# Patient Record
Sex: FEMALE | Race: WHITE | NOT HISPANIC OR LATINO | Employment: OTHER | URBAN - METROPOLITAN AREA
[De-identification: names, ages, dates, MRNs, and addresses within clinical notes are randomized per-mention and may not be internally consistent; named-entity substitution may affect disease eponyms.]

---

## 2017-03-06 ENCOUNTER — ALLSCRIPTS OFFICE VISIT (OUTPATIENT)
Dept: OTHER | Facility: OTHER | Age: 67
End: 2017-03-06

## 2017-03-31 DIAGNOSIS — M67.432 GANGLION OF LEFT WRIST: ICD-10-CM

## 2017-05-18 ENCOUNTER — TRANSCRIBE ORDERS (OUTPATIENT)
Dept: ADMINISTRATIVE | Facility: HOSPITAL | Age: 67
End: 2017-05-18

## 2017-05-18 DIAGNOSIS — Z12.31 VISIT FOR SCREENING MAMMOGRAM: Primary | ICD-10-CM

## 2017-06-01 ENCOUNTER — HOSPITAL ENCOUNTER (OUTPATIENT)
Dept: RADIOLOGY | Facility: HOSPITAL | Age: 67
Discharge: HOME | End: 2017-06-01
Payer: MEDICARE

## 2017-06-01 DIAGNOSIS — Z12.31 VISIT FOR SCREENING MAMMOGRAM: ICD-10-CM

## 2017-06-01 PROCEDURE — G0202 SCR MAMMO BI INCL CAD: HCPCS

## 2018-01-13 VITALS
HEIGHT: 65 IN | OXYGEN SATURATION: 99 % | WEIGHT: 227 LBS | DIASTOLIC BLOOD PRESSURE: 76 MMHG | TEMPERATURE: 97.9 F | BODY MASS INDEX: 37.82 KG/M2 | SYSTOLIC BLOOD PRESSURE: 118 MMHG | HEART RATE: 77 BPM

## 2018-07-30 ENCOUNTER — TRANSCRIBE ORDERS (OUTPATIENT)
Dept: ADMINISTRATIVE | Facility: HOSPITAL | Age: 68
End: 2018-07-30

## 2018-07-30 DIAGNOSIS — M89.9 BONE DISEASE: ICD-10-CM

## 2018-07-30 DIAGNOSIS — Z12.39 SCREENING BREAST EXAMINATION: Primary | ICD-10-CM

## 2018-08-14 ENCOUNTER — HOSPITAL ENCOUNTER (OUTPATIENT)
Dept: RADIOLOGY | Facility: HOSPITAL | Age: 68
Discharge: HOME | End: 2018-08-14
Payer: MEDICARE

## 2018-08-14 DIAGNOSIS — M89.9 BONE DISEASE: ICD-10-CM

## 2018-08-14 DIAGNOSIS — Z12.39 SCREENING BREAST EXAMINATION: ICD-10-CM

## 2018-08-14 PROCEDURE — 77067 SCR MAMMO BI INCL CAD: CPT

## 2018-08-14 PROCEDURE — 77080 DXA BONE DENSITY AXIAL: CPT

## 2018-08-14 PROCEDURE — 77063 BREAST TOMOSYNTHESIS BI: CPT

## 2018-08-20 ENCOUNTER — TRANSCRIBE ORDERS (OUTPATIENT)
Dept: ADMINISTRATIVE | Facility: HOSPITAL | Age: 68
End: 2018-08-20

## 2018-08-20 DIAGNOSIS — M71.21 SYNOVIAL CYST OF RIGHT POPLITEAL SPACE: Primary | ICD-10-CM

## 2018-08-22 ENCOUNTER — HOSPITAL ENCOUNTER (OUTPATIENT)
Dept: RADIOLOGY | Facility: HOSPITAL | Age: 68
Discharge: HOME | End: 2018-08-22
Payer: MEDICARE

## 2018-08-22 DIAGNOSIS — R92.8 ABNORMAL MAMMOGRAM: ICD-10-CM

## 2018-08-22 PROCEDURE — 77065 DX MAMMO INCL CAD UNI: CPT

## 2018-08-22 PROCEDURE — 76642 ULTRASOUND BREAST LIMITED: CPT

## 2018-08-23 ENCOUNTER — HOSPITAL ENCOUNTER (OUTPATIENT)
Dept: RADIOLOGY | Facility: HOSPITAL | Age: 68
Discharge: HOME | End: 2018-08-23
Payer: MEDICARE

## 2018-08-23 DIAGNOSIS — M71.21 SYNOVIAL CYST OF RIGHT POPLITEAL SPACE: ICD-10-CM

## 2018-08-23 PROCEDURE — 76882 US LMTD JT/FCL EVL NVASC XTR: CPT

## 2018-08-30 ENCOUNTER — OFFICE VISIT (OUTPATIENT)
Dept: SURGERY | Facility: CLINIC | Age: 68
End: 2018-08-30
Payer: MEDICARE

## 2018-08-30 VITALS
WEIGHT: 228 LBS | DIASTOLIC BLOOD PRESSURE: 80 MMHG | TEMPERATURE: 99.5 F | HEART RATE: 93 BPM | HEIGHT: 63 IN | SYSTOLIC BLOOD PRESSURE: 132 MMHG | BODY MASS INDEX: 40.4 KG/M2

## 2018-08-30 DIAGNOSIS — M71.21 BAKER'S CYST, RIGHT: Primary | ICD-10-CM

## 2018-08-30 DIAGNOSIS — M71.22 BAKER'S CYST, LEFT: ICD-10-CM

## 2018-08-30 PROCEDURE — 99215 OFFICE O/P EST HI 40 MIN: CPT | Performed by: SPECIALIST

## 2018-08-30 RX ORDER — MULTIVIT-MIN/IRON FUM/FOLIC AC 7.5 MG-4
1 TABLET ORAL DAILY
COMMUNITY

## 2018-08-30 RX ORDER — PERPHENAZINE/AMITRIPTYLINE HCL 4 MG-25 MG
TABLET ORAL
COMMUNITY

## 2018-08-30 NOTE — PROGRESS NOTES
History and Physical    Linda Faust 77 y o  female MRN: [id number]  Unit/Bed#:  Encounter: [id number]    History of Present Illness     HPI:  Linda Faust is a 77 y o  female who presents with a Baker cyst of her right knee and a popliteal cyst on her left  She thought she pulled a muscle  And then it got worse  An ultrasound was performed which revealed a right Baker's cyst   With an inferior component 1 cm in diameter extending superior from this is a component which measures 0 4 x 4 cm  She has a left popliteal cyst which is 5 5 x 0 5 cm with another component which is 1 x 1 7 cm  She has seen Dr Reji Kraus in the past for any problems  At which time she was told she might need a knee replacement  Review of Systems   Cardiovascular:        Possible high cholesterol  Awaiting blood test   All other systems reviewed and are negative  Historical Information   No past medical history on file  Past Surgical History:   Procedure Laterality Date    BREAST LUMPECTOMY  1970   [facility]    COLONOSCOPY  2016     Social History   History   Alcohol Use No     History   Drug Use No     History   Smoking Status    Never Smoker   Smokeless Tobacco    Never Used     Family History:   Family History   Problem Relation Age of Onset    Heart disease Father        Meds/Allergies   all current active meds have been reviewed, current meds: No current facility-administered medications for this visit       and PTA meds:    (Not in a hospital admission)  No Known Allergies    Objective       Current Vitals:   Blood Pressure: 132/80 (08/30/18 1500)  Pulse: 93 (08/30/18 1500)  Temperature: 99 5 °F (37 5 °C) (08/30/18 1500)  Temp Source: Oral (08/30/18 1500)  Height: 5' 3" (160 cm) (08/30/18 1500)  Weight - Scale: 103 kg (228 lb) (08/30/18 1500)    Invasive Devices          No matching active lines, drains, or airways          Physical Exam   Constitutional: She is oriented to person, place, and time  She appears well-developed and well-nourished  No distress  HENT:   Head: Normocephalic and atraumatic  Right Ear: External ear normal    Left Ear: External ear normal    Nose: Nose normal    Mouth/Throat: Oropharynx is clear and moist  No oropharyngeal exudate  Eyes: Conjunctivae and EOM are normal  Pupils are equal, round, and reactive to light  Right eye exhibits no discharge  Left eye exhibits no discharge  No scleral icterus  Neck: Normal range of motion  Neck supple  No JVD present  No tracheal deviation present  No thyromegaly present  Cardiovascular: Normal rate, regular rhythm and intact distal pulses  Exam reveals no gallop and no friction rub  No murmur heard  Pulmonary/Chest: Effort normal and breath sounds normal  No stridor  No respiratory distress  She has no wheezes  She has no rales  She exhibits no tenderness  Abdominal: Soft  Bowel sounds are normal  She exhibits no distension and no mass  There is no tenderness  There is no rebound and no guarding  Musculoskeletal: Normal range of motion  She exhibits no edema, tenderness or deformity  4 cm cyst  posterior right knee    4 cm cyst below left knee   Lymphadenopathy:     She has no cervical adenopathy  Neurological: She is alert and oriented to person, place, and time  She has normal reflexes  No cranial nerve deficit  She exhibits normal muscle tone  Coordination normal    Skin: Skin is warm  No rash noted  She is not diaphoretic  No erythema  No pallor  Psychiatric: She has a normal mood and affect  Her behavior is normal  Judgment and thought content normal        Lab Results: I have personally reviewed pertinent lab results    , CBC: No results found for: WBC, HGB, HCT, MCV, PLT, ADJUSTEDWBC, MCH, MCHC, RDW, MPV, NRBC, CMP: Lab Results   Component Value Date    BUN 21 07/13/2016    CREATININE 0 96 07/13/2016    EGFR 57 8 07/13/2016   , Coagulation: No results found for: PT, INR, APTT, Urinalysis: No results found for: [place], SPECGRAV, PHUR, LEUKOCYTESUR, NITRITE, PROTEINUA, GLUCOSEU, KETONESU, BILIRUBINUR, BLOODU, Amylase: No results found for: AMYLASE, Lipase: No results found for: LIPASE  Imaging: Dxa Bone Density Spine Hip And Pelvis    Result Date: 8/14/2018  Narrative: CENTRAL  DXA SCAN CLINICAL HISTORY:   77year old post-menopausal  female without central risk factors  Osteoporosis screening TECHNIQUE: Bone densitometry was performed using a authorGEN bone densitometer  Regions of interest appear properly placed  L3 and L4 were excluded due to sclerosis  There are no obvious fractures or other confounding variables which could limit the study  COMPARISON:  None  RESULTS: LUMBAR SPINE:  L1-L2: BMD 0 914 gm/cm2 T-score -2 1 Z-score -0 5 LEFT TOTAL HIP: BMD 0 808 gm/cm2 T-score -1 6 Z-score -0 1 LEFT FEMORAL NECK: BMD 0 688 gm/cm2 T-score -2 5 Z-score -0 8 RIGHT TOTAL HIP: BMD 0 849 gm/cm2 T-score -1 3 Z-score 0 2 RIGHT FEMORAL NECK: BMD 0 703 gm/cm2 T-score -2 4 Z-score -0 7     Impression: 1  Based on the [facility] classification, the T-score of -2 5 in the left femoral neck is consistent with osteoporosis  2   According to the [address] [place] [address], prescription therapy is recommended with a T-score of -2 5 or less in the spine or hip after appropriate evaluation to exclude secondary causes  3   A daily intake of at least 1200 mg Calcium and 800 to 1000 IU of Vitamin D, as well as weight bearing and muscle strengthening exercise, fall prevention and avoidance of tobacco and excessive alcohol intake as  basic preventive measures are suggested  4   Repeat DXA  in 18 - 24 months, on the same machine, as clinically indicated   WHO CLASSIFICATION: Normal (a T-score of -1 0 or higher) Low bone mineral density (a T-score of less than -1 0 but higher than -2 5) Osteoporosis (a T-score of -2 5 or less) Severe osteoporosis (a T-score of -2 5 or less with a fragility fracture)   Workstation performed: [id number]     Mammo Diagnostic Right W Cad    Result Date: 8/22/2018  Narrative: Patient History: Patient is postmenopausal  Family history of breast cancer at age 48 or over in maternal aunt  Benign excisional biopsy of the right breast, 1971  Patient's BMI is 34 9  Reason for exam: addl evaluation requested from abnormal screening  Follow-up nodule seen in the right breast on a screening study Mammo Diagnostic Right W CAD: August 22, 2018 - Check In #: [de-identified] Spot compression CC, spot compression MLO, and ML view(s) were taken of the right breast  Technologist: Maxine Rubinstein, R T RM Prior study comparison: August 14, 2018, mammo screening bilateral W DBT and CAD performed at [place] [address]  June 1, 2017, mammo screening bilateral W CAD performed at [place] [address]  December 11, 2015, [facility] screening bilateral digital matilda performed at [place] [address]  February 29, 2012, bilateral screening mammogram performed at [place] [address]  August 30, 2010, bilateral screening mammogram performed at [place] [address]  August 28, 2009, bilateral screening mammogram performed at [place] [address]  There are scattered fibroglandular densities  A 5 mm well-circumscribed nodule persists in the upper outer right breast   There is an ultrasound correlate for this  US Breast Right Limited: August 22, 2018 - Check In #: [de-identified] Standard views  Technologist: Cindy Durant RDMS A uniformly echogenic layer of fibroglandular tissue  Real-time images are obtained in the right breast   At the 11 o'clock position, 10 cm from the nipple a well-circumscribed oval-shaped hypoechoic nodule measures 0 9 x 0 3 x 1 0 cm  This could represent a small lymph node or perhaps a complicated cyst   This does not have a mammographic correlate   At the 11:30 position, 10 cm from the nipple is a simple cyst measuring 0 6 x 0 3 x 0 5 cm  This correlates with the mammographic finding  IMPRESSION Simple cyst at the 11:30 position which correlates with the mammographic finding  Complex cyst or lymph node at the 11 o'clock position  ACR BI-RADS® Assessments: BiRad:3 - Probably Benign (Overall) Right breast Right Diag Mamm: BiRad:2 - benign finding in the right breast  Right breast Right Brst US: BiRad:3 - probably benign finding in the right breast  Recommendation: Ultrasound of the right breast in 6 months  Analyzed by CAD The patient is scheduled in a reminder system for screening mammography  Transcription Location: [address] [place] [address] [place]: [id number] Risk Value(s): Tyrer-Cuzick 10 Year: 5 800%, Tyrer-Cuzick Lifetime: 9 200%, Myriad Table: 1 5%, GHAZALA 5 Year: 2 4%, NCI Lifetime: 7 1%    Us Breast Right Limited (diagnostic)    Result Date: 8/22/2018  Narrative: Patient History: Patient is postmenopausal  Family history of breast cancer at age 48 or over in maternal aunt  Benign excisional biopsy of the right breast, 1971  Patient's BMI is 34 9  Reason for exam: addl evaluation requested from abnormal screening  Follow-up nodule seen in the right breast on a screening study Mammo Diagnostic Right W CAD: August 22, 2018 - Check In #: [de-identified] Spot compression CC, spot compression MLO, and ML view(s) were taken of the right breast  Technologist: ANALY Soni Prior study comparison: August 14, 2018, mammo screening bilateral W DBT and CAD performed at [place] [address]  June 1, 2017, mammo screening bilateral W CAD performed at [place] [address]  December 11, 2015, [facility] screening bilateral digital matilda performed at [place] [address]  February 29, 2012, bilateral screening mammogram performed at [place] [address]  August 30, 2010, bilateral screening mammogram performed at [place] [address]    August 28, 2009, bilateral screening mammogram performed at [place] [address]  There are scattered fibroglandular densities  A 5 mm well-circumscribed nodule persists in the upper outer right breast   There is an ultrasound correlate for this  US Breast Right Limited: August 22, 2018 - Check In #: [de-identified] Standard views  Technologist: Kaity Hutchison RDMS A uniformly echogenic layer of fibroglandular tissue  Real-time images are obtained in the right breast   At the 11 o'clock position, 10 cm from the nipple a well-circumscribed oval-shaped hypoechoic nodule measures 0 9 x 0 3 x 1 0 cm  This could represent a small lymph node or perhaps a complicated cyst   This does not have a mammographic correlate  At the 11:30 position, 10 cm from the nipple is a simple cyst measuring 0 6 x 0 3 x 0 5 cm  This correlates with the mammographic finding  IMPRESSION Simple cyst at the 11:30 position which correlates with the mammographic finding  Complex cyst or lymph node at the 11 o'clock position  ACR BI-RADS® Assessments: BiRad:3 - Probably Benign (Overall) Right breast Right Diag Mamm: BiRad:2 - benign finding in the right breast  Right breast Right Brst US: BiRad:3 - probably benign finding in the right breast  Recommendation: Ultrasound of the right breast in 6 months  Analyzed by CAD The patient is scheduled in a reminder system for screening mammography  Transcription Location: [address] [place] [address]: [id number] Risk Value(s): Edwiner-Cuzick 10 Year: 5 800%, Tyrer-Cuzick Lifetime: 9 200%, Myriad Table: 1 5%, GHAZALA 5 Year: 2 4%, NCI Lifetime: 7 1%    Us Extremity Soft Tissue    Result Date: 8/23/2018  Narrative: LOWER EXTREMITY ULTRASOUND INDICATION:  Leg swelling  COMPARISON:  None  FINDINGS: Ultrasound evaluation of the right popliteal fossa was performed to evaluate for possible Baker's cysts  There is evidence of a right Baker's cyst   An inferior component measures approximately 1 cm in diameter    Extending superior from this is a component which measures approximately 0 4 x 4 0 cm  A left popliteal cyst is present  There is a superior component that measures approximately 1 0 x 1 7 cm  Extending inferior from this is a component that measures approximately 5 5 x 0 5 cm  Impression: Bilateral Baker's cysts  Workstation performed: [id number]     Mammo Screening Bilateral W 3d & Cad    Result Date: 8/15/2018  Narrative: Patient History: Patient is postmenopausal  Family history of breast cancer at age 48 or over in maternal aunt  Benign excisional biopsy of the right breast, 1971  Patient's BMI is 34 9  Reason for exam: screening, asymptomatic  Screening Mammo Screening Bilateral W DBT and CAD: August 14, 2018 - Check In #: [de-identified] 2D/3D Procedure 3D Bilateral CC and MLO view(s) were taken  2D Bilateral CC and MLO view(s) were taken  Technologist: PIYUSH Quach Prior study comparison: June 1, 2017, mammo screening bilateral W CAD performed at [place] [address]  December 11, 2015, [facility] screening bilateral digital matilda performed at [place] [address]  February 29, 2012, bilateral screening mammogram performed at [place] [address]  August 30, 2010, bilateral screening mammogram performed at [place] [address]  August 28, 2009, bilateral screening mammogram performed at [place] [address]  There are scattered fibroglandular densities  Right breast upper-outer quadrant nodule should have further evaluation diagnostic mammography and ultrasound  No dominant soft tissue mass, architectural distortion or suspicious calcifications are noted in either breast   The skin and nipple contours are within normal limits   IMPRESSION ACR BI-RADS® Assessments: BiRad:0 - Incomplete: needs additional imaging evaluation Recommendation: Further imaging of the right breast  A breast health care nurse from our facility will be contacting the patient regarding the need for additional imaging  The patient is scheduled in a reminder system for screening mammography  8-10% of cancers will be missed on mammography  Management of a palpable abnormality must be based on clinical grounds  Patients will be notified of their results via letter from our facility  Accredited by [address] [place] [address] of Radiology and FDA  Transcription Location: [address] [place] [address] [place]: [id number] Risk Value(s): Tyrer-Cuzick 10 Year: 5 800%, Tyrer-Cuzick Lifetime: 9 200%, Myriad Table: 1 5%, GHAZALA 5 Year: 2 4%, NCI Lifetime: 7 1%    EKG, Pathology, and Other Studies: I have personally reviewed pertinent reports  Assessment/Plan     Assessment:  Héctor Armenta comes today for Baker cysts of her knee  She has been seen by Dr schilling and a past for possible knee replacement  In view of this I recommend her going back to her orthopedist to evaluate the cyst behind her right and left knee  Plan:  Referral DR Leandro Goyal who she has seen in the past        Counseling / Coordination of Care  Total face to face office time spent today 40minutes  Greater than 50% of total time was spent with the patient and / or family counseling and / or coordination of care  A description of the counseling / coordination of care- see assessement

## 2019-04-01 ENCOUNTER — TRANSCRIBE ORDERS (OUTPATIENT)
Dept: ADMINISTRATIVE | Facility: HOSPITAL | Age: 69
End: 2019-04-01

## 2019-04-01 DIAGNOSIS — N60.01 SOLITARY CYST OF RIGHT BREAST: Primary | ICD-10-CM

## 2019-04-10 ENCOUNTER — HOSPITAL ENCOUNTER (OUTPATIENT)
Dept: RADIOLOGY | Facility: HOSPITAL | Age: 69
Discharge: HOME | End: 2019-04-10
Payer: MEDICARE

## 2019-04-10 VITALS — HEIGHT: 64 IN | BODY MASS INDEX: 37.05 KG/M2 | WEIGHT: 217 LBS

## 2019-04-10 DIAGNOSIS — N60.09 SOLITARY CYST OF BREAST: ICD-10-CM

## 2019-04-10 PROCEDURE — 76642 ULTRASOUND BREAST LIMITED: CPT

## 2019-11-06 ENCOUNTER — TRANSCRIBE ORDERS (OUTPATIENT)
Dept: ADMINISTRATIVE | Facility: HOSPITAL | Age: 69
End: 2019-11-06

## 2019-11-06 DIAGNOSIS — R92.8 ABNORMAL MAMMOGRAM: Primary | ICD-10-CM

## 2019-12-04 ENCOUNTER — HOSPITAL ENCOUNTER (OUTPATIENT)
Dept: RADIOLOGY | Facility: HOSPITAL | Age: 69
Discharge: HOME | End: 2019-12-04
Payer: MEDICARE

## 2019-12-04 VITALS — WEIGHT: 215 LBS | HEIGHT: 65 IN | BODY MASS INDEX: 35.82 KG/M2

## 2019-12-04 DIAGNOSIS — R92.8 ABNORMAL MAMMOGRAM: ICD-10-CM

## 2019-12-04 PROCEDURE — 76642 ULTRASOUND BREAST LIMITED: CPT

## 2019-12-04 PROCEDURE — G0279 TOMOSYNTHESIS, MAMMO: HCPCS

## 2019-12-04 PROCEDURE — 77066 DX MAMMO INCL CAD BI: CPT

## 2020-06-24 ENCOUNTER — TRANSCRIBE ORDERS (OUTPATIENT)
Dept: ADMINISTRATIVE | Facility: HOSPITAL | Age: 70
End: 2020-06-24

## 2020-06-24 DIAGNOSIS — R92.8 ABNORMAL MAMMOGRAM: Primary | ICD-10-CM

## 2020-07-22 ENCOUNTER — HOSPITAL ENCOUNTER (OUTPATIENT)
Dept: RADIOLOGY | Facility: HOSPITAL | Age: 70
Discharge: HOME | End: 2020-07-22
Payer: MEDICARE

## 2020-07-22 DIAGNOSIS — R92.8 ABNORMAL MAMMOGRAM: ICD-10-CM

## 2020-07-22 PROCEDURE — 76642 ULTRASOUND BREAST LIMITED: CPT

## 2021-08-31 ENCOUNTER — HOSPITAL ENCOUNTER (OUTPATIENT)
Dept: RADIOLOGY | Facility: HOSPITAL | Age: 71
Discharge: HOME | End: 2021-08-31
Payer: MEDICARE

## 2021-08-31 VITALS — BODY MASS INDEX: 36.37 KG/M2 | HEIGHT: 64 IN | WEIGHT: 213 LBS

## 2021-08-31 DIAGNOSIS — Z12.31 ENCOUNTER FOR SCREENING MAMMOGRAM FOR MALIGNANT NEOPLASM OF BREAST: ICD-10-CM

## 2021-08-31 PROCEDURE — 77067 SCR MAMMO BI INCL CAD: CPT

## 2021-08-31 PROCEDURE — 77063 BREAST TOMOSYNTHESIS BI: CPT

## 2021-09-14 ENCOUNTER — HOSPITAL ENCOUNTER (OUTPATIENT)
Dept: RADIOLOGY | Facility: HOSPITAL | Age: 71
Discharge: HOME | End: 2021-09-14
Payer: MEDICARE

## 2021-09-14 DIAGNOSIS — M89.9 DISORDER OF BONE, UNSPECIFIED: ICD-10-CM

## 2021-09-14 PROCEDURE — 77080 DXA BONE DENSITY AXIAL: CPT

## 2022-01-04 ENCOUNTER — HOSPITAL ENCOUNTER (EMERGENCY)
Facility: HOSPITAL | Age: 72
Discharge: HOME | End: 2022-01-04
Attending: EMERGENCY MEDICINE | Admitting: EMERGENCY MEDICINE
Payer: MEDICARE

## 2022-01-04 VITALS
RESPIRATION RATE: 20 BRPM | WEIGHT: 213.6 LBS | TEMPERATURE: 98 F | BODY MASS INDEX: 37.24 KG/M2 | DIASTOLIC BLOOD PRESSURE: 79 MMHG | SYSTOLIC BLOOD PRESSURE: 151 MMHG | HEART RATE: 92 BPM | OXYGEN SATURATION: 97 %

## 2022-01-04 DIAGNOSIS — R55 SYNCOPE: Primary | ICD-10-CM

## 2022-01-04 DIAGNOSIS — U07.1 COVID: ICD-10-CM

## 2022-01-04 PROCEDURE — 99284 EMERGENCY DEPT VISIT MOD MDM: CPT | Performed by: EMERGENCY MEDICINE

## 2022-01-04 PROCEDURE — 87636 SARSCOV2 & INF A&B AMP PRB: CPT | Performed by: EMERGENCY MEDICINE

## 2022-01-04 PROCEDURE — 99284 EMERGENCY DEPT VISIT MOD MDM: CPT

## 2022-01-04 NOTE — DISCHARGE INSTRUCTIONS
Your vital signs are normal, you have no serious or concerning symptoms  You should rest at home and orally hydrate  Return to the ER if any head pain, chest pain, breathing distress, dizziness or other concerning symptoms  Otherwise isolate for at least 7 days and use tylenol for fever or aches  You need to be improved and no fever or chills for at least 24 hours before you come out of isolation  We will confirm the covid with a PCR test and if positive you will be referred for possible monoclonal antibody treatment

## 2022-01-09 LAB
FLUAV RNA RESP QL NAA+PROBE: NEGATIVE
FLUBV RNA RESP QL NAA+PROBE: NEGATIVE
SARS-COV-2 RNA RESP QL NAA+PROBE: POSITIVE

## 2023-10-09 ENCOUNTER — OFFICE VISIT (OUTPATIENT)
Dept: OBGYN CLINIC | Facility: CLINIC | Age: 73
End: 2023-10-09
Payer: MEDICARE

## 2023-10-09 ENCOUNTER — APPOINTMENT (OUTPATIENT)
Dept: RADIOLOGY | Facility: CLINIC | Age: 73
End: 2023-10-09
Payer: MEDICARE

## 2023-10-09 VITALS
SYSTOLIC BLOOD PRESSURE: 138 MMHG | HEART RATE: 73 BPM | BODY MASS INDEX: 38.8 KG/M2 | WEIGHT: 219 LBS | DIASTOLIC BLOOD PRESSURE: 85 MMHG | HEIGHT: 63 IN

## 2023-10-09 DIAGNOSIS — M67.813 BICEPS TENDINOSIS OF RIGHT SHOULDER: Primary | ICD-10-CM

## 2023-10-09 DIAGNOSIS — M75.81 ROTATOR CUFF TENDINITIS, RIGHT: ICD-10-CM

## 2023-10-09 DIAGNOSIS — M25.511 RIGHT SHOULDER PAIN, UNSPECIFIED CHRONICITY: ICD-10-CM

## 2023-10-09 PROCEDURE — 73030 X-RAY EXAM OF SHOULDER: CPT

## 2023-10-09 PROCEDURE — 99203 OFFICE O/P NEW LOW 30 MIN: CPT | Performed by: ORTHOPAEDIC SURGERY

## 2023-10-09 RX ORDER — PREDNISOLONE ACETATE 10 MG/ML
SUSPENSION/ DROPS OPHTHALMIC
COMMUNITY
Start: 2023-09-15

## 2023-10-10 NOTE — PROGRESS NOTES
Orthopedic Sports Medicine New Patient Visit     Assesment:   74 y.o. female with right rotator cuff tendinitis and biceps tendinitis    Plan:    The patient has atraumatic shoulder pain as well as a painless popping sensation localized the bicipital groove. Her strength rotator cuff testing is excellent. I recommended a course of conservative treatment options prior to considering any advanced imaging. I placed a referral for physical therapy to focus on rotator cuff and bicep strengthening and stretching as well as shoulder stabilization and other interventions as indicated. I will see her back in 6 to 8 weeks for repeat evaluation after physical therapy. She can do NSAIDs Tylenol ice or heat as needed for pain. May consider injections in the future as well if she would like. Follow up:    Return in about 8 weeks (around 2023). Chief Complaint   Patient presents with   • Right Shoulder - Pain       History of Present Illness: The patient is a 74 y.o. female presenting with atraumatic pain in the right shoulder. This been present for the last several months. She localizes the majority of her pain to the bicipital groove. She also gets a painless popping sensation there. This is disrupting her sleep from time to time. She does not have any significant neck pain or radiation down to the hand. Pain is worse with overhead and reaching activities. It is better with rest.  She denies any numbness or tingling.     Shoulder Surgical History:  None    Past Medical, Social and Family History:  Past Medical History:   Diagnosis Date   • Breast cyst      Past Surgical History:   Procedure Laterality Date   • BREAST CYST EXCISION Right 1971    2 rt breast cysts removed   •  SECTION      and    • CHOLECYSTECTOMY     • COLONOSCOPY       No Known Allergies  Current Outpatient Medications on File Prior to Visit   Medication Sig Dispense Refill   • Lutein 40 MG CAPS Take by mouth     • Multiple Vitamins-Minerals (MULTIVITAMIN WITH MINERALS) tablet Take 1 tablet by mouth daily     • prednisoLONE acetate (PRED FORTE) 1 % ophthalmic suspension INSTILL 1 DROP INTO LEFT EYES 4 TIMES A DAY       No current facility-administered medications on file prior to visit. Social History     Socioeconomic History   • Marital status:      Spouse name: Not on file   • Number of children: Not on file   • Years of education: Not on file   • Highest education level: Not on file   Occupational History   • Not on file   Tobacco Use   • Smoking status: Never   • Smokeless tobacco: Never   Vaping Use   • Vaping Use: Never used   Substance and Sexual Activity   • Alcohol use: No   • Drug use: No   • Sexual activity: Not on file   Other Topics Concern   • Not on file   Social History Narrative   • Not on file     Social Determinants of Health     Financial Resource Strain: Not on file   Food Insecurity: Not on file   Transportation Needs: Not on file   Physical Activity: Not on file   Stress: Not on file   Social Connections: Not on file   Intimate Partner Violence: Not on file   Housing Stability: Not on file         I have reviewed the past medical, surgical, social and family history, medications and allergies as documented in the EMR. Review of systems: ROS is negative other than that noted in the HPI. Constitutional: Negative for fatigue and fever. HENT: Negative for sore throat. Respiratory: Negative for shortness of breath. Cardiovascular: Negative for chest pain. Gastrointestinal: Negative for abdominal pain. Endocrine: Negative for cold intolerance and heat intolerance. Genitourinary: Negative for flank pain. Musculoskeletal: Negative for back pain. Skin: Negative for rash. Allergic/Immunologic: Negative for immunocompromised state. Neurological: Negative for dizziness. Psychiatric/Behavioral: Negative for agitation.       Physical Exam:    Blood pressure 138/85, pulse 73, height 5' 3" (1.6 m), weight 99.3 kg (219 lb), not currently breastfeeding. General/Constitutional: NAD, well developed, well nourished  HENT: Normocephalic, atraumatic  CV: Intact distal pulses, regular rate  Resp: No respiratory distress or labored breathing  Abdomen: soft, nondistended, non tender   Lymphatic: No lymphadenopathy palpated  Neuro: Alert and Oriented x 3, no focal deficits  Psych: Normal mood, normal affect  Skin: Warm, dry, no rashes, no erythema      Shoulder Exam:      Inspection: No ecchymosis, edema, or deformity. No visualized wounds or skin lesions   Palpation: Tender to palpation bicipital groove. No AC joint tenderness  Active Motion:       FF: 160        ER: 70        IR: L1  Strength: 5/5 empty can, 5/5 ER,  5/5 IR   Sensory - SILT in the Radial / Ulnar / Median / Axillary nerve distributions  Motor - AIN / PIN / Radial / Ulnar / Median / Axillary motor nerves in tact  Palpable Radial pulse  Cap refill <2secs in all digits    + Márquez  + Autumn's  - Belly Press    + Holguin  Imaging    I reviewed and interpreted X-rays of the shoulder which show no acute fractures. Minimal degenerative changes of the before meals and glenohumeral joint. The humeral head is well centered on the glenoid.

## 2023-10-16 ENCOUNTER — EVALUATION (OUTPATIENT)
Dept: PHYSICAL THERAPY | Facility: CLINIC | Age: 73
End: 2023-10-16
Payer: MEDICARE

## 2023-10-16 DIAGNOSIS — M75.81 ROTATOR CUFF TENDINITIS, RIGHT: ICD-10-CM

## 2023-10-16 DIAGNOSIS — M67.813 BICEPS TENDINOSIS OF RIGHT SHOULDER: ICD-10-CM

## 2023-10-16 PROCEDURE — 97110 THERAPEUTIC EXERCISES: CPT

## 2023-10-16 PROCEDURE — 97161 PT EVAL LOW COMPLEX 20 MIN: CPT

## 2023-10-16 NOTE — PROGRESS NOTES
PT Evaluation     Today's date: 10/16/2023  Patient name: Steph Fleming  : 1950  MRN: [id number]  Referring provider: Marly Haile  Dx:   Encounter Diagnosis     ICD-10-CM    1. Biceps tendinosis of right shoulder  M67.813 Ambulatory Referral to Physical Therapy      2. Rotator cuff tendinitis, right  M75.81 Ambulatory Referral to Physical Therapy                     Assessment  Assessment details: Steph Fleming is a 74 y.o. female who presents with subacute right shoulder pain consistent with the referring diagnoses of right biceps tendinitis and right rotator cuff tendinitis. Patient presents with the following impairments: right shoulder pain, limited right shoulder ER ROM, limited right shoulder. Due to these impairments, patient has difficulty performing the following: reaching behind the back, pushing, pulling, lifting, lifting overhead, and walking the dog. Patient has been educated in home exercise program and plan of care. Patient would benefit from skilled physical therapy services to address the above functional limitations and progress towards prior level of function and independence with home exercise program.  Impairments: abnormal muscle firing, abnormal or restricted ROM, activity intolerance, impaired physical strength, lacks appropriate home exercise program, pain with function, scapular dyskinesis, poor posture  and poor body mechanics  Understanding of Dx/Px/POC: good   Prognosis: good    Goals  Short Term Goals [3 weeks; target date: 23]  1. Patient will be independent with initial HEP. 2. Patient will demonstrate an increase in right shoulder ER PROM to 85°. 3. Patient will demonstrate an increase in right shoulder strength of 1/2 grade on MMT. 4. Patient will be able to complete all below shoulder-height ADLs without increase in symptoms. Long Term Goals [6 weeks; target date: 23]  1. Patient will be independent with comprehensive HEP.     2. Patient will demonstrate an increase in right shoulder AROM to WNL in order to promote bathing/dressing without pain. 3. Patient will demonstrate an increase in right shoulder strength to at least 4/5 on MMT in order to promote pain-free carrying/lifting. 4. Patient will be able to place a 5 lb. weight on a shelf above shoulder height with proper scapulohumeral mechanics. 5. Patient will be able to push a 50 lb. object across the floor with R shoulder pain of 2-3/10 at worst.     Plan  Plan details: Patient has verbalized understanding and agreement with insurance verification form. Patient would benefit from: skilled physical therapy  Planned modality interventions: cryotherapy, electrical stimulation/Russian stimulation, TENS, ultrasound, high voltage pulsed current: pain management, thermotherapy: hydrocollator packs, unattended electrical stimulation and high voltage pulsed current: spasm management  Planned therapy interventions: flexibility, functional ROM exercises, graded exercise, home exercise program, joint mobilization, manual therapy, neuromuscular re-education, patient education, strengthening, stretching, therapeutic exercise, therapeutic activities, activity modification, postural training, body mechanics training, graded activity, self care, muscle pump exercises, abdominal trunk stabilization, ADL retraining, ADL training, IADL retraining, breathing training, behavior modification, therapeutic training, transfer training and Hernandez taping  Frequency: 2x week  Duration in weeks: 6  Plan of Care beginning date: 10/16/2023  Plan of Care expiration date: 12/31/2023  Treatment plan discussed with: patient    Subjective Evaluation    History of Present Illness  Mechanism of injury: Pt reports R shoulder pain over the past 3 weeks, which she attributes to moving and pushing boxes around her home as she is trying to get rid of things around her home.  Pt states that while pushing with her arm to get up from her sectional couch, she has pain in the shoulder. Pt states that reaching back will sometimes cause popping and mild pain. Pt denies difficulty with getting dressed, bathing, or getting ready. Pt states she had an x-ray taken when she saw orthopedics, which was normal. Pt was referred to PT. Patient Goals  Patient goal: Complete healing of right shoulder  Pain  Current pain ratin  At best pain ratin  At worst pain ratin  Location: Right shoulder  Quality: sharp  Relieving factors: heat  Exacerbated by: Reaching back, pushing, pulling, lifting. Progression: no change    Social Support  Lives with: adult children    Employment status: not working  Hand dominance: left  Exercise history: Walking 1 mile a day, sometimes 2x/day      Diagnostic Tests  X-ray: normal  Treatments  No previous or current treatments      Objective     Active Range of Motion   Left Shoulder   Flexion: 160 degrees   Abduction: 170 degrees   External rotation 0°: 85 degrees   Internal rotation 0°: WFL  Internal rotation BTB: T8     Right Shoulder   Flexion: 160 degrees   Abduction: 170 degrees   External rotation 0°: 70 degrees with pain  Internal rotation 0°: WFL  Internal rotation BTB: T8     Strength/Myotome Testing     Left Shoulder     Planes of Motion   Flexion: 5   Abduction: 5   External rotation at 0°: 4+   Internal rotation at 0°: 5     Right Shoulder     Planes of Motion   Flexion: 4+   Abduction: 4   External rotation at 0°: 3+   Internal rotation at 0°: 4     Additional Strength Details  Reports slight inc. R shoulder pain with abduction and IR MMT.            Insurance:  AMA/CMS Eval/ Re-eval POC expires FOTO Auth Status Co-Insurance   CMS - [facility] 10/16/23 12/31/23  None req No                                              1.  10/16 2.  3.  4.  5.  6.    7.  8.  9.  10.  11.  12.    13.  14.  15.  16.  17.  18.    19.  20. 21. 22. 23. 24.    25.  26. 27. 28. 29. 30.   31. 32.  33. 34. 35. 36.        Precautions: Standard       EVAL 2 3 4 5 6   Manuals 10/16/23        STM/MFR          Joint mobs                  Neuro Re-Ed         Rows         Shoulder extensions         B/L ER                                    Ther Ex         Sidelying ER 5x         Cane extension 5x standing        Theraband IR/ER                                                               Ther Activity         Pt education POC, HEP                                                              Modalities                              Access Code: [email or web address]  URL: [email or web address]  Date: 10/16/2023  Prepared by: Arroyo Cage    Exercises  - Sidelying Shoulder External Rotation  - 1 x daily - 7 x weekly - 2 sets - 10 reps - 3 hold  - Standing Shoulder Extension with Dowel  - 1 x daily - 7 x weekly - 2 sets - 10 reps - 5 hold

## 2023-10-20 ENCOUNTER — OFFICE VISIT (OUTPATIENT)
Dept: PHYSICAL THERAPY | Facility: CLINIC | Age: 73
End: 2023-10-20
Payer: MEDICARE

## 2023-10-20 DIAGNOSIS — M67.813 BICEPS TENDINOSIS OF RIGHT SHOULDER: Primary | ICD-10-CM

## 2023-10-20 DIAGNOSIS — M75.81 ROTATOR CUFF TENDINITIS, RIGHT: ICD-10-CM

## 2023-10-20 PROCEDURE — 97112 NEUROMUSCULAR REEDUCATION: CPT

## 2023-10-20 PROCEDURE — 97110 THERAPEUTIC EXERCISES: CPT

## 2023-10-20 NOTE — PROGRESS NOTES
Daily Note     Today's date: 10/20/2023  Patient name: Laurence Bronson  : 1950  MRN: [id number]  Referring provider: Andrey Gottron*  Dx:   Encounter Diagnosis     ICD-10-CM    1. Biceps tendinosis of right shoulder  M67.813       2. Rotator cuff tendinitis, right  M75.81                      Subjective: Pt reports no new complaints. Still getting clicking in shoulder, can be painful and frustrating. Objective: MWM for R shoulder scaption nearly eliminate sx - well maintained post. Requires significant cueing for posterior chain activation throughout exercise program.       Assessment: Tolerated treatment well. Patient demonstrated fatigue post treatment and would benefit from continued PT. Does well w/ exercise progressions. Fatigue but no increase in pain by end of session. Good tolerance to initial exercise intensity increases, will require significant posterior chain strength/stability challenges as we progress. Pt given green aureliaand and updated HEP below. Plan: Continue per plan of care.  Loaded posterior chain progressions as able     Insurance:  AMA/CMS Eval/ Re-eval POC expires FOTO Auth Status Co-Insurance   CMS - [facility] 10/16/23   12/31/23  None req No   MCR                                           1.  10/16 2.   10/20 3.  4.  5.  6.    7.  8.  9.  10.  11.  12.    13.  14.  15.  16.  17.  18.    19.  20. 21. 22. 23. 24.    25.  26. 27. 28. 29. 30.   31. 32.  33. 34. 35. 36.        Precautions: Standard       EVAL 2 3 4 5 6   Manuals 10/16/23 10/20/23       STM/MFR          Joint mobs                  Neuro Re-Ed         Rows  Green x30       Shoulder extensions  Red x 30       B/L ER  No resistance x20       Prone supermans  X10 w/ 5 sec hold       Prone T's   X10 w/ 5 sec holds       B ER w/ scap sq, horiz abd w/ scap sq  2x10 green each       Ther Ex         Sidelying ER 5x         Cane extension 5x standing rev       Theraband IR/ER         L shoulder PROM  All directions x 8 mins        MWM for L shoulder flexion 2x8         Wall slides  2x10       Dowel flexion  2x10                         Ther Activity         Pt education POC, HEP                                                              Modalities                              Access Code: [email or web address]  URL: [email or web address]  Date: 10/16/2023  Prepared by: Aurelia Becerril    Exercises  - Sidelying Shoulder External Rotation  - 1 x daily - 7 x weekly - 2 sets - 10 reps - 3 hold  - Standing Shoulder Extension with Dowel  - 1 x daily - 7 x weekly - 2 sets - 10 reps - 5 hold    From 10/20:  Access Code: [email or web address]  URL: [email or web address]  Date: 10/20/2023  Prepared by: Lety [address] with Anchored Resistance  - 1 x daily - 7 x weekly - 3 sets - 10 reps  - Shoulder extension with resistance - Neutral  - 1 x daily - 7 x weekly - 3 sets - 10 reps  - Standing Shoulder External Rotation with Resistance  - 1 x daily - 7 x weekly - 3 sets - 10 reps  - Standing Shoulder Horizontal Abduction with Resistance  - 1 x daily - 7 x weekly - 3 sets - 10 reps  - Shoulder Flexion Wall Slide with Towel  - 1 x daily - 7 x weekly - 3 sets - 10 reps

## 2023-10-23 ENCOUNTER — OFFICE VISIT (OUTPATIENT)
Dept: PHYSICAL THERAPY | Facility: CLINIC | Age: 73
End: 2023-10-23
Payer: MEDICARE

## 2023-10-23 DIAGNOSIS — M75.81 ROTATOR CUFF TENDINITIS, RIGHT: ICD-10-CM

## 2023-10-23 DIAGNOSIS — M67.813 BICEPS TENDINOSIS OF RIGHT SHOULDER: Primary | ICD-10-CM

## 2023-10-23 PROCEDURE — 97112 NEUROMUSCULAR REEDUCATION: CPT

## 2023-10-23 PROCEDURE — 97110 THERAPEUTIC EXERCISES: CPT

## 2023-10-23 NOTE — PROGRESS NOTES
Daily Note      Today's date: 10/23/2023  Patient name: Holly Meyer  : 1950  MRN: [id number]  Referring provider: Dav Wagner*  Dx:   Encounter Diagnosis     ICD-10-CM    1. Biceps tendinosis of right shoulder  M67.813       2. Rotator cuff tendinitis, right  M75.81           Subjective: Pt reports that her shoulder is clicking more often compared to last week. Pt states her shoulder hurts when it clicks. Objective: See treatment diary below    Assessment: Pt tolerated treatment well. Pt presents with minimal symptoms with PT stabilization of GHJ into inferior and posterior glide. Pt fatigues quickly with periscapular strengthening progressions. Plan: Continue per plan of care. Progress treatment as tolerated.           Insurance:  A/CMS Eval/ Re-eval POC expires FOTO Auth Status Co-Insurance   CMS - [facility] 10/16/23   12/31/23  None req No   MCR                                           1.  10/16 2.   10/20 3.   10/23 4.  5.  6.    7.  8.  9.  10.  11.  12.    13.  14.  15.  16.  17.  18.    19.  20. 21. 22. 23. 24.    25.  26. 27. 28. 29. 30.   31. 32.  33. 34. 35. 36.        Precautions: Standard       EVAL 2 3 4 5 6   Manuals 10/16/23 10/20/23 10/23/23      STM/MFR          Joint mobs                  Neuro Re-Ed         Rows  Green x30 30x GTB      Shoulder extensions  Red x 30 30x RTB      B/L ER  No resistance x20 No resistance 20x      Prone supermans  X10 w/ 5 sec hold 15x5s       Prone T's   X10 w/ 5 sec holds 15x5s       B ER w/ scap sq, horiz abd w/ scap sq  2x10 green each 20x RTB      Ther Ex         Sidelying ER 5x         Cane extension 5x standing rev       Theraband IR/ER         L shoulder PROM  All directions x 8 mins  All directions x 8 min      MWM for L shoulder flexion 2x8   2x10      Wall slides  2x10 2x10      Dowel flexion  2x10 2x10                        Ther Activity         Pt education POC, HEP  Reviewed POC and HEP Modalities                              Access Code: [email or web address]  URL: [email or web address]  Date: 10/16/2023  Prepared by: Kendy Mckinney    Exercises  - Sidelying Shoulder External Rotation  - 1 x daily - 7 x weekly - 2 sets - 10 reps - 3 hold  - Standing Shoulder Extension with Dowel  - 1 x daily - 7 x weekly - 2 sets - 10 reps - 5 hold    From 10/20:  Access Code: [email or web address]  URL: [email or web address]  Date: 10/20/2023  Prepared by: Lety [facility] [address] with Anchored Resistance  - 1 x daily - 7 x weekly - 3 sets - 10 reps  - Shoulder extension with resistance - Neutral  - 1 x daily - 7 x weekly - 3 sets - 10 reps  - Standing Shoulder External Rotation with Resistance  - 1 x daily - 7 x weekly - 3 sets - 10 reps  - Standing Shoulder Horizontal Abduction with Resistance  - 1 x daily - 7 x weekly - 3 sets - 10 reps  - Shoulder Flexion Wall Slide with Towel  - 1 x daily - 7 x weekly - 3 sets - 10 reps

## 2023-10-24 ENCOUNTER — OFFICE VISIT (OUTPATIENT)
Dept: PHYSICAL THERAPY | Facility: CLINIC | Age: 73
End: 2023-10-24
Payer: MEDICARE

## 2023-10-24 DIAGNOSIS — M67.813 BICEPS TENDINOSIS OF RIGHT SHOULDER: Primary | ICD-10-CM

## 2023-10-24 DIAGNOSIS — M75.81 ROTATOR CUFF TENDINITIS, RIGHT: ICD-10-CM

## 2023-10-24 PROCEDURE — 97110 THERAPEUTIC EXERCISES: CPT | Performed by: PHYSICAL THERAPIST

## 2023-10-24 PROCEDURE — 97112 NEUROMUSCULAR REEDUCATION: CPT | Performed by: PHYSICAL THERAPIST

## 2023-10-24 NOTE — PROGRESS NOTES
Daily Note      Today's date: 10/24/2023  Patient name: Noah Billy  : 1950  MRN: [id number]  Referring provider: Nidia Ahumada*  Dx:   Encounter Diagnosis     ICD-10-CM    1. Biceps tendinosis of right shoulder  M67.813       2. Rotator cuff tendinitis, right  M75.81           Subjective: Pt reports clicking in shoulder that is painful in nature. No sleep disturbances reported due to shoulder. No pain with reaching overhead but will have discomfort when moving into shoulder extension. Objective: See treatment diary below    Assessment: Pt tolerated treatment well. Cueing needed for scapular positioning with most exercises today. Patient reported feeling okay with most exercises without reporting of shoulder irritation. Plan: Continue posterior cuff strengthening. Insurance:  A/CMS Eval/ Re-eval POC expires FOTO Auth Status Co-Insurance   CMS - [facility] 10/16/23   12/31/23  None req No   MCR                                    1.  10/16 2.   10/20 3.   10/23 4.   10/24 5.  6.    7.  8.  9.  10.  11.  12.    13.  14.  15.  16.  17.  18.    19.  20. 21. 22. 23. 24.    25.  26. 27. 28. 29. 30.   31. 32.  33. 34. 35. 36.      Precautions: Standard     EVAL 2 3 4 5 6   Manuals 10/16/23 10/20/23 10/23/23 10/24     STM/MFR          Joint mobs                  Neuro Re-Ed         Rows  Green x30 30x GTB 3*10 GTB     Shoulder extensions  Red x 30 30x RTB 3*10 GTB     B/L ER  No resistance x20 No resistance 20x Pulleys with scap retract 20x     Prone supermans  X10 w/ 5 sec hold 15x5s  15x5s      Prone T's   X10 w/ 5 sec holds 15x5s  15x5s      B ER w/ scap sq, horiz abd w/ scap sq  2x10 green each 20x RTB 2*10 RTB ea.      Ther Ex         Sidelying ER 5x         Cane extension 5x standing rev       Theraband IR/ER         L shoulder PROM  All directions x 8 mins  All directions x 8 min All directions x 8 min     MWM for L shoulder flexion 2x8   2x10 2*10     Wall slides  2x10 2x10 2*10 Dowel flexion  2x10 2x10      pulleys    2*10 flexion              Ther Activity         Pt education POC, HEP  Reviewed POC and HEP                                                            Modalities                              Access Code: [email or web address]  URL: [email or web address]  Date: 10/16/2023  Prepared by: Aurelia Becerril    Exercises  - Sidelying Shoulder External Rotation  - 1 x daily - 7 x weekly - 2 sets - 10 reps - 3 hold  - Standing Shoulder Extension with Dowel  - 1 x daily - 7 x weekly - 2 sets - 10 reps - 5 hold    From 10/20:  Access Code: [email or web address]  URL: [email or web address]  Date: 10/20/2023  Prepared by: Lety [address] with Anchored Resistance  - 1 x daily - 7 x weekly - 3 sets - 10 reps  - Shoulder extension with resistance - Neutral  - 1 x daily - 7 x weekly - 3 sets - 10 reps  - Standing Shoulder External Rotation with Resistance  - 1 x daily - 7 x weekly - 3 sets - 10 reps  - Standing Shoulder Horizontal Abduction with Resistance  - 1 x daily - 7 x weekly - 3 sets - 10 reps  - Shoulder Flexion Wall Slide with Towel  - 1 x daily - 7 x weekly - 3 sets - 10 reps

## 2023-10-26 ENCOUNTER — HOSPITAL ENCOUNTER (OUTPATIENT)
Dept: RADIOLOGY | Facility: HOSPITAL | Age: 73
Discharge: HOME | End: 2023-10-26
Payer: MEDICARE

## 2023-10-26 DIAGNOSIS — M85.89 OTHER SPECIFIED DISORDERS OF BONE DENSITY AND STRUCTURE, MULTIPLE SITES: ICD-10-CM

## 2023-10-26 PROCEDURE — 77080 DXA BONE DENSITY AXIAL: CPT

## 2023-10-27 ENCOUNTER — APPOINTMENT (OUTPATIENT)
Dept: PHYSICAL THERAPY | Facility: CLINIC | Age: 73
End: 2023-10-27
Payer: MEDICARE

## 2023-10-31 ENCOUNTER — TELEPHONE (OUTPATIENT)
Age: 73
End: 2023-10-31

## 2023-10-31 ENCOUNTER — OFFICE VISIT (OUTPATIENT)
Dept: PHYSICAL THERAPY | Facility: CLINIC | Age: 73
End: 2023-10-31
Payer: MEDICARE

## 2023-10-31 DIAGNOSIS — M75.81 ROTATOR CUFF TENDINITIS, RIGHT: ICD-10-CM

## 2023-10-31 DIAGNOSIS — M67.813 BICEPS TENDINOSIS OF RIGHT SHOULDER: Primary | ICD-10-CM

## 2023-10-31 PROCEDURE — 97140 MANUAL THERAPY 1/> REGIONS: CPT

## 2023-10-31 PROCEDURE — 97112 NEUROMUSCULAR REEDUCATION: CPT

## 2023-10-31 PROCEDURE — 97110 THERAPEUTIC EXERCISES: CPT

## 2023-10-31 NOTE — TELEPHONE ENCOUNTER
Caller: Patient    Doctor: Anamaria alba    Reason for call: Patient has been going to physical therapy for her rt shoulder and she feels worse. She is having pain 6/10 and feeling a popping. She mentioned this to her therapist but also wanted to make you aware.     Call back#: [phone]

## 2023-10-31 NOTE — TELEPHONE ENCOUNTER
Spoke w pt and relayed Dr. Matt New message. Pt stated she has PT appt this morning and will follow Dr. Matt New recommendations. Also requested to move appt up with Dr. Joan Barbosa. Appt scheduled for 11/20, pt verbalized understanding.

## 2023-10-31 NOTE — PROGRESS NOTES
Daily Note      Today's date: 10/31/2023  Patient name: Yahaira Godoy  : 1950  MRN: [id number]  Referring provider: Dominguez Casas*  Dx:   Encounter Diagnosis     ICD-10-CM    1. Biceps tendinosis of right shoulder  M67.813       2. Rotator cuff tendinitis, right  M75.81           Subjective: Pt reports that she called the orthopedic office because she has been having more pain and clicking and states that her appointment was moved up from Dec 4 to . Pt states that at rest, her shoulder feels well, but has increased clicking, especially when moving arm to the side. Objective: See treatment diary below    Assessment: Pt tolerated treatment well. Pt noted mildly painful clicking with motions requiring scapular retraction far beyond neutral. Pt presented without clicking or pain while in supine with PROM, likely due to gravity assisting posterior glide of R shoulder. Educated pt that in order to see observable change in R shoulder clicking, it will require consistent strengthening for 4-6 weeks. Pt verbalized understanding. Plan: Continue per plan of care. Progress treatment as tolerated.           Insurance:  AMA/CMS Eval/ Re-eval POC expires FOTO Auth Status Co-Insurance   CMS - [facility] 10/16/23   12/31/23  None req No   MCR                                    1.  10/16 2.   10/20 3.   10/23 4.   10/24 5.   10/31 6.    7.  8.  9.  10.  11.  12.    13.  14.  15.  16.  17.  18.    19.  20. 21. 22. 23. 24.    25.  26. 27. 28. 29. 30.   31. 32.  33. 34. 35. 36.      Precautions: Standard     EVAL 2 3 4 5 6   Manuals 10/16/23 10/20/23 10/23/23 10/24 10/31/23    STM/MFR          Joint mobs     Posterior GH glide gr II-III             Neuro Re-Ed         Rows  Green x30 30x GTB 3*10 GTB 3x10 blue TB    Shoulder extensions  Red x 30 30x RTB 3*10 GTB 3x10 GTB    B/L ER  No resistance x20 No resistance 20x Pulleys with scap retract 20x Pulleys with scap retract 20x    Prone supermans  X10 w/ 5 sec hold 15x5s  15x5s  15x5s    Prone T's   X10 w/ 5 sec holds 15x5s  15x5s  15x3s    B ER w/ scap sq, horiz abd w/ scap sq  2x10 green each 20x RTB 2*10 RTB ea. 2x10 ea. RTB     Ther Ex         Sidelying ER 5x         Cane extension 5x standing rev       Theraband IR/ER         L shoulder PROM  All directions x 8 mins  All directions x 8 min All directions x 8 min All directions x 8 min    MWM for L shoulder flexion 2x8   2x10 2*10 2x10     Wall slides  2x10 2x10 2*10 2x10     Dowel flexion  2x10 2x10      pulleys    2*10 flexion 2x10 flexion             Ther Activity         Pt education POC, HEP  Reviewed POC and HEP                                                            Modalities                              Access Code: [email or web address]  URL: [email or web address]  Date: 10/16/2023  Prepared by: Alex Pulido    Exercises  - Sidelying Shoulder External Rotation  - 1 x daily - 7 x weekly - 2 sets - 10 reps - 3 hold  - Standing Shoulder Extension with Dowel  - 1 x daily - 7 x weekly - 2 sets - 10 reps - 5 hold    From 10/20:  Access Code: [email or web address]  URL: [email or web address]  Date: 10/20/2023  Prepared by: Lety [address] with Anchored Resistance  - 1 x daily - 7 x weekly - 3 sets - 10 reps  - Shoulder extension with resistance - Neutral  - 1 x daily - 7 x weekly - 3 sets - 10 reps  - Standing Shoulder External Rotation with Resistance  - 1 x daily - 7 x weekly - 3 sets - 10 reps  - Standing Shoulder Horizontal Abduction with Resistance  - 1 x daily - 7 x weekly - 3 sets - 10 reps  - Shoulder Flexion Wall Slide with Towel  - 1 x daily - 7 x weekly - 3 sets - 10 reps

## 2023-11-06 ENCOUNTER — OFFICE VISIT (OUTPATIENT)
Dept: PHYSICAL THERAPY | Facility: CLINIC | Age: 73
End: 2023-11-06
Payer: MEDICARE

## 2023-11-06 DIAGNOSIS — M67.813 BICEPS TENDINOSIS OF RIGHT SHOULDER: Primary | ICD-10-CM

## 2023-11-06 DIAGNOSIS — M75.81 ROTATOR CUFF TENDINITIS, RIGHT: ICD-10-CM

## 2023-11-06 PROCEDURE — 97112 NEUROMUSCULAR REEDUCATION: CPT

## 2023-11-06 PROCEDURE — 97110 THERAPEUTIC EXERCISES: CPT

## 2023-11-06 NOTE — PROGRESS NOTES
Daily Note      Today's date: 2023  Patient name: Bin Estrada  : 1950  MRN: [id number]  Referring provider: Doc Lezama*  Dx:   Encounter Diagnosis     ICD-10-CM    1. Biceps tendinosis of right shoulder  M67.813       2. Rotator cuff tendinitis, right  M75.81           Subjective: Pt reports "my arm is killing me today." Pt denies any activity change that may have contributed to this. Pt states that while sitting on the couch and watching TV, she had difficulty reaching for the phone without help from the other arm. Pt states reaching behind the back is also painful and difficult. Pt states she heard back from the nurse at orthopedics and states she was advised to rest, if needed, and to stick with PT. Pt requests to hold off from lying down because she feels she is "on the verge of vertigo."        Objective: See treatment diary below    Assessment: Pt tolerated treatment well. Pt was educated on the nature of overuse/tendinitis conditions and was educated that increased pain with exercise is possible. Pt was educated when exercise/activity modification is needed. Pt was advised that re-assessment will take place next week and will follow up with orthopedics the week following. Pt noted clicking with shoulder flexion MWM in standing with return to starting position during 30-0°, which was also palpable by PT. Pt noted less discomfort with this vs. clicking with retraction/ER movements. Plan: Continue per plan of care. Progress treatment as tolerated.           Insurance:  AMA/CMS Eval/ Re-eval POC expires FOTO Auth Status Co-Insurance   CMS - [facility] 10/16/23   12/31/23  None req No   MCR                                    1.  10/16 2.   10/20 3.   10/23 4.   10/24 5.   10/31 6.   11   7.  8.  9.  10.  11.  12.    13.  14.  15.  16.  17.  18.    19.  20. 21. 22. 23. 24.    25.  26. 27. 28. 29. 30.   31. 32.  33. 34. 35. 36.      Precautions: Standard     EVAL 2 3 4 5 6   Manuals 10/16/23 10/20/23 10/23/23 10/24 10/31/23 11/6/23   STM/MFR          Joint mobs     Posterior GH glide gr II-III             Neuro Re-Ed         Rows  Green x30 30x GTB 3*10 GTB 3x10 blue TB 3x10 blue TB   Shoulder extensions  Red x 30 30x RTB 3*10 GTB 3x10 GTB 3x10 GTB   B/L ER  No resistance x20 No resistance 20x Pulleys with scap retract 20x Pulleys with scap retract 20x Pulleys w/ scap retract 20x    Prone supermans  X10 w/ 5 sec hold 15x5s  15x5s  15x5s Hold    Prone T's   X10 w/ 5 sec holds 15x5s  15x5s  15x3s Standing horizontal abd 20x3s YTB   B ER w/ scap sq, horiz abd w/ scap sq  2x10 green each 20x RTB 2*10 RTB ea. 2x10 ea. RTB  2x10 ea. RTB   Ther Ex         Sidelying ER 5x         Cane extension 5x standing rev       Theraband IR/ER      2x10 ea. IR GTB  ER RTB   L shoulder PROM  All directions x 8 mins  All directions x 8 min All directions x 8 min All directions x 8 min    MWM for L shoulder flexion    2x10 2*10 2x10  2x10 standing   Wall slides  2x10 2x10 2*10 2x10  2x10    Dowel flexion  2x10 2x10      pulleys    2*10 flexion 2x10 flexion 2x10 flexion            Ther Activity         Pt education POC, HEP  Reviewed POC and HEP                                                            Modalities                              Access Code: [email or web address]  URL: [email or web address]  Date: 10/16/2023  Prepared by: Faisal Phillips    Exercises  - Sidelying Shoulder External Rotation  - 1 x daily - 7 x weekly - 2 sets - 10 reps - 3 hold  - Standing Shoulder Extension with Dowel  - 1 x daily - 7 x weekly - 2 sets - 10 reps - 5 hold    From 10/20:  Access Code: [email or web address]  URL: [email or web address]  Date: 10/20/2023  Prepared by: [address] with Anchored Resistance  - 1 x daily - 7 x weekly - 3 sets - 10 reps  - Shoulder extension with resistance - Neutral  - 1 x daily - 7 x weekly - 3 sets - 10 reps  - Standing Shoulder External Rotation with Resistance  - 1 x daily - 7 x weekly - 3 sets - 10 reps  - Standing Shoulder Horizontal Abduction with Resistance  - 1 x daily - 7 x weekly - 3 sets - 10 reps  - Shoulder Flexion Wall Slide with Towel  - 1 x daily - 7 x weekly - 3 sets - 10 reps

## 2023-11-07 ENCOUNTER — OFFICE VISIT (OUTPATIENT)
Dept: PHYSICAL THERAPY | Facility: CLINIC | Age: 73
End: 2023-11-07
Payer: MEDICARE

## 2023-11-07 DIAGNOSIS — M75.81 ROTATOR CUFF TENDINITIS, RIGHT: ICD-10-CM

## 2023-11-07 DIAGNOSIS — M67.813 BICEPS TENDINOSIS OF RIGHT SHOULDER: Primary | ICD-10-CM

## 2023-11-07 PROCEDURE — 97112 NEUROMUSCULAR REEDUCATION: CPT

## 2023-11-07 PROCEDURE — 97110 THERAPEUTIC EXERCISES: CPT

## 2023-11-07 NOTE — PROGRESS NOTES
Daily Note      Today's date: 2023  Patient name: Yahaira Godoy   : 1950  MRN: [id number]  Referring provider: Dominguez Casas*  Dx:   Encounter Diagnosis     ICD-10-CM    1. Biceps tendinosis of right shoulder  M67.813       2. Rotator cuff tendinitis, right  M75.81           Subjective: Pt reports no increase in symptoms since yesterday. Objective: See treatment diary below    Assessment: Pt tolerated treatment well. Pt reported consistent "clicking" in the right shoulder with accompanied discomfort during each repetition of rows and shoulder extension. Pt introduced to bent over rows and also noted consistent clicking, but less overall discomfort compared to during exercises yesterday. Plan: Continue per plan of care. Progress treatment as tolerated. Insurance:  AMA/CMS Eval/ Re-eval POC expires FOTO Auth Status Co-Insurance   CMS - [facility] 10/16/23 12/31/23  None req No   MCR                                    1.  10/16 2.   10/20 3.   10/23 4.   10/24 5.   10/31 6.      7.    8.  9.  10.  11.  12.    13.  14.  15.  16.  17.  18.    19.  20. 21. 22. 23. 24.    25.  26. 27. 28. 29. 30.   31. 32.  33. 34. 35. 36.      Precautions: Standard     3 4 5 6 7    Manuals 10/23/23 10/24 10/31/23 11/6/23 11/7/23    STM/MFR          Joint mobs   Posterior GH glide gr II-III               Neuro Re-Ed         Rows 30x GTB 3*10 GTB 3x10 blue TB 3x10 blue TB 3x10 GTB    Shoulder extensions 30x RTB 3*10 GTB 3x10 GTB 3x10 GTB 3x10 GTB    B/L ER No resistance 20x Pulleys with scap retract 20x Pulleys with scap retract 20x Pulleys w/ scap retract 20x  Pulleys w/ scap retract 20x    Prone supermans 15x5s  15x5s  15x5s Hold      Prone T's  15x5s  15x5s  15x3s Standing horizontal abd 20x3s YTB Standing horizontal abd 2x10 YTB    B ER w/ scap sq, horiz abd w/ scap sq 20x RTB 2*10 RTB ea. 2x10 ea. RTB  2x10 ea.  RTB 2x10 YTB    Ther Ex         Sidelying ER         Cane extension Theraband IR/ER    2x10 ea. IR GTB  ER RTB 2x10 ea. IR GTB  ER RTB    L shoulder PROM All directions x 8 min All directions x 8 min All directions x 8 min      MWM for L shoulder flexion  2x10 2*10 2x10  2x10 standing 2x10 standing    Wall slides 2x10 2*10 2x10  2x10  2x10     Dowel flexion 2x10        pulleys  2*10 flexion 2x10 flexion 2x10 flexion 2x10 flexion    Bent over row     2x10 3#     Ther Activity         Pt education Reviewed POC and HEP    Reviewed POC                                                           Modalities                              Access Code: [email or web address]  URL: [email or web address]  Date: 10/16/2023  Prepared by: Elise Sweeney    Exercises  - Sidelying Shoulder External Rotation  - 1 x daily - 7 x weekly - 2 sets - 10 reps - 3 hold  - Standing Shoulder Extension with Dowel  - 1 x daily - 7 x weekly - 2 sets - 10 reps - 5 hold    From 10/20:  Access Code: [email or web address]  URL: [email or web address]  Date: 10/20/2023  Prepared by: [address] with Anchored Resistance  - 1 x daily - 7 x weekly - 3 sets - 10 reps  - Shoulder extension with resistance - Neutral  - 1 x daily - 7 x weekly - 3 sets - 10 reps  - Standing Shoulder External Rotation with Resistance  - 1 x daily - 7 x weekly - 3 sets - 10 reps  - Standing Shoulder Horizontal Abduction with Resistance  - 1 x daily - 7 x weekly - 3 sets - 10 reps  - Shoulder Flexion Wall Slide with Towel  - 1 x daily - 7 x weekly - 3 sets - 10 reps

## 2023-11-13 ENCOUNTER — OFFICE VISIT (OUTPATIENT)
Dept: PHYSICAL THERAPY | Facility: CLINIC | Age: 73
End: 2023-11-13
Payer: MEDICARE

## 2023-11-13 DIAGNOSIS — M75.81 ROTATOR CUFF TENDINITIS, RIGHT: ICD-10-CM

## 2023-11-13 DIAGNOSIS — M67.813 BICEPS TENDINOSIS OF RIGHT SHOULDER: Primary | ICD-10-CM

## 2023-11-13 PROCEDURE — 97110 THERAPEUTIC EXERCISES: CPT

## 2023-11-13 PROCEDURE — 97112 NEUROMUSCULAR REEDUCATION: CPT

## 2023-11-13 NOTE — PROGRESS NOTES
Daily Note      Today's date: 2023  Patient name: Elicia Carter  : 1950  MRN: [id number]  Referring provider: Lexi Main  Dx:   Encounter Diagnosis     ICD-10-CM    1. Biceps tendinosis of right shoulder  M67.813       2. Rotator cuff tendinitis, right  M75.81           Subjective: Pt reports that her vertigo has been bothering her over the weekend, particularly Saturday. Pt states that she has had to move slower in order to avoid aggravating it. Objective: See treatment diary below    Assessment: Pt tolerated treatment well. Pt continued to note consistent "clicking" while performing movements requiring scapular retraction. Pt presented with significantly less clicking and discomfort with shoulder flexion with PT GH glide in posterior and inferior directions with manual scapular retraction. Educated pt that the goal of posterior strengthening is to influence GHJ positioning in this direction without requiring external support. Pt verbalized understanding. Plan: Continue per plan of care. Progress treatment as tolerated. Insurance:  AMA/CMS Eval/ Re-eval POC expires FOTO Auth Status Co-Insurance   CMS - [facility] 10/16/23 12/31/23  None req No   MCR                                    1.  10/16 2.   10/20 3.   10/23 4.   10/24 5.   10/31 6.      7.    8.    9.   10.  11.  12.    13.  14.  15.  16.  17.  18.    19.  20. 21. 22. 23. 24.    25.  26. 27. 28. 29. 30.   31. 32.  33. 34. 35. 36.      Precautions: Standard     3 4 5 6 7 8   Manuals 10/23/23 10/24 10/31/23 11/6/23 11/7/23 11/13/23   STM/MFR          Joint mobs   Posterior GH glide gr II-III               Neuro Re-Ed         Rows 30x GTB 3*10 GTB 3x10 blue TB 3x10 blue TB 3x10 GTB 2x10 GTB   Shoulder extensions 30x RTB 3*10 GTB 3x10 GTB 3x10 GTB 3x10 GTB 2x10 RTB   B/L ER No resistance 20x Pulleys with scap retract 20x Pulleys with scap retract 20x Pulleys w/ scap retract 20x  Pulleys w/ scap retract 20x Pulleys w Shailesh Jones retract 20x    Prone supermans 15x5s  15x5s  15x5s Hold      Prone T's  15x5s  15x5s  15x3s Standing horizontal abd 20x3s YTB Standing horizontal abd 2x10 YTB Standing horizontal abd 2x10 YTB   B ER w/ scap sq, horiz abd w/ scap sq 20x RTB 2*10 RTB ea. 2x10 ea. RTB  2x10 ea. RTB 2x10 YTB 2x10 YTB   Ther Ex         Sidelying ER         Cane extension         Theraband IR/ER    2x10 ea. IR GTB  ER RTB 2x10 ea. IR GTB  ER RTB 20x ea. IR GTB  ER RTB   L shoulder PROM All directions x 8 min All directions x 8 min All directions x 8 min      MWM for L shoulder flexion  2x10 2*10 2x10  2x10 standing 2x10 standing 2x10 standing   Wall slides 2x10 2*10 2x10  2x10  2x10  2x10   Dowel flexion 2x10        pulleys  2*10 flexion 2x10 flexion 2x10 flexion 2x10 flexion 2x10 flexion   Bent over row     2x10 3#  2x10 4#   Ther Activity         Pt education Reviewed POC and HEP    Reviewed POC                                                           Modalities                              Access Code: [email or web address]  URL: [email or web address]  Date: 10/16/2023  Prepared by: Saige Huge    Exercises  - Sidelying Shoulder External Rotation  - 1 x daily - 7 x weekly - 2 sets - 10 reps - 3 hold  - Standing Shoulder Extension with Dowel  - 1 x daily - 7 x weekly - 2 sets - 10 reps - 5 hold    From 10/20:  Access Code: [email or web address]  URL: [email or web address]  Date: 10/20/2023  Prepared by: [address] with Anchored Resistance  - 1 x daily - 7 x weekly - 3 sets - 10 reps  - Shoulder extension with resistance - Neutral  - 1 x daily - 7 x weekly - 3 sets - 10 reps  - Standing Shoulder External Rotation with Resistance  - 1 x daily - 7 x weekly - 3 sets - 10 reps  - Standing Shoulder Horizontal Abduction with Resistance  - 1 x daily - 7 x weekly - 3 sets - 10 reps  - Shoulder Flexion Wall Slide with Towel  - 1 x daily - 7 x weekly - 3 sets - 10 reps

## 2023-11-20 ENCOUNTER — OFFICE VISIT (OUTPATIENT)
Dept: OBGYN CLINIC | Facility: CLINIC | Age: 73
End: 2023-11-20
Payer: MEDICARE

## 2023-11-20 VITALS
WEIGHT: 219 LBS | HEIGHT: 63 IN | SYSTOLIC BLOOD PRESSURE: 146 MMHG | BODY MASS INDEX: 38.8 KG/M2 | HEART RATE: 64 BPM | DIASTOLIC BLOOD PRESSURE: 87 MMHG

## 2023-11-20 DIAGNOSIS — M24.811 INTERNAL DERANGEMENT OF RIGHT SHOULDER: Primary | ICD-10-CM

## 2023-11-20 PROCEDURE — 99213 OFFICE O/P EST LOW 20 MIN: CPT | Performed by: ORTHOPAEDIC SURGERY

## 2023-11-20 NOTE — PROGRESS NOTES
Assessment/Plan:  1. Internal derangement of right shoulder  MRI shoulder right wo contrast        Scribe Attestation      I,:  Dorys Pettit am acting as a scribe while in the presence of the attending physician.:       I,:  Maribell Bran MD personally performed the services described in this documentation    as scribed in my presence.:               Channing Simms and I had a discussion on how to proceed. She continues to have a painful clicking sensation in the anterior aspect of the right shoulder. I continue to have the opinion that this is her proximal bicep tendon subluxing out of the bicipital groove. This could indicate a tear to the subscapularis tendon. She does have a positive bear hug test which can indicate a tear to the subscapularis. I would like her to have an MRI of the right shoulder questioning a rotator cuff tear versus proximal biceps tendinitis. In the meantime I would like her to continue with physical therapy for an additional 2 weeks. If this is proximal biceps tendinitis only we can consider a referral to my colleague Dr. Louise Majano for an ultrasound-guided injection. Should a tear be found in the rotator cuff she may require surgical intervention. The data from the MRI is valuable in developing a plan of care. Channing Simms was agreeable and had no further questions. I will see her back once the MRIs been completed and the report has been provided by radiology. Subjective:   Jamal Guerrier is a 74 y.o. female who presents to the office today for follow up evaluation of her right shoulder. She was diagnosed with right biceps tendinitis and rotator cuff tendinitis and prescribed physical therapy. She has attended physical therapy for 8 sessions. Channing Simms states she feels physical therapy did help some but she continues to have a painful snapping sensation in the anterior aspect of her right shoulder when retracting her scapula. She considers this pain to be moderate.   Reaching behind her back will also cause a pulling type sensation in the anterior aspect of the shoulder. She will apply heat which helps. She denies radiating pain or distal paresthesias. She has no pain with rest.      Review of Systems   Constitutional:  Negative for chills, fever and unexpected weight change. HENT:  Negative for hearing loss, nosebleeds and sore throat. Eyes:  Negative for pain, redness and visual disturbance. Respiratory:  Negative for cough, shortness of breath and wheezing. Cardiovascular:  Negative for chest pain, palpitations and leg swelling. Gastrointestinal:  Negative for abdominal pain, nausea and vomiting. Endocrine: Negative for polydipsia and polyuria. Genitourinary:  Negative for dysuria and hematuria. Musculoskeletal:         See HPI   Skin:  Negative for rash and wound. Neurological:  Negative for dizziness, numbness and headaches. Psychiatric/Behavioral:  Negative for decreased concentration and suicidal ideas. The patient is not nervous/anxious.           Past Medical History:   Diagnosis Date    Breast cyst        Past Surgical History:   Procedure Laterality Date    BREAST CYST EXCISION Right 1971    2 rt breast cysts removed     SECTION      [address]    COLONOSCOPY         Family History   Problem Relation Age of Onset    Heart disease Father     Breast cancer Other         unkown age    No Known Problems Sister     No Known Problems Daughter     No Known Problems Maternal Grandmother     No Known Problems Paternal Grandmother     No Known Problems Maternal Aunt     No Known Problems Maternal Aunt     No Known Problems Paternal Aunt        Social History     Occupational History    Not on file   Tobacco Use    Smoking status: Never    Smokeless tobacco: Never   Vaping Use    Vaping Use: Never used   Substance and Sexual Activity    Alcohol use: No    Drug use: No    Sexual activity: Not on file         Current Outpatient Medications:     Lutein 40 MG CAPS, Take by mouth, Disp: , Rfl:     Multiple Vitamins-Minerals (MULTIVITAMIN WITH MINERALS) tablet, Take 1 tablet by mouth daily, Disp: , Rfl:     prednisoLONE acetate (PRED FORTE) 1 % ophthalmic suspension, INSTILL 1 DROP INTO LEFT EYES 4 TIMES A DAY, Disp: , Rfl:     No Known Allergies    Objective:  Vitals:    11/20/23 1120   BP: 146/87   Pulse: 64       Right Shoulder Exam     Tenderness   The patient is experiencing tenderness in the biceps tendon. Range of Motion   Active abduction:  160   External rotation:  80   Forward flexion:  170   Internal rotation 0 degrees:  L1     Muscle Strength   Abduction: 5/5   Internal rotation: 5/5   External rotation: 5/5   Supraspinatus: 5/5   Subscapularis: 5/5   Biceps: 5/5     Tests   Márquez test: negative  Impingement: positive    Other   Sensation: normal  Pulse: present (2+ radial)    Comments:  Bear Hug 4/5            Physical Exam  Vitals reviewed. Constitutional:       Appearance: She is well-developed. HENT:      Head: Normocephalic and atraumatic. Eyes:      General:         Right eye: No discharge. Left eye: No discharge. Conjunctiva/sclera: Conjunctivae normal.   Cardiovascular:      Rate and Rhythm: Regular rhythm. Pulmonary:      Effort: Pulmonary effort is normal. No respiratory distress. Breath sounds: No stridor. Musculoskeletal:      Cervical back: Normal range of motion and neck supple. Skin:     General: Skin is warm and dry. Neurological:      Mental Status: She is alert and oriented to person, place, and time. Psychiatric:         Behavior: Behavior normal.         I have personally reviewed pertinent films in PACS and my interpretation is as follows:  X-rays from October 9, 2023 were reviewed and found to be normal.      This document was created using speech voice recognition software.    Grammatical errors, random word insertions, pronoun errors, and incomplete sentences are an occasional consequence of this system due to software limitations, ambient noise, and hardware issues. Any formal questions or concerns about content, text, or information contained within the body of this dictation should be directly addressed to the provider for clarification.

## 2023-12-03 ENCOUNTER — HOSPITAL ENCOUNTER (OUTPATIENT)
Dept: MRI IMAGING | Facility: HOSPITAL | Age: 73
Discharge: HOME | End: 2023-12-03
Attending: ORTHOPAEDIC SURGERY
Payer: MEDICARE

## 2023-12-03 DIAGNOSIS — M24.811 INTERNAL DERANGEMENT OF RIGHT SHOULDER: ICD-10-CM

## 2023-12-03 PROCEDURE — G1004 CDSM NDSC: HCPCS

## 2023-12-03 PROCEDURE — 73221 MRI JOINT UPR EXTREM W/O DYE: CPT

## 2023-12-05 ENCOUNTER — OFFICE VISIT (OUTPATIENT)
Dept: OBGYN CLINIC | Facility: CLINIC | Age: 73
End: 2023-12-05
Payer: MEDICARE

## 2023-12-05 VITALS
DIASTOLIC BLOOD PRESSURE: 94 MMHG | SYSTOLIC BLOOD PRESSURE: 163 MMHG | HEIGHT: 63 IN | BODY MASS INDEX: 38.8 KG/M2 | WEIGHT: 219 LBS | HEART RATE: 72 BPM

## 2023-12-05 DIAGNOSIS — M19.011 OSTEOARTHRITIS OF RIGHT AC (ACROMIOCLAVICULAR) JOINT: ICD-10-CM

## 2023-12-05 DIAGNOSIS — R79.9 ABNORMAL FINDING OF BLOOD CHEMISTRY, UNSPECIFIED: ICD-10-CM

## 2023-12-05 DIAGNOSIS — M75.121 NONTRAUMATIC COMPLETE TEAR OF RIGHT ROTATOR CUFF: ICD-10-CM

## 2023-12-05 DIAGNOSIS — M75.21 BICEPS TENDINITIS OF RIGHT SHOULDER: ICD-10-CM

## 2023-12-05 DIAGNOSIS — Z01.818 PRE-OP EVALUATION: Primary | ICD-10-CM

## 2023-12-05 PROCEDURE — 99214 OFFICE O/P EST MOD 30 MIN: CPT | Performed by: ORTHOPAEDIC SURGERY

## 2023-12-05 NOTE — PROGRESS NOTES
Orthopedic Sports Medicine Follow Up Patient Visit     Assesment:   74 y.o. female with right atraumatic rotator cuff tear, biceps tendinitis, and mild osteoarthritis of AC joint    Plan: We had a long discussion regarding the diagnosis and treatment plan. We discussed options for operative and nonoperative treatment. Specifically, nonoperative treatment options would include continued PT, steroid injections, OTC medications as needed, and activity modifications. Because the patient has persistent shoulder pain in the setting of a rotator cuff tear noted on MRI and attempting various non-operative interventions without symptomatic relief, I recommended surgical intervention with a right rotator cuff repair and biceps tenotomy. The patient does have mild AC joint tenderness today, but does not typically localize pain here or feel this limits her. We will reevaluate the [facility] joint on the day of surgery for consideration of distal clavicle excision, but will not consent for this today. We had a detailed discussion of the risks, benefits, and alternatives to this procedure. The risks include but are not limited to infection, bleeding, stiffness, loss of range of motion, blood clot, failure of surgery, fracture, risk of potential future arthritis, swelling, injury to surrounding structures/nerve/artery/vein, failure of medical implants or surgical instruments, retained hardware and/or foreign body, and continued pain/dysfunction/disability. We discussed the expected timeline for recovery including the timeline for return to work and sporting activities. The patient expressed good understanding and elected to proceed. They will meet be scheduled at a mutually convenient time in the near future. I recommended the patient see her PCP for pre-op clearance and appropriate lab work as well as an EKG. The patient is a non-smoker and denies history of cardiac disease, pulmonary disease, and diabetes.      The patient was fit for a post-op sling for her to bring with her on the day of surgery. We will plan to start physical therapy per protocol provided on the day of surgery. Follow up 10-14 days after surgery. Chief Complaint   Patient presents with    Right Shoulder - Pain     MRI review         History of Present Illness: The patient is a 74 y.o., left hand dominant, female, presenting for follow up evaluation of atraumatic right shoulder pain and MRI review. Today, the patient notes 10/10 pain localized to the anterior and lateral aspects of the shoulder. She states pain is exacerbated following PT sessions. Power Sullivan denies weakness, limited range of motion, new injury or trauma, neck pain, and numbness/tingling. She is able to complete ADLs, but pushes through pain to do so. The patient did complete a consistent course of PT. She has not received a steroid injection into the shoulder so far. The patient is interested in discussing surgical options today. Affected shoulder SANE score: 85    Shoulder Surgical History:  None    Past Medical, Social and Family History:  Past Medical History:   Diagnosis Date    Breast cyst      Past Surgical History:   Procedure Laterality Date    BREAST CYST EXCISION Right 1971    2 rt breast cysts removed     SECTION      and [address], [facility]    COLONOSCOPY       No Known Allergies  Current Outpatient Medications on File Prior to Visit   Medication Sig Dispense Refill    Lutein 40 MG CAPS Take by mouth      Multiple Vitamins-Minerals (MULTIVITAMIN WITH MINERALS) tablet Take 1 tablet by mouth daily      prednisoLONE acetate (PRED FORTE) 1 % ophthalmic suspension INSTILL 1 DROP INTO LEFT EYES 4 TIMES A DAY       No current facility-administered medications on file prior to visit.      Social History     Socioeconomic History    Marital status:      Spouse name: Not on file    Number of children: Not on file    Years of education: Not on file Highest education level: Not on file   Occupational History    Not on file   Tobacco Use    Smoking status: Never    Smokeless tobacco: Never   Vaping Use    Vaping Use: Never used   Substance and Sexual Activity    Alcohol use: No    Drug use: No    Sexual activity: Not on file   Other Topics Concern    Not on file   Social History Narrative    Not on file     Social Determinants of Health     Financial Resource Strain: Not on file   Food Insecurity: Not on file   Transportation Needs: Not on file   Physical Activity: Not on file   Stress: Not on file   Social Connections: Not on file   Intimate Partner Violence: Not on file   Housing Stability: Not on file         I have reviewed the past medical, surgical, social and family history, medications and allergies as documented in the EMR. Review of systems: ROS is negative other than that noted in the HPI. Constitutional: Negative for fatigue and fever. HENT: Negative for sore throat. Respiratory: Negative for shortness of breath. Cardiovascular: Negative for chest pain. Gastrointestinal: Negative for abdominal pain. Endocrine: Negative for cold intolerance and heat intolerance. Genitourinary: Negative for flank pain. Musculoskeletal: Negative for back pain. Skin: Negative for rash. Allergic/Immunologic: Negative for immunocompromised state. Neurological: Negative for dizziness. Psychiatric/Behavioral: Negative for agitation. Physical Exam:    Blood pressure 163/94, pulse 72, height 5' 3" (1.6 m), weight 99.3 kg (219 lb), not currently breastfeeding.     General/Constitutional: NAD, well developed, well nourished  HENT: Normocephalic, atraumatic  CV: Intact distal pulses, regular rate  Resp: No respiratory distress or labored breathing  Abdomen: soft, nondistended, non tender   Lymphatic: No lymphadenopathy palpated  Neuro: Alert and Oriented x 3, no focal deficits  Psych: Normal mood, normal affect  Skin: Warm, dry, no rashes, no erythema      Shoulder Exam:      Inspection: No ecchymosis, edema, or deformity. No visualized wounds or skin lesions   Palpation: mild AC joint tenderness. Moderate bicipital groove tenderness  Active Motion:       FF: 160, symmetric        ER: 60 compared to 70 contralaterally        IR: T12, symmetric  Strength: 4+/5 empty can, 4+/5 ER,  5/5 IR   Sensory - SILT in the Radial / Ulnar / Median / Axillary nerve distributions  Motor - AIN / PIN / Radial / Ulnar / Median / Axillary motor nerves in tact  Palpable Radial pulse  Cap refill <2secs in all digits      Imaging    I reviewed and interpreted MRI of the right shoulder which shows IMPRESSION:     1. Complete (full-thickness and full width) supraspinatus insertional tear, with 1.4 cm tendon retraction. No muscle atrophy. 2. Mild infraspinatus tendinosis with low-grade interstitial tear. 3. Subscapularis tendinosis with low-grade interstitial tear resulting in medial subluxation of long head of biceps which is perched over the lesser tuberosity. 4. Medially subluxed long head of biceps with moderate tendinosis.

## 2023-12-07 RX ORDER — CHLORHEXIDINE GLUCONATE ORAL RINSE 1.2 MG/ML
15 SOLUTION DENTAL ONCE
OUTPATIENT
Start: 2023-12-07 | End: 2023-12-07

## 2023-12-20 RX ORDER — UBIDECARENONE 50 MG
CAPSULE ORAL
COMMUNITY

## 2023-12-20 RX ORDER — LANOLIN ALCOHOL/MO/W.PET/CERES
1 CREAM (GRAM) TOPICAL 3 TIMES DAILY
COMMUNITY

## 2023-12-20 NOTE — PRE-PROCEDURE INSTRUCTIONS
Pre-Surgery Instructions:   Medication Instructions    Coenzyme Q10 (CoQ10) 50 MG CAPS Stop taking 7 days prior to surgery.    glucosamine-chondroitin 500-400 MG tablet Stop taking 7 days prior to surgery.    Lutein 40 MG CAPS Stop taking 7 days prior to surgery.    Multiple Vitamins-Minerals (MULTIVITAMIN WITH MINERALS) tablet Stop taking 7 days prior to surgery.    Medication instructions for day surgery reviewed. Please use only a sip of water to take your instructed medications. Avoid all over the counter vitamins, supplements and NSAIDS for one week prior to surgery per anesthesia guidelines. Tylenol is ok to take as needed.     You will receive a call one business day prior to surgery with an arrival time and hospital directions. If your surgery is scheduled on a Monday, the hospital will be calling you on the Friday prior to your surgery. If you have not heard from anyone by 8pm, please call the hospital supervisor through the hospital  at [phone]. (Darryl [phone]).    Do not eat or drink anything after midnight the night before your surgery, including candy, mints, lifesavers, or chewing gum. Do not drink alcohol 24hrs before your surgery. Try not to smoke at least 24hrs before your surgery.       Follow the pre surgery showering instructions as listed in the “My Surgical Experience Booklet” or otherwise provided by your surgeon's office. Do not use a blade to shave the surgical area 1 week before surgery. It is okay to use a clean electric clippers up to 24 hours before surgery. Do not apply any lotions, creams, including makeup, cologne, deodorant, or perfumes after showering on the day of your surgery. Do not use dry shampoo, hair spray, hair gel, or any type of hair products.     No contact lenses, eye make-up, or artificial eyelashes. Remove nail polish, including gel polish, and any artificial, gel, or acrylic nails if possible. Remove all jewelry including rings and body piercing  jewelry.     Wear causal clothing that is easy to take on and off. Consider your type of surgery.    Keep any valuables, jewelry, piercings at home. Please bring any specially ordered equipment (sling, braces) if indicated.    Arrange for a responsible person to drive you to and from the hospital on the day of your surgery. Visitor Guidelines discussed.     Call the surgeon's office with any new illnesses, exposures, or additional questions prior to surgery.    Please reference your “My Surgical Experience Booklet” for additional information to prepare for your upcoming surgery.

## 2023-12-28 ENCOUNTER — APPOINTMENT (OUTPATIENT)
Dept: LAB | Facility: HOSPITAL | Age: 73
End: 2023-12-28
Payer: MEDICARE

## 2023-12-28 DIAGNOSIS — R79.9 ABNORMAL FINDING OF BLOOD CHEMISTRY, UNSPECIFIED: ICD-10-CM

## 2023-12-28 DIAGNOSIS — M75.121 NONTRAUMATIC COMPLETE TEAR OF RIGHT ROTATOR CUFF: ICD-10-CM

## 2023-12-28 DIAGNOSIS — Z01.818 PRE-OP EVALUATION: ICD-10-CM

## 2023-12-28 DIAGNOSIS — M75.21 BICEPS TENDINITIS OF RIGHT SHOULDER: ICD-10-CM

## 2023-12-28 DIAGNOSIS — M19.011 OSTEOARTHRITIS OF RIGHT AC (ACROMIOCLAVICULAR) JOINT: ICD-10-CM

## 2023-12-28 LAB
ANION GAP SERPL CALCULATED.3IONS-SCNC: 6 MMOL/L
ATRIAL RATE: 68 BPM
BASOPHILS # BLD AUTO: 0.04 THOUSANDS/ÂΜL (ref 0–0.1)
BASOPHILS NFR BLD AUTO: 1 % (ref 0–1)
BUN SERPL-MCNC: 20 MG/DL (ref 5–25)
CALCIUM SERPL-MCNC: 9.3 MG/DL (ref 8.4–10.2)
CHLORIDE SERPL-SCNC: 103 MMOL/L (ref 96–108)
CO2 SERPL-SCNC: 30 MMOL/L (ref 21–32)
CREAT SERPL-MCNC: 0.92 MG/DL (ref 0.6–1.3)
EOSINOPHIL # BLD AUTO: 0.14 THOUSAND/ÂΜL (ref 0–0.61)
EOSINOPHIL NFR BLD AUTO: 2 % (ref 0–6)
ERYTHROCYTE [DISTWIDTH] IN BLOOD BY AUTOMATED COUNT: 15.5 % (ref 11.6–15.1)
EST. AVERAGE GLUCOSE BLD GHB EST-MCNC: 123 MG/DL
GFR SERPL CREATININE-BSD FRML MDRD: 61 ML/MIN/1.73SQ M
GLUCOSE P FAST SERPL-MCNC: 89 MG/DL (ref 65–99)
HBA1C MFR BLD: 5.9 %
HCT VFR BLD AUTO: 44 % (ref 34.8–46.1)
HGB BLD-MCNC: 13.4 G/DL (ref 11.5–15.4)
IMM GRANULOCYTES # BLD AUTO: 0.02 THOUSAND/UL (ref 0–0.2)
IMM GRANULOCYTES NFR BLD AUTO: 0 % (ref 0–2)
LYMPHOCYTES # BLD AUTO: 1.97 THOUSANDS/ÂΜL (ref 0.6–4.47)
LYMPHOCYTES NFR BLD AUTO: 28 % (ref 14–44)
MCH RBC QN AUTO: 26.6 PG (ref 26.8–34.3)
MCHC RBC AUTO-ENTMCNC: 30.5 G/DL (ref 31.4–37.4)
MCV RBC AUTO: 87 FL (ref 82–98)
MONOCYTES # BLD AUTO: 0.55 THOUSAND/ÂΜL (ref 0.17–1.22)
MONOCYTES NFR BLD AUTO: 8 % (ref 4–12)
NEUTROPHILS # BLD AUTO: 4.37 THOUSANDS/ÂΜL (ref 1.85–7.62)
NEUTS SEG NFR BLD AUTO: 61 % (ref 43–75)
NRBC BLD AUTO-RTO: 0 /100 WBCS
P AXIS: 9 DEGREES
PLATELET # BLD AUTO: 231 THOUSANDS/UL (ref 149–390)
PMV BLD AUTO: 10.5 FL (ref 8.9–12.7)
POTASSIUM SERPL-SCNC: 4.5 MMOL/L (ref 3.5–5.3)
PR INTERVAL: 176 MS
QRS AXIS: -27 DEGREES
QRSD INTERVAL: 68 MS
QT INTERVAL: 382 MS
QTC INTERVAL: 406 MS
RBC # BLD AUTO: 5.04 MILLION/UL (ref 3.81–5.12)
SODIUM SERPL-SCNC: 139 MMOL/L (ref 135–147)
T WAVE AXIS: 17 DEGREES
VENTRICULAR RATE: 68 BPM
WBC # BLD AUTO: 7.09 THOUSAND/UL (ref 4.31–10.16)

## 2023-12-28 PROCEDURE — 83036 HEMOGLOBIN GLYCOSYLATED A1C: CPT

## 2023-12-28 PROCEDURE — 93010 ELECTROCARDIOGRAM REPORT: CPT | Performed by: INTERNAL MEDICINE

## 2023-12-28 PROCEDURE — 36415 COLL VENOUS BLD VENIPUNCTURE: CPT

## 2023-12-28 PROCEDURE — 80048 BASIC METABOLIC PNL TOTAL CA: CPT

## 2023-12-28 PROCEDURE — 85025 COMPLETE CBC W/AUTO DIFF WBC: CPT

## 2023-12-28 PROCEDURE — 93005 ELECTROCARDIOGRAM TRACING: CPT

## 2024-01-02 ENCOUNTER — TELEPHONE (OUTPATIENT)
Age: 74
End: 2024-01-02

## 2024-01-02 ENCOUNTER — ANESTHESIA EVENT (OUTPATIENT)
Dept: PERIOP | Facility: HOSPITAL | Age: 74
End: 2024-01-02
Payer: MEDICARE

## 2024-01-02 NOTE — TELEPHONE ENCOUNTER
Caller: patient     Doctor: Massimo    Reason for call: calling about her sx clearance     Call back#: [phone]

## 2024-01-02 NOTE — TELEPHONE ENCOUNTER
Called and spoke to the patient, advised that we are still waiting for her PCP to fax her medical clearance.  Rqst has been sent 2X

## 2024-01-03 NOTE — ANESTHESIA PREPROCEDURE EVALUATION
Procedure:  Right shoulder arthroscopic rotator cuff repair, biceps tenotomy, possible over distal clavicle excision. (Right: Shoulder)    ECG: NSR     A1c 5.9    Relevant Problems   ANESTHESIA (within normal limits)      CARDIO   (+) Hyperlipidemia      PULMONARY (within normal limits)        Physical Exam    Airway    Mallampati score: II  TM Distance: >3 FB  Neck ROM: full     Dental   No notable dental hx     Cardiovascular  Rhythm: regular, Rate: normal    Pulmonary   Breath sounds clear to auscultation    Other Findings  post-pubertal.      Anesthesia Plan  ASA Score- 2     Anesthesia Type- general with ASA Monitors.         Additional Monitors:     Airway Plan: ETT.    Comment: Interscalene block.       Plan Factors-Exercise tolerance (METS): >4 METS.    Chart reviewed. EKG reviewed.  Existing labs reviewed. Patient summary reviewed.    Patient is not a current smoker.              Induction- intravenous.    Postoperative Plan- . Planned trial extubation    Informed Consent- Anesthetic plan and risks discussed with patient.  I personally reviewed this patient with the CRNA. Discussed and agreed on the Anesthesia Plan with the CRNA..

## 2024-01-04 ENCOUNTER — HOSPITAL ENCOUNTER (OUTPATIENT)
Facility: HOSPITAL | Age: 74
Setting detail: OUTPATIENT SURGERY
Discharge: HOME | End: 2024-01-04
Attending: ORTHOPAEDIC SURGERY | Admitting: ORTHOPAEDIC SURGERY
Payer: MEDICARE

## 2024-01-04 ENCOUNTER — ANESTHESIA (OUTPATIENT)
Dept: PERIOP | Facility: HOSPITAL | Age: 74
End: 2024-01-04
Payer: MEDICARE

## 2024-01-04 VITALS
HEIGHT: 63 IN | WEIGHT: 218.03 LBS | SYSTOLIC BLOOD PRESSURE: 119 MMHG | OXYGEN SATURATION: 98 % | RESPIRATION RATE: 18 BRPM | DIASTOLIC BLOOD PRESSURE: 62 MMHG | HEART RATE: 72 BPM | TEMPERATURE: 97.8 F | BODY MASS INDEX: 38.63 KG/M2

## 2024-01-04 DIAGNOSIS — Z98.890 S/P RIGHT ROTATOR CUFF REPAIR: Primary | ICD-10-CM

## 2024-01-04 DIAGNOSIS — M75.121 NONTRAUMATIC COMPLETE TEAR OF RIGHT ROTATOR CUFF: ICD-10-CM

## 2024-01-04 PROBLEM — E78.5 HYPERLIPIDEMIA: Status: ACTIVE | Noted: 2024-01-04

## 2024-01-04 PROCEDURE — C1713 ANCHOR/SCREW BN/BN,TIS/BN: HCPCS | Performed by: ORTHOPAEDIC SURGERY

## 2024-01-04 PROCEDURE — 29827 SHO ARTHRS SRG RT8TR CUF RPR: CPT

## 2024-01-04 PROCEDURE — 29827 SHO ARTHRS SRG RT8TR CUF RPR: CPT | Performed by: ORTHOPAEDIC SURGERY

## 2024-01-04 PROCEDURE — 29823 SHO ARTHRS SRG XTNSV DBRDMT: CPT

## 2024-01-04 PROCEDURE — 29823 SHO ARTHRS SRG XTNSV DBRDMT: CPT | Performed by: ORTHOPAEDIC SURGERY

## 2024-01-04 PROCEDURE — NC001 PR NO CHARGE: Performed by: ORTHOPAEDIC SURGERY

## 2024-01-04 PROCEDURE — C9290 INJ, BUPIVACAINE LIPOSOME: HCPCS | Performed by: ANESTHESIOLOGY

## 2024-01-04 DEVICE — 4.75MM BC KNOTLESS SWIVELOCK
Type: IMPLANTABLE DEVICE | Site: SHOULDER | Status: FUNCTIONAL
Brand: ARTHREX®

## 2024-01-04 DEVICE — SP FBRTAK RC FBRTPE BLU & STTPE WH/BLK
Type: IMPLANTABLE DEVICE | Site: SHOULDER | Status: FUNCTIONAL
Brand: ARTHREX®

## 2024-01-04 DEVICE — SP FBRTAK RC FBRTPE BLK/BLU & STTPE BLU
Type: IMPLANTABLE DEVICE | Site: SHOULDER | Status: FUNCTIONAL
Brand: ARTHREX®

## 2024-01-04 DEVICE — BIO-COMP SWIVELOCK C, CLD 5.5X19.1MM
Type: IMPLANTABLE DEVICE | Site: SHOULDER | Status: FUNCTIONAL
Brand: ARTHREX®

## 2024-01-04 RX ORDER — FENTANYL CITRATE 50 UG/ML
INJECTION, SOLUTION INTRAMUSCULAR; INTRAVENOUS
Status: COMPLETED | OUTPATIENT
Start: 2024-01-04 | End: 2024-01-04

## 2024-01-04 RX ORDER — NAPROXEN 500 MG/1
500 TABLET ORAL 2 TIMES DAILY WITH MEALS
Qty: 20 TABLET | Refills: 0 | Status: SHIPPED | OUTPATIENT
Start: 2024-01-04

## 2024-01-04 RX ORDER — ONDANSETRON 2 MG/ML
4 INJECTION INTRAMUSCULAR; INTRAVENOUS ONCE AS NEEDED
Status: DISCONTINUED | OUTPATIENT
Start: 2024-01-04 | End: 2024-01-04 | Stop reason: HOSPADM

## 2024-01-04 RX ORDER — ONDANSETRON 2 MG/ML
INJECTION INTRAMUSCULAR; INTRAVENOUS AS NEEDED
Status: DISCONTINUED | OUTPATIENT
Start: 2024-01-04 | End: 2024-01-04

## 2024-01-04 RX ORDER — PROPOFOL 10 MG/ML
INJECTION, EMULSION INTRAVENOUS AS NEEDED
Status: DISCONTINUED | OUTPATIENT
Start: 2024-01-04 | End: 2024-01-04

## 2024-01-04 RX ORDER — OXYCODONE HYDROCHLORIDE 5 MG/1
5 TABLET ORAL EVERY 6 HOURS PRN
Status: CANCELLED | OUTPATIENT
Start: 2024-01-04

## 2024-01-04 RX ORDER — DEXAMETHASONE SODIUM PHOSPHATE 10 MG/ML
INJECTION, SOLUTION INTRAMUSCULAR; INTRAVENOUS AS NEEDED
Status: DISCONTINUED | OUTPATIENT
Start: 2024-01-04 | End: 2024-01-04

## 2024-01-04 RX ORDER — ROCURONIUM BROMIDE 10 MG/ML
INJECTION, SOLUTION INTRAVENOUS AS NEEDED
Status: DISCONTINUED | OUTPATIENT
Start: 2024-01-04 | End: 2024-01-04

## 2024-01-04 RX ORDER — ACETAMINOPHEN 325 MG/1
650 TABLET ORAL EVERY 6 HOURS PRN
Status: CANCELLED | OUTPATIENT
Start: 2024-01-04

## 2024-01-04 RX ORDER — CHLORHEXIDINE GLUCONATE ORAL RINSE 1.2 MG/ML
15 SOLUTION DENTAL ONCE
Status: COMPLETED | OUTPATIENT
Start: 2024-01-04 | End: 2024-01-04

## 2024-01-04 RX ORDER — CEFAZOLIN SODIUM 2 G/50ML
2000 SOLUTION INTRAVENOUS ONCE
Status: COMPLETED | OUTPATIENT
Start: 2024-01-04 | End: 2024-01-04

## 2024-01-04 RX ORDER — ONDANSETRON 2 MG/ML
4 INJECTION INTRAMUSCULAR; INTRAVENOUS EVERY 6 HOURS PRN
Status: CANCELLED | OUTPATIENT
Start: 2024-01-04

## 2024-01-04 RX ORDER — EPHEDRINE SULFATE 50 MG/ML
INJECTION INTRAVENOUS AS NEEDED
Status: DISCONTINUED | OUTPATIENT
Start: 2024-01-04 | End: 2024-01-04

## 2024-01-04 RX ORDER — ONDANSETRON 4 MG/1
4 TABLET, FILM COATED ORAL EVERY 8 HOURS PRN
Qty: 20 TABLET | Refills: 0 | Status: SHIPPED | OUTPATIENT
Start: 2024-01-04

## 2024-01-04 RX ORDER — OXYCODONE HYDROCHLORIDE 5 MG/1
5 TABLET ORAL EVERY 6 HOURS PRN
Qty: 15 TABLET | Refills: 0 | Status: SHIPPED | OUTPATIENT
Start: 2024-01-04 | End: 2024-01-14

## 2024-01-04 RX ORDER — ACETAMINOPHEN 500 MG
1000 TABLET ORAL EVERY 8 HOURS
Qty: 60 TABLET | Refills: 0 | Status: SHIPPED | OUTPATIENT
Start: 2024-01-04

## 2024-01-04 RX ORDER — FENTANYL CITRATE/PF 50 MCG/ML
50 SYRINGE (ML) INJECTION
Status: DISCONTINUED | OUTPATIENT
Start: 2024-01-04 | End: 2024-01-04 | Stop reason: HOSPADM

## 2024-01-04 RX ORDER — MIDAZOLAM HYDROCHLORIDE 2 MG/2ML
INJECTION, SOLUTION INTRAMUSCULAR; INTRAVENOUS
Status: COMPLETED | OUTPATIENT
Start: 2024-01-04 | End: 2024-01-04

## 2024-01-04 RX ORDER — BUPIVACAINE HYDROCHLORIDE 5 MG/ML
INJECTION, SOLUTION EPIDURAL; INTRACAUDAL
Status: COMPLETED | OUTPATIENT
Start: 2024-01-04 | End: 2024-01-04

## 2024-01-04 RX ORDER — SODIUM CHLORIDE, SODIUM LACTATE, POTASSIUM CHLORIDE, CALCIUM CHLORIDE 600; 310; 30; 20 MG/100ML; MG/100ML; MG/100ML; MG/100ML
100 INJECTION, SOLUTION INTRAVENOUS CONTINUOUS
Status: DISCONTINUED | OUTPATIENT
Start: 2024-01-04 | End: 2024-01-04 | Stop reason: HOSPADM

## 2024-01-04 RX ORDER — FENTANYL CITRATE 50 UG/ML
INJECTION, SOLUTION INTRAMUSCULAR; INTRAVENOUS AS NEEDED
Status: DISCONTINUED | OUTPATIENT
Start: 2024-01-04 | End: 2024-01-04

## 2024-01-04 RX ORDER — SODIUM CHLORIDE, SODIUM LACTATE, POTASSIUM CHLORIDE, CALCIUM CHLORIDE 600; 310; 30; 20 MG/100ML; MG/100ML; MG/100ML; MG/100ML
100 INJECTION, SOLUTION INTRAVENOUS CONTINUOUS
Status: CANCELLED | OUTPATIENT
Start: 2024-01-04

## 2024-01-04 RX ORDER — LIDOCAINE HYDROCHLORIDE 10 MG/ML
INJECTION, SOLUTION EPIDURAL; INFILTRATION; INTRACAUDAL; PERINEURAL AS NEEDED
Status: DISCONTINUED | OUTPATIENT
Start: 2024-01-04 | End: 2024-01-04

## 2024-01-04 RX ORDER — MAGNESIUM HYDROXIDE 1200 MG/15ML
LIQUID ORAL AS NEEDED
Status: DISCONTINUED | OUTPATIENT
Start: 2024-01-04 | End: 2024-01-04 | Stop reason: HOSPADM

## 2024-01-04 RX ADMIN — BUPIVACAINE HYDROCHLORIDE 10 ML: 5 INJECTION, SOLUTION EPIDURAL; INTRACAUDAL; PERINEURAL at 07:20

## 2024-01-04 RX ADMIN — DEXAMETHASONE SODIUM PHOSPHATE 10 MG: 10 INJECTION, SOLUTION INTRAMUSCULAR; INTRAVENOUS at 07:49

## 2024-01-04 RX ADMIN — FENTANYL CITRATE 50 MCG: 50 INJECTION, SOLUTION INTRAMUSCULAR; INTRAVENOUS at 07:17

## 2024-01-04 RX ADMIN — MIDAZOLAM 2 MG: 1 INJECTION INTRAMUSCULAR; INTRAVENOUS at 07:17

## 2024-01-04 RX ADMIN — BUPIVACAINE 10 ML: 13.3 INJECTION, SUSPENSION, LIPOSOMAL INFILTRATION at 07:20

## 2024-01-04 RX ADMIN — LIDOCAINE HYDROCHLORIDE 50 MG: 10 INJECTION, SOLUTION EPIDURAL; INFILTRATION; INTRACAUDAL; PERINEURAL at 07:39

## 2024-01-04 RX ADMIN — ROCURONIUM BROMIDE 20 MG: 10 INJECTION, SOLUTION INTRAVENOUS at 08:51

## 2024-01-04 RX ADMIN — EPHEDRINE SULFATE 10 MG: 50 INJECTION, SOLUTION INTRAVENOUS at 08:32

## 2024-01-04 RX ADMIN — CEFAZOLIN SODIUM 2000 MG: 2 SOLUTION INTRAVENOUS at 07:32

## 2024-01-04 RX ADMIN — FENTANYL CITRATE 50 MCG: 50 INJECTION, SOLUTION INTRAMUSCULAR; INTRAVENOUS at 07:39

## 2024-01-04 RX ADMIN — CHLORHEXIDINE GLUCONATE 15 ML: 1.2 SOLUTION ORAL at 06:38

## 2024-01-04 RX ADMIN — SODIUM CHLORIDE, SODIUM LACTATE, POTASSIUM CHLORIDE, AND CALCIUM CHLORIDE: .6; .31; .03; .02 INJECTION, SOLUTION INTRAVENOUS at 09:38

## 2024-01-04 RX ADMIN — ROCURONIUM BROMIDE 50 MG: 10 INJECTION, SOLUTION INTRAVENOUS at 07:40

## 2024-01-04 RX ADMIN — PROPOFOL 200 MG: 10 INJECTION, EMULSION INTRAVENOUS at 07:39

## 2024-01-04 RX ADMIN — EPHEDRINE SULFATE 10 MG: 50 INJECTION, SOLUTION INTRAVENOUS at 07:59

## 2024-01-04 RX ADMIN — ONDANSETRON 4 MG: 2 INJECTION INTRAMUSCULAR; INTRAVENOUS at 07:49

## 2024-01-04 RX ADMIN — SODIUM CHLORIDE, SODIUM LACTATE, POTASSIUM CHLORIDE, AND CALCIUM CHLORIDE 100 ML/HR: .6; .31; .03; .02 INJECTION, SOLUTION INTRAVENOUS at 06:38

## 2024-01-04 RX ADMIN — SUGAMMADEX 200 MG: 100 INJECTION, SOLUTION INTRAVENOUS at 09:36

## 2024-01-04 NOTE — DISCHARGE INSTR - AVS FIRST PAGE
POSTOPERATIVE INSTRUCTIONS following SHOULDER SURGERY    MEDICATIONS:  Resume all home medications unless otherwise instructed by your surgeon.  Pain Medication:   Take Tylenol and Naproxen on prescribed schedule for 7 days  Take Oxycodone as needed for severe pain   If you were given a regional anesthetic (nerve block), it is helpful to take your pain medication before the block wear off.    Possible side effects include nausea, constipation, and urinary retention.  If you experience these side effects, please call our office for assistance.  Pain med refills are authorized only during office hours (8am-4pm, Mon-Fri).  Nausea Medication:   Zofran 4mg, take 1 tablet every 6 hours as needed for nausea or vomiting   Fill prescription ONLY if you expericnce severe nausea.    WOUND CARE:  Keep the dressing clean and dry.  Light drainage may occur the first 2 days postop.  You may remove the dressings and get the incision wet in the shower 72 hours after surgery.  DO NOT remove steri-strips or sutures.  DO NOT immerse the incision under water.  Carefully pat the incision dry.  If there is wound drainage, re-apply a fresh dry gauze dressing.  Please call our office ([phone]) if you experience either of the following:  Sudden increase in swelling, redness, or warmth at the surgical site  Excessive incisional drainage that persists beyond the 3rd day after surgery  Oral temperature greater than 101 degrees, not relieved with Tylenol  Shortness of breath, chest pain, nausea, or any other concerning symptoms    ICE:  You may use Ice to help with pain control   Place ice on the operative site for 20 minutes at a time when you are in pain     SLING:  Wear your sling AT ALL TIMES (including sleep) until your first postoperative office visit.  You may remove the sling for showering but must keep your arm at your side.    ACTIVITY:   DO NOT lift, carry, push, or pull anything with your operative arm.  Shoulder:  DO NOT  actively (on your own) raise your operative arm away from the side of you body or rotate it out away from your body unless instructed by your surgeon or physical therapist.  Place a pillow behind the elbow while lying down.  Sleeping in a more upright position (recliner) may be more comfortable initially.  Elbow, Wrist, and Finger Motion:  You may take your elbow out of the slight carefully to move your elbow, wrist, and fingers. Follow your motion precautions for the shoulder. Replace the sling immediately after.     PHYSICAL THERAPY:  Begin therapy 5 TO 7 DAYS AFTER SURGERY.  You were given a prescription for therapy at your preoperative office visit or on the day of surgery.  If you do not have physical therapy scheduled yet, please call our office for assistance.    FOLLOW-UP APPOINTMENT:  10-14 days after surgery with:      Dr. Jared Keys MD    [facility] [address]  [place] [address]    [facility] [address] [address]    Phone:   [phone]

## 2024-01-04 NOTE — PERIOPERATIVE NURSING NOTE
Complete discharge instructions reviewed with daughter and patient, verbalized understanding.  Instructed on use of splint and ice.  Circulation checks intact.

## 2024-01-04 NOTE — PERIOPERATIVE NURSING NOTE
1020 Daughter at bedside, ice to right shoulder.  Sipping on gingerale, right fingers warm, mobile pink nails, splint in place.

## 2024-01-04 NOTE — ANESTHESIA POSTPROCEDURE EVALUATION
Post-Op Assessment Note    CV Status:  Stable  Pain Score: 0    Pain management: adequate       Mental Status:  Arousable and sleepy   Hydration Status:  Stable   PONV Controlled:  None   Airway Patency:  Patent     Post Op Vitals Reviewed: Yes      Staff: CRNA   Comments: spontaneously breathing, HOB @ 30 degrees, protecting airway, simple mask to O2, fullyendorsed to recovery w/o AC              BP   126/64   Temp      Pulse  72   Resp   14   SpO2   99

## 2024-01-04 NOTE — OP NOTE
OPERATIVE REPORT  PATIENT NAME: Dominique Varela    :  1950  MRN: [id number]  Pt Location: [facility] OR ROOM 03    SURGERY DATE: 2024    Surgeons and Role:     * Everette Keys MD - Primary     * Karolyn Valdovinos PA-C - Assisting    The presence of Karolyn Valdovinos PA-C was required for poisitioning, retraction, assistance, and closure. No qualified resident was available.    Preop Diagnosis:  Nontraumatic complete tear of right rotator cuff [M75.121]  Biceps tendinitis of right shoulder [M75.21]    Preop Diagnosis:  Nontraumatic complete tear of right rotator cuff [M75.121]  Biceps tendinitis of right shoulder [M75.21]    Procedure(s):  Right - Right shoulder arthroscopic rotator cuff repair, biceps tenotomy, Extensive Debridement    Specimen(s):  * No specimens in log *    Estimated Blood Loss:   Minimal    Drains:  * No LDAs found *    Anesthesia Type:   General w/ Interscalene Block    Operative Indications:  73 y.o. female with a full thickness, atraumatic tear of the right rotator cuff that affected their quality of life including ability to perform ADLs, work duties, and recreational activities. The patients symptoms and function did not significantly improve with nonoperative treatment options including physical therapy.  She also has significant biceps tendinosis and tenderness in the bicipital groove as well as instability of the biceps.  Risks, benefits, and alternatives to surgery were discussed. The patient expressed understanding and elected to proceed with surgery.  I recommended right shoulder arthroscopy with rotator cuff repair and biceps tenotomy.  She expressed understanding elected proceed and signed written consent.    Procedure in detail:  The patient was greeted in the preoperative holding area, where history, physical exam, review of surgical plan, and operative site perlita were performed. The patient was transferred to the operative suite, placed supine on the operative table.  General  endotracheal anesthesia was induced, and perioperative intravenous antibiotics were administered.  The patient was safely repositioned to the beach-chair position with cervical spine in neutral position. All bony prominences were well padded. SCDs were placed on bilateral lower extremities.      Exam under anesthesia was then performed which demonstrated well-maintained range of motion symmetric to the contralateral side.  No evidence of instability with anterior posterior load-and-shift.        The operative shoulder was prepped and draped in usual sterile fashion. A time out was performed with the participation of the entire operative and anesthesia team. Standard posterior glenohumeral and anterior rotator interval portals were established, through which the scope and probe were inserted respectively, which aided and assisted in performance of a complete and thorough diagnostic arthroscopy, which demonstrated the following findings:    Operative Findings:  Humeral head: Grade 1 and 2 articular cartilage changes  Glenoid: Grade 1 and 2 articular cartilage changes  Biceps tendon and biceps anchor: Significant tearing and fraying at the biceps anchor  Rotator Cuff: Full-thickness tear of the supraspinatus, upper border tear of the subscapularis  Glenoid Labrum showed degenerative fraying without focal tearing  Subacromial showed mild bursitis and visualized full-thickness tear    At this point,  using an arthroscopic biter, I performed a biceps tenotomy. I then performed an extensive glenohumeral debridement of  frayed labral tissue,  hypertrophic synovium, edge of the torn rotator cuff, and the residual bicep stump. I then turned my attention to the subscapularis repair.      I then performed a subscapularis repair.  The subscapularis tear involve the upper border the inferior aspect was intact. An anterolateral portal was created to assist with suture passage through the tendon and repair.  I placed a canula  through the anterolateral portal. Tissue within the rotator interval was debrided and the subscapularis tendon was mobilized.   The tendon was easily reducible to the lesser tuberosity footprint.  I passed two fiber link suture through the tendon to create a luggage tag at the superior border.  Traction on the sutures provided excellent reduction of the tendon.  I passed the luggage tag suture through a swivel lock anchor.  A metal punch was used to create a  hole in the superior bone of the lesser tuberosity.  The swivel lock anchor was then placed into the hole, tension was pulled on the suture.  And the anchor was fixed into the lesser tuberosity.  Movement of the humeral head after the repair showed excellent reduction of the tendon.The tendon was also stable to evaluation with the probe. All debris was removed from the glenohumeral joint.      I then placed the arthroscope into the subacromial space. After localization with a spinal needle, I made a lateral incision to establish a lateral portal.  I performed an extensive bursectomy , removed the soft tissue from the undersurface of the acromion.      After bursectomy was completed evaluation of the rotator cuff again showed full thickness tear of the supraspinatus.  A posterolateral viewing portal was created and the scope was moved to the portal.  The tendon was evaluated and had excellent  mobility and tissue quality was adequate to perform a repair.  Tissue was easily reducible to the footprint on the greater tuberosity.  Using a shaver I created a bleeding bed at the greater tuberosity.      I placed a cannula through the lateral incision and utilized this portal to placed anchors in the greater tuberosity.  2 Medial Row all suture arthrex  anchors were placed immediately lateral to the articular margin on the greater tuberosity foot print. Sutures were shuttled separately out the front and back portals respectively.  Using a scorpion suture passer  the sutures from the posterior anchor were passed through the posterior portion of the supraspinatus just lateral to the musculotendinous  junction.  Then the anterior sutures were passed through the anterior margin of the tear just lateral to the musculotendinous junction.  Traction on the sutures showed good tendon mobility and confirmed ability to reduce the tendon to the greater tuberosity foot print.     In order to complete a knotless double row repair, the lateral aspect of the tuberosity was debrided of soft tissue using the radiofrequency ablator. Through the lateral cannula the punch was placed and tapped into position.  One Suture  from each medial row anchor was passed through a swivel lock anchor. The anchor was then placed through the cannulae and into the previously punched hole.  Once anchor was in the hole the sutures were tightened.  With the help of a mallet the anchor was tapped down to the bone. the anchor was screwed into place.  Next the suture cutter was used to cut the excess suture.   This process was repeated for a second lateral row anchor roughly 2 cm anterior to the first.  The rotator cuff repair was then evaluated with a probe and the scope and felt to have excellent reduction onto the greater tuberosity.  The repair was stable during humeral motion and with evaluation with the probe.      The shoulder was copiously irrigated and drained. The arthroscopic wounds were closed with 3-0 nylon. A sterile dressing and shoulder immobilizer were placed on the operative extremity.  The patient emerged from anesthesia and was taken to the recovery room in stable condition.  There were no apparent complications.    SIGNATURE: Everette Keys MD  DATE: January 4, 2024  TIME: 9:45 AM

## 2024-01-04 NOTE — H&P
H&P reviewed. After examining the patient I find the following changes since the H&P had been written:    She is not tender at the AC joint today. She does not localize pain there. We will not perform a distal clavicle excision. Plan to proceed with rotator cuff repair and biceps tenotomy.         Vitals:    01/04/24 0633   BP: 156/99   Pulse: 83   Resp: 18   Temp: 98 °F (36.7 °C)   SpO2: 97%     Orthopedic Sports Medicine Follow Up Patient Visit     Assesment:   73 y.o. female with right atraumatic rotator cuff tear, biceps tendinitis, and mild osteoarthritis of AC joint    Plan:    We had a long discussion regarding the diagnosis and treatment plan. We discussed options for operative and nonoperative treatment. Specifically, nonoperative treatment options would include continued PT, steroid injections, OTC medications as needed, and activity modifications. Because the patient has persistent shoulder pain in the setting of a rotator cuff tear noted on MRI and attempting various non-operative interventions without symptomatic relief, I recommended surgical intervention with a right rotator cuff repair and biceps tenotomy. The patient does have mild AC joint tenderness today, but does not typically localize pain here or feel this limits her. We will reevaluate the AC joint on the day of surgery for consideration of distal clavicle excision, but will not consent for this today. We had a detailed discussion of the risks, benefits, and alternatives to this procedure. The risks include but are not limited to infection, bleeding, stiffness, loss of range of motion, blood clot, failure of surgery, fracture, risk of potential future arthritis, swelling, injury to surrounding structures/nerve/artery/vein, failure of medical implants or surgical instruments, retained hardware and/or foreign body, and continued pain/dysfunction/disability. We discussed the expected timeline for recovery including the timeline for return to  work and sporting activities. The patient expressed good understanding and elected to proceed. They will meet be scheduled at a mutually convenient time in the near future.     I recommended the patient see her PCP for pre-op clearance and appropriate lab work as well as an EKG. The patient is a non-smoker and denies history of cardiac disease, pulmonary disease, and diabetes.     The patient was fit for a post-op sling for her to bring with her on the day of surgery.    We will plan to start physical therapy per protocol provided on the day of surgery.     Follow up 10-14 days after surgery.         No chief complaint on file.      History of Present Illness:    The patient is a 73 y.o., left hand dominant, female, presenting for follow up evaluation of atraumatic right shoulder pain and MRI review. Today, the patient notes 10/10 pain localized to the anterior and lateral aspects of the shoulder. She states pain is exacerbated following PT sessions. Dominique denies weakness, limited range of motion, new injury or trauma, neck pain, and numbness/tingling. She is able to complete ADLs, but pushes through pain to do so. The patient did complete a consistent course of PT. She has not received a steroid injection into the shoulder so far. The patient is interested in discussing surgical options today.    Affected shoulder SANE score: 85    Shoulder Surgical History:  None    Past Medical, Social and Family History:  Past Medical History:   Diagnosis Date    Breast cyst     GERD (gastroesophageal reflux disease)     tums only    Macular degeneration     left eye     Past Surgical History:   Procedure Laterality Date    BREAST CYST EXCISION Right 1971    2 rt breast cysts removed     SECTION      and     CHOLECYSTECTOMY      COLONOSCOPY       No Known Allergies  No current facility-administered medications on file prior to encounter.     Current Outpatient Medications on File Prior to Encounter    Medication Sig Dispense Refill    Coenzyme Q10 (CoQ10) 50 MG CAPS Take by mouth      glucosamine-chondroitin 500-400 MG tablet Take 1 tablet by mouth 3 (three) times a day      Lutein 40 MG CAPS Take by mouth      Multiple Vitamins-Minerals (MULTIVITAMIN WITH MINERALS) tablet Take 1 tablet by mouth daily       Social History     Socioeconomic History    Marital status:      Spouse name: Not on file    Number of children: Not on file    Years of education: Not on file    Highest education level: Not on file   Occupational History    Not on file   Tobacco Use    Smoking status: Never     Passive exposure: Never    Smokeless tobacco: Never   Vaping Use    Vaping status: Never Used   Substance and Sexual Activity    Alcohol use: No    Drug use: No    Sexual activity: Not on file   Other Topics Concern    Not on file   Social History Narrative    Not on file     Social Determinants of Health     Financial Resource Strain: Not on file   Food Insecurity: Not on file   Transportation Needs: Not on file   Physical Activity: Not on file   Stress: Not on file   Social Connections: Not on file   Intimate Partner Violence: Not on file   Housing Stability: Not on file         I have reviewed the past medical, surgical, social and family history, medications and allergies as documented in the EMR.    Review of systems: ROS is negative other than that noted in the HPI.  Constitutional: Negative for fatigue and fever.   HENT: Negative for sore throat.    Respiratory: Negative for shortness of breath.    Cardiovascular: Negative for chest pain.   Gastrointestinal: Negative for abdominal pain.   Endocrine: Negative for cold intolerance and heat intolerance.   Genitourinary: Negative for flank pain.   Musculoskeletal: Negative for back pain.   Skin: Negative for rash.   Allergic/Immunologic: Negative for immunocompromised state.   Neurological: Negative for dizziness.   Psychiatric/Behavioral: Negative for agitation.       "Physical Exam:    Blood pressure 156/99, pulse 83, temperature 98 °F (36.7 °C), temperature source Temporal, resp. rate 18, height 5' 3\" (1.6 m), weight 98.9 kg (218 lb 0.6 oz), SpO2 97%, not currently breastfeeding.    General/Constitutional: NAD, well developed, well nourished  HENT: Normocephalic, atraumatic  CV: Intact distal pulses, regular rate  Resp: No respiratory distress or labored breathing  Abdomen: soft, nondistended, non tender   Lymphatic: No lymphadenopathy palpated  Neuro: Alert and Oriented x 3, no focal deficits  Psych: Normal mood, normal affect  Skin: Warm, dry, no rashes, no erythema      Shoulder Exam:      Inspection: No ecchymosis, edema, or deformity. No visualized wounds or skin lesions   Palpation: mild AC joint tenderness. Moderate bicipital groove tenderness  Active Motion:       FF: 160, symmetric        ER: 60 compared to 70 contralaterally        IR: T12, symmetric  Strength: 4+/5 empty can, 4+/5 ER,  5/5 IR   Sensory - SILT in the Radial / Ulnar / Median / Axillary nerve distributions  Motor - AIN / PIN / Radial / Ulnar / Median / Axillary motor nerves in tact  Palpable Radial pulse  Cap refill <2secs in all digits      Imaging    I reviewed and interpreted MRI of the right shoulder which shows IMPRESSION:     1. Complete (full-thickness and full width) supraspinatus insertional tear, with 1.4 cm tendon retraction. No muscle atrophy.     2. Mild infraspinatus tendinosis with low-grade interstitial tear.     3. Subscapularis tendinosis with low-grade interstitial tear resulting in medial subluxation of long head of biceps which is perched over the lesser tuberosity.     4. Medially subluxed long head of biceps with moderate tendinosis.    "

## 2024-01-04 NOTE — ANESTHESIA PROCEDURE NOTES
Peripheral Block    Patient location during procedure: holding area  Start time: 1/4/2024 7:20 AM  Reason for block: at surgeon's request and post-op pain management  Staffing  Performed by: Jaylon Lou MD  Authorized by: Jaylon Lou MD    Preanesthetic Checklist  Completed: patient identified, IV checked, site marked, risks and benefits discussed, surgical consent, monitors and equipment checked, pre-op evaluation and timeout performed  Peripheral Block  Prep: ChloraPrep  Patient monitoring: frequent blood pressure checks, continuous pulse oximetry and heart rate  Block type: Interscalene  Laterality: right  Injection technique: single-shot  Procedures: ultrasound guided, Ultrasound guidance required for the procedure to increase accuracy and safety of medication placement and decrease risk of complications.  Ultrasound permanent image savedbupivacaine (PF) (MARCAINE) 0.5 % injection 20 mL - Perineural   10 mL - 1/4/2024 7:20:00 AM  bupivacaine liposomal (EXPAREL) 1.3 % injection 20 mL - Perineural   10 mL - 1/4/2024 7:20:00 AM  midazolam (VERSED) injection 0.5 mg - Intravenous   2 mg - 1/4/2024 7:17:00 AM  fentanyl citrate (PF) 100 MCG/2ML 50 mcg - Intravenous   50 mcg - 1/4/2024 7:17:00 AM  Needle  Needle type: Stimuplex   Needle gauge: 20 G  Needle length: 4 in  Needle localization: anatomical landmarks and ultrasound guidance  Needle insertion depth: 4 cm  Assessment  Injection assessment: incremental injection, frequent aspiration, injected with ease, negative aspiration, negative for heart rate change, no paresthesia on injection, no symptoms of intraneural/intravenous injection and needle tip visualized at all times  Paresthesia pain: none  Post-procedure:  site cleaned  patient tolerated the procedure well with no immediate complications

## 2024-01-08 ENCOUNTER — TELEPHONE (OUTPATIENT)
Dept: OBGYN CLINIC | Facility: MEDICAL CENTER | Age: 74
End: 2024-01-08

## 2024-01-08 NOTE — TELEPHONE ENCOUNTER
Caller: Dominique    Doctor: Dr Keys     Reason for call: The patient is calling and asking if she starts PT on January 15 that is okay with you.  Her surgery :   Right shoulder arthroscopic rotator cuff repair, biceps tenotomy,    Was January 4th.    She is just checking with you that 1/15 is okay to begin PT due to having the therapist she prefers  Please advise. Thank you     Call back#: [phone]

## 2024-01-15 ENCOUNTER — OFFICE VISIT (OUTPATIENT)
Dept: OBGYN CLINIC | Facility: CLINIC | Age: 74
End: 2024-01-15

## 2024-01-15 ENCOUNTER — EVALUATION (OUTPATIENT)
Dept: PHYSICAL THERAPY | Facility: CLINIC | Age: 74
End: 2024-01-15
Payer: MEDICARE

## 2024-01-15 VITALS
HEIGHT: 63 IN | DIASTOLIC BLOOD PRESSURE: 83 MMHG | SYSTOLIC BLOOD PRESSURE: 157 MMHG | WEIGHT: 218 LBS | BODY MASS INDEX: 38.62 KG/M2 | HEART RATE: 72 BPM

## 2024-01-15 DIAGNOSIS — M75.21 BICEPS TENDINITIS OF RIGHT SHOULDER: ICD-10-CM

## 2024-01-15 DIAGNOSIS — M75.121 NONTRAUMATIC COMPLETE TEAR OF RIGHT ROTATOR CUFF: Primary | ICD-10-CM

## 2024-01-15 DIAGNOSIS — Z98.890 S/P ARTHROSCOPY OF RIGHT SHOULDER: ICD-10-CM

## 2024-01-15 DIAGNOSIS — M75.121 NONTRAUMATIC COMPLETE TEAR OF RIGHT ROTATOR CUFF: ICD-10-CM

## 2024-01-15 DIAGNOSIS — Z98.890 S/P RIGHT ROTATOR CUFF REPAIR: ICD-10-CM

## 2024-01-15 PROCEDURE — 97110 THERAPEUTIC EXERCISES: CPT

## 2024-01-15 PROCEDURE — 97161 PT EVAL LOW COMPLEX 20 MIN: CPT

## 2024-01-15 PROCEDURE — 99024 POSTOP FOLLOW-UP VISIT: CPT | Performed by: ORTHOPAEDIC SURGERY

## 2024-01-15 NOTE — PROGRESS NOTES
Assessment/Plan:  1. Nontraumatic complete tear of right rotator cuff        2. Biceps tendinitis of right shoulder        3. S/P arthroscopy of right shoulder          Scribe Attestation      I,:  Nixon Pettit am acting as a scribe while in the presence of the attending physician.:       I,:  Everette Keys MD personally performed the services described in this documentation    as scribed in my presence.:               Dominique is doing well.  Her sutures were removed today without incident.  Her surgical incisions are healing well and are benign in appearance.  We reviewed her intraoperative pictures together and discussed the details of her procedure.   Dominique will remain in her sling for a total of 6 weeks.  She should avoid independent movement of the right shoulder unless under the guidance of her physical therapist.  She will attend therapy for the next 4 weeks and return to see me.  Dominique had no further questions.    Subjective:   Dominique Varela is a 73 y.o. female who presents postop evaluation of right shoulder arthroscopy with biceps tenotomy, supraspinatus and subscapularis repair performed 11 days ago.  Dominique states she is doing well.  She has no complaints of excessive pain only mild soreness into the right trapezius region.  She denies radiating pain or distal paresthesias. She has been compliant with the use of her sling. She does not complain of problems with sleep or appetite.   She will attend her inaugural physical therapy visit today.      Review of Systems   Constitutional:  Negative for chills, fever and unexpected weight change.   HENT:  Negative for hearing loss, nosebleeds and sore throat.    Eyes:  Negative for pain, redness and visual disturbance.   Respiratory:  Negative for cough, shortness of breath and wheezing.    Cardiovascular:  Negative for chest pain, palpitations and leg swelling.   Gastrointestinal:  Negative for abdominal pain, nausea and vomiting.   Endocrine: Negative  for polydipsia and polyuria.   Genitourinary:  Negative for dysuria and hematuria.   Musculoskeletal:         See HPI   Skin:  Negative for rash and wound.   Neurological:  Negative for dizziness, numbness and headaches.   Psychiatric/Behavioral:  Negative for decreased concentration and suicidal ideas. The patient is not nervous/anxious.          Past Medical History:   Diagnosis Date    Breast cyst     GERD (gastroesophageal reflux disease)     tums only    Macular degeneration     left eye       Past Surgical History:   Procedure Laterality Date    BREAST CYST EXCISION Right 1971    2 rt breast cysts removed     SECTION      and     CHOLECYSTECTOMY      COLONOSCOPY  2016    ME SURGICAL ARTHROSCOPY SHOULDER LMTD DBRDMT  Right 2024    Procedure: Right shoulder arthroscopic rotator cuff repair, biceps tenotomy, Extensive Debridement;  Surgeon: Everette Keys MD;  Location: [facility];  Service: Orthopedics       Family History   Problem Relation Age of Onset    Heart disease Father     Breast cancer Other         unkown age    No Known Problems Sister     No Known Problems Daughter     No Known Problems Maternal Grandmother     No Known Problems Paternal Grandmother     No Known Problems Maternal Aunt     No Known Problems Maternal Aunt     No Known Problems Paternal Aunt        Social History     Occupational History    Not on file   Tobacco Use    Smoking status: Never     Passive exposure: Never    Smokeless tobacco: Never   Vaping Use    Vaping status: Never Used   Substance and Sexual Activity    Alcohol use: No    Drug use: No    Sexual activity: Not on file         Current Outpatient Medications:     acetaminophen (TYLENOL) 500 mg tablet, Take 2 tablets (1,000 mg total) by mouth every 8 (eight) hours, Disp: 60 tablet, Rfl: 0    Coenzyme Q10 (CoQ10) 50 MG CAPS, Take by mouth, Disp: , Rfl:     Lutein 40 MG CAPS, Take by mouth, Disp: , Rfl:     Multiple Vitamins-Minerals  (MULTIVITAMIN WITH MINERALS) tablet, Take 1 tablet by mouth daily, Disp: , Rfl:     glucosamine-chondroitin 500-400 MG tablet, Take 1 tablet by mouth 3 (three) times a day (Patient not taking: Reported on 1/15/2024), Disp: , Rfl:     naproxen (Naprosyn) 500 mg tablet, Take 1 tablet (500 mg total) by mouth 2 (two) times a day with meals (Patient not taking: Reported on 1/15/2024), Disp: 20 tablet, Rfl: 0    ondansetron (ZOFRAN) 4 mg tablet, Take 1 tablet (4 mg total) by mouth every 8 (eight) hours as needed for nausea or vomiting (Patient not taking: Reported on 1/15/2024), Disp: 20 tablet, Rfl: 0    No Known Allergies    Objective:  Vitals:    01/15/24 1152   BP: 157/83   Pulse: 72       Ortho Exam  Inspection of the right shoulder found the skin to be of normal color and temperature.  The surgical incisions are healing nicely with no evidence of infection.  The upper extremity is neurovascular intact with normal sensations to light touch and 2+ radial pulse.  She has normal motor function about the hand intrinsics.  Physical Exam  Vitals reviewed.   Constitutional:       Appearance: She is well-developed.   HENT:      Head: Normocephalic and atraumatic.   Eyes:      General:         Right eye: No discharge.         Left eye: No discharge.      Conjunctiva/sclera: Conjunctivae normal.   Cardiovascular:      Rate and Rhythm: Regular rhythm.   Pulmonary:      Effort: Pulmonary effort is normal. No respiratory distress.      Breath sounds: No stridor.   Musculoskeletal:      Cervical back: Normal range of motion and neck supple.      Comments: As noted in the HPI.   Skin:     General: Skin is warm and dry.   Neurological:      Mental Status: She is alert and oriented to person, place, and time.   Psychiatric:         Behavior: Behavior normal.               This document was created using speech voice recognition software.   Grammatical errors, random word insertions, pronoun errors, and incomplete sentences are an  occasional consequence of this system due to software limitations, ambient noise, and hardware issues.   Any formal questions or concerns about content, text, or information contained within the body of this dictation should be directly addressed to the provider for clarification.

## 2024-01-15 NOTE — PROGRESS NOTES
PT Evaluation     Today's date: 1/15/2024  Patient name: Dominique Varela  : 1950  MRN: [id number]  Referring provider: Karolyn Valdovinos PA-C  Dx:   Encounter Diagnosis     ICD-10-CM    1. Nontraumatic complete tear of right rotator cuff  M75.121 Ambulatory referral to Physical Therapy      2. S/P right rotator cuff repair  Z98.890 Ambulatory referral to Physical Therapy                     Assessment  Assessment details: Dominique Varela is a 73 y.o. female who presents to hospital-based outpatient PT s/p right rotator cuff repair with biceps tenotomy, which occurred on 24. Patient presents with the following impairments: right shoulder pain, limited right shoulder PROM, limited right UE strength, and postural dysfunction. Due to these impairments, patient has difficulty performing the following: ADL's, lifting/carrying, reaching overhead, self-care activities, bed mobility, sidelying right, putting on/taking off shoes/socks, household chores, shopping, and sleeping. Patient has been educated in post-op contraindications / precautions, home exercise program, and plan of care. Patient would benefit from skilled physical therapy services to address the above functional limitations and progress towards prior level of function and independence with home exercise program.  Impairments: abnormal muscle firing, abnormal or restricted ROM, activity intolerance, impaired physical strength, lacks appropriate home exercise program, pain with function, scapular dyskinesis, weight-bearing intolerance, poor posture  and poor body mechanics  Understanding of Dx/Px/POC: good   Prognosis: good    Goals  Short Term Goals [3 weeks; target date: 2/15/24]  1. Patient will be independent with initial HEP.  2. Patient will demonstrate an increase in right shoulder flexion PROM to 130°.     Long Term Goals [6 weeks; target date: 4/15/24]  1. Patient will be independent with comprehensive HEP.    2. Patient will demonstrate an  increase in right shoulder AROM to WNL in order to promote bathing/dressing without pain.  3. Patient will demonstrate an increase in right shoulder strength to at least 4/5 on MMT in order to promote pain-free carrying/lifting.  4. Patient will be able to place a 2 lb. weight on a shelf above shoulder height with proper scapulohumeral mechanics.   5. Patient will be able to perform ADLs with right shoulder pain of 2/10 at worst.     Plan  Plan details: Insurance verification form was not printed until after the visit, however patient reported no insurance changes since last calendar year.   Patient would benefit from: skilled physical therapy  Planned modality interventions: cryotherapy, electrical stimulation/Russian stimulation, TENS, ultrasound, high voltage pulsed current: pain management, thermotherapy: hydrocollator packs, unattended electrical stimulation and high voltage pulsed current: spasm management  Planned therapy interventions: flexibility, functional ROM exercises, graded exercise, home exercise program, joint mobilization, manual therapy, neuromuscular re-education, patient education, strengthening, stretching, therapeutic exercise, therapeutic activities, activity modification, postural training, body mechanics training, graded activity, self care, muscle pump exercises, abdominal trunk stabilization, ADL retraining, ADL training, IADL retraining, breathing training, behavior modification, therapeutic training, transfer training and Hernandez taping  Frequency: 2x week  Duration in weeks: 12  Plan of Care beginning date: 1/15/2024  Plan of Care expiration date: 4/15/2024  Treatment plan discussed with: patient      Subjective Evaluation    History of Present Illness  Date of surgery: 1/4/2024  Mechanism of injury: (Information regarding KIMBERLY was taken from evaluation at this facility on 10/16/23.)     Pt reports R shoulder pain over the past 3 weeks, which she attributes to moving and pushing boxes  around her home as she is trying to get rid of things around her home. Pt states that while pushing with her arm to get up from her sectional couch, she has pain in the shoulder. Pt states that reaching back will sometimes cause popping and mild pain. Pt denies difficulty with getting dressed, bathing, or getting ready. Pt states she had an x-ray taken when she saw orthopedics, which was normal. Pt was referred to PT.     (1/15/24) Pt attended PT at this facility from 2023 - 2023 and stopped to pursue further testing with orthopedics. An MRI in 2023 revealed supraspinatus tear, low-grade infraspinatus and subscapularis tear, and medially subluxed biceps long head tendon as per MRI report. Pt elected to undergo RTC repair surgery on 24 and is doing well overall. Pt states that she has been able to shower with the help of her daughter. Pt states that resting her arm at her side while showering makes the shoulder feel achy, but otherwise pain has been improving overall.   Patient Goals  Patient goal: To get back to normal  Pain  Current pain ratin  At best pain ratin  At worst pain ratin  Location: R shoulder  Quality: dull ache  Relieving factors: ice and medications  Exacerbated by: Resting with arm down.  Progression: improved    Social Support  Lives with: Son; daughter is available to come 2x/week to help with bathing.    Employment status: not working  Hand dominance: left  Exercise history: Walking 2x/week      Diagnostic Tests  MRI studies: abnormal  Treatments  Previous treatment: physical therapy      Objective     Postural Observations    Additional Postural Observation Details  Slight forward head, rounded shoulder posture     Observations     Additional Observation Details  Wearing right ultrasling    Passive Range of Motion     Right Shoulder   Flexion: 80 degrees with pain  Abduction: 45 degrees with pain  External rotation 0°: 0 degrees with pain  Internal  rotation 0°: WFL    Additional Passive Range of Motion Details  PROM only during current phase of protocol     Strength/Myotome Testing     Additional Strength Details  Strength testing deferred due to recent surgery            Insurance:  AMA/CMS Eval/ Re-eval POC expires FOTO Auth Status Co-Insurance   CMS - [facility] 1/15/24 4/15/24  None req No                                        1.  1/15 2.  3.  4.  5.  6.    7.  8.  9.  10.  11.  12.    13.  14.  15.  16.  17.  18.    19.  20. 21. 22. 23. 24.    25.  26. 27. 28. 29. 30.   31. 32.  33. 34. 35. 36.        Precautions: s/p R rotator cuff repair w/ biceps tenotomy 1/4/24       EVAL 2 3 4 5 6   Manuals         STM/MFR          Joint mobs                  Neuro Re-Ed         Scap retraction/  protraction 10x        Scap elevation/  depression 10x                                                      Ther Ex         PROM shoulder Flex to ~80-90°, ER to 0°        Pendulums 10x CW, CCW        Elbow flex/ext 5x                                                              Ther Activity         Pt education POC, HEP                                                              Modalities                              Access Code: [email or web address]  URL: [email or web address]  Date: 01/15/2024  Prepared by: Irvin Abdul    Exercises  - Seated Scapular Retraction  - 2-3 x daily - 7 x weekly - 2 sets - 10 reps - 5 hold  - Seated Shoulder Shrugs  - 2-3 x daily - 7 x weekly - 2 sets - 10 reps - 5 hold  - Circular Shoulder Pendulum with Table Support  - 2-3 x daily - 7 x weekly - 1 sets - 30 reps  - Standing Elbow Flexion Extension AROM  - 2-3 x daily - 7 x weekly - 2 sets - 10 reps - 5 hold       Physical Therapy Protocol: Rotator Cuff Repair Immediate Therapy    Based on [facility] Shoulder Protocol    Modalities: performed at the physical therapist's discretion within the guidelines of this protocol.    Wound Care: Keep the dressing clean and dry.  Light drainage may occur the  first 2 days postop.  You may remove the dressings and get the incision wet in the shower 72 hours after surgery.  DO NOT remove steri-strips or sutures.  DO NOT immerse the incision under water.  Carefully pat the incision dry.  If there is wound drainage, re-apply a fresh dry gauze dressing.    Sling: wear the sling at all times, including sleep, except for hygiene for 6 weeks following surgery. Keep the arm by the side during hygiene. The patient may remove the sling, keeping the arm by the side to perform gentle wrist and elbow range of motion. The sling may be removed for PT sessions as described below.    PHASE I (Weeks 0-4): passive range of motion       · Begin passive range of motion within 7 days following surgery. Completed 3 times per week       · Begin scapula exercises with the arm in the sling within 7 days following surgery, including scapular elevation, depression, retraction, and protraction. Completed daily Weeks 0-6       · Begin pendulum exercises at home. Completed 2 times per day    PHASE II (Weeks 4-8): active assisted range of motion       · Week 4: lying active assisted motion            o Using a stick or cane while lying flat, the nonsurgical arm moves the injured arm through different motions, including forward flexion, external rotation, and abduction. Completed daily       · Weeks 5: 45-degree active assisted motion            o Using a stick or cane while lying propped at 45 degrees, the nonsurgical arm moves the injured arm through different motions, including forward flexion, external rotation, and abduction. Completed daily       · Weeks 6-8: upright active assisted motion            o Using a stick or cane while sitting up or standing, the nonsurgical arm moves the injured arm through different motions, including forward flexion, external rotation, and abduction. Completed daily       · Week 6: scapula exercises without the sling, including scapular elevation, depression,  retraction, and protraction. Completed daily    PHASE III (Weeks 8-12): active motion       · AROM: while sitting up or standing, actively forward flex and abduct the shoulders. Completed daily            o Important not to hike the shoulder up towards the ear.       · Isometric exercises: push and pull a folded towel or pillow into a wall. Completed daily, holding push/pull for 15s with 30s rest in between for 10-15 reps            o Completed with elbow flexed at 90 degrees and with shoulder internal/external rotation    PHASE IV (Weeks 12-16): resisted exercise       · Begin rotator cuff strengthening using weights and/or elastic bands. Completed 3 times per week for 3-4 sets of 10-15 reps            o Resisted shoulder internal/external rotation            o Resisted deltoid strengthening            o Resisted scapula strengthening            o AVOID doing full can or empty can exercises       · Begin light shoulder stretching            o Hold each stretch for 15s, then rest 15s. Repeat 5 times            o After Week 16, may use aggressive stretching if needed

## 2024-01-17 ENCOUNTER — OFFICE VISIT (OUTPATIENT)
Dept: PHYSICAL THERAPY | Facility: CLINIC | Age: 74
End: 2024-01-17
Payer: MEDICARE

## 2024-01-17 DIAGNOSIS — Z98.890 S/P RIGHT ROTATOR CUFF REPAIR: ICD-10-CM

## 2024-01-17 DIAGNOSIS — M75.121 NONTRAUMATIC COMPLETE TEAR OF RIGHT ROTATOR CUFF: Primary | ICD-10-CM

## 2024-01-17 PROCEDURE — 97110 THERAPEUTIC EXERCISES: CPT

## 2024-01-17 NOTE — PROGRESS NOTES
Daily Note      Today's date: 2024  Patient name: Dominique Varela  : 1950  MRN: [id number]  Referring provider: Karolyn Valdovinos PA-C  Dx:   Encounter Diagnosis     ICD-10-CM    1. Nontraumatic complete tear of right rotator cuff  M75.121       2. S/P right rotator cuff repair  Z98.890           Subjective: Pt reports soreness of the muscles between the shoulder blades. Pt states she has been performing her HEP.       Objective: See treatment diary below    Assessment: Pt tolerated treatment well.  Pt presents with slight decrease in tightness while performing flexion PROM. Pt was able to achieve slightly more PROM with flexion and ER this visit. Educated pt on the purpose of pendulum exercise as she notes slight pain relief following this exercise. Pt requires assistance to don/doff ultrasling.     Plan: Continue per plan of care.  Progress treatment per protocol.         Insurance:  AMA/CMS Eval/ Re-eval POC expires FOTO Auth Status Co-Insurance   CMS - [facility] 1/15/24 4/15/24  None req No                                        1.  1/15 2.    3.  4.  5.  6.    7.  8.  9.  10.  11.  12.    13.  14.  15.  16.  17.  18.    19.  20. 21. 22. 23. 24.    25.  26. 27. 28. 29. 30.   31. 32.  33. 34. 35. 36.        Precautions: s/p R rotator cuff repair w/ biceps tenotomy 24       EVAL 2 3 4 5 6   Manuals 1/15/24 1/17/24       STM/MFR          Joint mobs                  Neuro Re-Ed         Scap retraction/  protraction 10x 20x       Scap elevation/  depression 10x  20x                                                    Ther Ex         PROM shoulder Flex to ~80-90°, ER to 0° Flex to ~°, ER to 10-15°       Pendulums 10x CW, CCW 30/30       Elbow flex/ext 5x 2x10       Wrist flex/ext  2x10         squeezes   30x red ball                                           Ther Activity         Pt education POC, HEP Reviewed HEP and current phase of protocol                                                               Modalities         CP R shoulder  10' post-tx                    Access Code: [email or web address]  URL: [email or web address]  Date: 01/15/2024  Prepared by: Irvin Abdul    Exercises  - Seated Scapular Retraction  - 2-3 x daily - 7 x weekly - 2 sets - 10 reps - 5 hold  - Seated Shoulder Shrugs  - 2-3 x daily - 7 x weekly - 2 sets - 10 reps - 5 hold  - Circular Shoulder Pendulum with Table Support  - 2-3 x daily - 7 x weekly - 1 sets - 30 reps  - Standing Elbow Flexion Extension AROM  - 2-3 x daily - 7 x weekly - 2 sets - 10 reps - 5 hold       Physical Therapy Protocol: Rotator Cuff Repair Immediate Therapy    Based on Moon Shoulder Protocol    Modalities: performed at the physical therapist's discretion within the guidelines of this protocol.    Wound Care: Keep the dressing clean and dry.  Light drainage may occur the first 2 days postop.  You may remove the dressings and get the incision wet in the shower 72 hours after surgery.  DO NOT remove steri-strips or sutures.  DO NOT immerse the incision under water.  Carefully pat the incision dry.  If there is wound drainage, re-apply a fresh dry gauze dressing.    Sling: wear the sling at all times, including sleep, except for hygiene for 6 weeks following surgery. Keep the arm by the side during hygiene. The patient may remove the sling, keeping the arm by the side to perform gentle wrist and elbow range of motion. The sling may be removed for PT sessions as described below.    PHASE I (Weeks 0-4): passive range of motion       · Begin passive range of motion within 7 days following surgery. Completed 3 times per week       · Begin scapula exercises with the arm in the sling within 7 days following surgery, including scapular elevation, depression, retraction, and protraction. Completed daily Weeks 0-6       · Begin pendulum exercises at home. Completed 2 times per day    PHASE II (Weeks 4-8): active assisted range of motion       · Week 4: lying active  assisted motion            o Using a stick or cane while lying flat, the nonsurgical arm moves the injured arm through different motions, including forward flexion, external rotation, and abduction. Completed daily       · Weeks 5: 45-degree active assisted motion            o Using a stick or cane while lying propped at 45 degrees, the nonsurgical arm moves the injured arm through different motions, including forward flexion, external rotation, and abduction. Completed daily       · Weeks 6-8: upright active assisted motion            o Using a stick or cane while sitting up or standing, the nonsurgical arm moves the injured arm through different motions, including forward flexion, external rotation, and abduction. Completed daily       · Week 6: scapula exercises without the sling, including scapular elevation, depression, retraction, and protraction. Completed daily    PHASE III (Weeks 8-12): active motion       · AROM: while sitting up or standing, actively forward flex and abduct the shoulders. Completed daily            o Important not to hike the shoulder up towards the ear.       · Isometric exercises: push and pull a folded towel or pillow into a wall. Completed daily, holding push/pull for 15s with 30s rest in between for 10-15 reps            o Completed with elbow flexed at 90 degrees and with shoulder internal/external rotation    PHASE IV (Weeks 12-16): resisted exercise       · Begin rotator cuff strengthening using weights and/or elastic bands. Completed 3 times per week for 3-4 sets of 10-15 reps            o Resisted shoulder internal/external rotation            o Resisted deltoid strengthening            o Resisted scapula strengthening            o AVOID doing full can or empty can exercises       · Begin light shoulder stretching            o Hold each stretch for 15s, then rest 15s. Repeat 5 times            o After Week 16, may use aggressive stretching if needed

## 2024-01-23 ENCOUNTER — OFFICE VISIT (OUTPATIENT)
Dept: PHYSICAL THERAPY | Facility: CLINIC | Age: 74
End: 2024-01-23
Payer: MEDICARE

## 2024-01-23 DIAGNOSIS — M75.121 NONTRAUMATIC COMPLETE TEAR OF RIGHT ROTATOR CUFF: Primary | ICD-10-CM

## 2024-01-23 DIAGNOSIS — Z98.890 S/P RIGHT ROTATOR CUFF REPAIR: ICD-10-CM

## 2024-01-23 PROCEDURE — 97110 THERAPEUTIC EXERCISES: CPT

## 2024-01-23 NOTE — PROGRESS NOTES
Daily Note      Today's date: 2024  Patient name: Dominique Varela  : 1950  MRN: [id number]  Referring provider: Karolyn Valdovinos PA-C  Dx:   Encounter Diagnosis     ICD-10-CM    1. Nontraumatic complete tear of right rotator cuff  M75.121       2. S/P right rotator cuff repair  Z98.890           Subjective: Pt reports that her elbow ROM is getting better this week. Pt states she notices improved ability to bend her arm, but it gets tired easily.      Objective: See treatment diary below    Assessment: Pt tolerated treatment well.  Pt demonstrates improvements in R elbow flexion ROM, but fatigues quickly. Pt presents with improvements in R shoulder flexion PROM and introduced to abduction PROM, which was limited by pain. Pt presented with severe tightness into ER initially, which improved to same ROM as last visit by the end.     Plan: Continue per plan of care.         Insurance:  AMA/CMS Eval/ Re-eval POC expires FOTO Auth Status Co-Insurance   CMS - [facility] 1/15/24 4/15/24  None req No                                        1.  1/15 2.    3.    4.  5.  6.    7.  8.  9.  10.  11.  12.    13.  14.  15.  16.  17.  18.    19.  20. 21. 22. 23. 24.    25.  26. 27. 28. 29. 30.   31. 32.  33. 34. 35. 36.        Precautions: s/p R rotator cuff repair w/ biceps tenotomy 24       EVAL 2 3 4 5 6   Manuals 1/15/24 1/17/24 1/23/24      STM/MFR          Joint mobs                  Neuro Re-Ed         Scap retraction/  protraction 10x 20x 20x      Scap elevation/  depression 10x  20x 20x                                                   Ther Ex         PROM shoulder Flex to ~80-90°, ER to 0° Flex to ~°, ER to 10-15° Flex to ~120-130°, ER to 10-15°, abd to ~70°      Pendulums 10x CW, CCW 30/30 30/30      Elbow flex/ext 5x 2x10 2x10      Wrist flex/ext  2x10  2x10        squeezes   30x red ball 30x green ball                                          Ther Activity         Pt education POC, HEP Reviewed HEP  and current phase of protocol  Reviewed protocol                                                             Modalities         CP R shoulder  10' post-tx  10' post-tx                   Access Code: [email or web address]  URL: [email or web address]  Date: 01/15/2024  Prepared by: Irvin Abdul    Exercises  - Seated Scapular Retraction  - 2-3 x daily - 7 x weekly - 2 sets - 10 reps - 5 hold  - Seated Shoulder Shrugs  - 2-3 x daily - 7 x weekly - 2 sets - 10 reps - 5 hold  - Circular Shoulder Pendulum with Table Support  - 2-3 x daily - 7 x weekly - 1 sets - 30 reps  - Standing Elbow Flexion Extension AROM  - 2-3 x daily - 7 x weekly - 2 sets - 10 reps - 5 hold       Physical Therapy Protocol: Rotator Cuff Repair Immediate Therapy    Based on [facility] Shoulder Protocol    Modalities: performed at the physical therapist's discretion within the guidelines of this protocol.    Wound Care: Keep the dressing clean and dry.  Light drainage may occur the first 2 days postop.  You may remove the dressings and get the incision wet in the shower 72 hours after surgery.  DO NOT remove steri-strips or sutures.  DO NOT immerse the incision under water.  Carefully pat the incision dry.  If there is wound drainage, re-apply a fresh dry gauze dressing.    Sling: wear the sling at all times, including sleep, except for hygiene for 6 weeks following surgery. Keep the arm by the side during hygiene. The patient may remove the sling, keeping the arm by the side to perform gentle wrist and elbow range of motion. The sling may be removed for PT sessions as described below.    PHASE I (Weeks 0-4): passive range of motion       · Begin passive range of motion within 7 days following surgery. Completed 3 times per week       · Begin scapula exercises with the arm in the sling within 7 days following surgery, including scapular elevation, depression, retraction, and protraction. Completed daily Weeks 0-6       · Begin pendulum exercises at home.  Completed 2 times per day    PHASE II (Weeks 4-8): active assisted range of motion       · Week 4: lying active assisted motion            o Using a stick or cane while lying flat, the nonsurgical arm moves the injured arm through different motions, including forward flexion, external rotation, and abduction. Completed daily       · Weeks 5: 45-degree active assisted motion            o Using a stick or cane while lying propped at 45 degrees, the nonsurgical arm moves the injured arm through different motions, including forward flexion, external rotation, and abduction. Completed daily       · Weeks 6-8: upright active assisted motion            o Using a stick or cane while sitting up or standing, the nonsurgical arm moves the injured arm through different motions, including forward flexion, external rotation, and abduction. Completed daily       · Week 6: scapula exercises without the sling, including scapular elevation, depression, retraction, and protraction. Completed daily    PHASE III (Weeks 8-12): active motion       · AROM: while sitting up or standing, actively forward flex and abduct the shoulders. Completed daily            o Important not to hike the shoulder up towards the ear.       · Isometric exercises: push and pull a folded towel or pillow into a wall. Completed daily, holding push/pull for 15s with 30s rest in between for 10-15 reps            o Completed with elbow flexed at 90 degrees and with shoulder internal/external rotation    PHASE IV (Weeks 12-16): resisted exercise       · Begin rotator cuff strengthening using weights and/or elastic bands. Completed 3 times per week for 3-4 sets of 10-15 reps            o Resisted shoulder internal/external rotation            o Resisted deltoid strengthening            o Resisted scapula strengthening            o AVOID doing full can or empty can exercises       · Begin light shoulder stretching            o Hold each stretch for 15s, then rest 15s.  Repeat 5 times            o After Week 16, may use aggressive stretching if needed

## 2024-01-25 ENCOUNTER — OFFICE VISIT (OUTPATIENT)
Dept: PHYSICAL THERAPY | Facility: CLINIC | Age: 74
End: 2024-01-25
Payer: MEDICARE

## 2024-01-25 DIAGNOSIS — M75.121 NONTRAUMATIC COMPLETE TEAR OF RIGHT ROTATOR CUFF: Primary | ICD-10-CM

## 2024-01-25 DIAGNOSIS — Z98.890 S/P RIGHT ROTATOR CUFF REPAIR: ICD-10-CM

## 2024-01-25 PROCEDURE — 97110 THERAPEUTIC EXERCISES: CPT

## 2024-01-25 NOTE — PROGRESS NOTES
Daily Note      Today's date: 2024  Patient name: Dominique Varela  : 1950  MRN: [id number]  Referring provider: Karolyn Valdovinos PA-C  Dx:   Encounter Diagnosis     ICD-10-CM    1. Nontraumatic complete tear of right rotator cuff  M75.121       2. S/P right rotator cuff repair  Z98.890           Subjective: Pt reports that she had increased pain in the right shoulder after stretching last visit. Pt asks how much pain to expect during PROM during PT.      Objective: See treatment diary below    Assessment: Pt tolerated treatment well.  Pt was educated that pain with PROM should not exceed 2-3/10 increase from baseline pain. Pt was educated to communicate if pain was exceeding 5/10 during PROM today and she demonstrated good understanding. Pt reported pain 4/10 at worst during PROM, which decreased immediately after PROM was completed.     Plan: Continue per plan of care.  Progress treatment per protocol.         Insurance:  AMA/CMS Eval/ Re-eval POC expires FOTO Auth Status Co-Insurance   CMS - [facility] 1/15/24 4/15/24  None req No                                        1.  1/15 2.    3.    4.    5.  6.    7.  8.  9.  10.  11.  12.    13.  14.  15.  16.  17.  18.    19.  20. 21. 22. 23. 24.    25.  26. 27. 28. 29. 30.   31. 32.  33. 34. 35. 36.        Precautions: s/p R rotator cuff repair w/ biceps tenotomy 24       EVAL 2 3 4 5 6   Manuals 1/15/24 1/17/24 1/23/24 1/25/24     STM/MFR          Joint mobs                  Neuro Re-Ed         Scap retraction/  protraction 10x 20x 20x 20x     Scap elevation/  depression 10x  20x 20x 20x                                                  Ther Ex         PROM shoulder Flex to ~80-90°, ER to 0° Flex to ~°, ER to 10-15° Flex to ~120-130°, ER to 10-15°, abd to ~70° Flex to ~110°, ER to 10°, abd to ~70°     Pendulums 10x CW, CCW 30/30 30/30 30/30     Elbow flex/ext 5x 2x10 2x10 2x10     Wrist flex/ext  2x10  2x10  2x10      squeezes   30x red ball  30x green ball 30x green ball                                         Ther Activity         Pt education POC, HEP Reviewed HEP and current phase of protocol  Reviewed protocol  Reviewed pain expectations                                                           Modalities         CP R shoulder  10' post-tx  10' post-tx  At home                 Access Code: [email or web address]  URL: [email or web address]  Date: 01/15/2024  Prepared by: Irvin Abdul    Exercises  - Seated Scapular Retraction  - 2-3 x daily - 7 x weekly - 2 sets - 10 reps - 5 hold  - Seated Shoulder Shrugs  - 2-3 x daily - 7 x weekly - 2 sets - 10 reps - 5 hold  - Circular Shoulder Pendulum with Table Support  - 2-3 x daily - 7 x weekly - 1 sets - 30 reps  - Standing Elbow Flexion Extension AROM  - 2-3 x daily - 7 x weekly - 2 sets - 10 reps - 5 hold       Physical Therapy Protocol: Rotator Cuff Repair Immediate Therapy    Based on [facility] Shoulder Protocol    Modalities: performed at the physical therapist's discretion within the guidelines of this protocol.    Wound Care: Keep the dressing clean and dry.  Light drainage may occur the first 2 days postop.  You may remove the dressings and get the incision wet in the shower 72 hours after surgery.  DO NOT remove steri-strips or sutures.  DO NOT immerse the incision under water.  Carefully pat the incision dry.  If there is wound drainage, re-apply a fresh dry gauze dressing.    Sling: wear the sling at all times, including sleep, except for hygiene for 6 weeks following surgery. Keep the arm by the side during hygiene. The patient may remove the sling, keeping the arm by the side to perform gentle wrist and elbow range of motion. The sling may be removed for PT sessions as described below.    PHASE I (Weeks 0-4): passive range of motion       · Begin passive range of motion within 7 days following surgery. Completed 3 times per week       · Begin scapula exercises with the arm in the sling within 7  days following surgery, including scapular elevation, depression, retraction, and protraction. Completed daily Weeks 0-6       · Begin pendulum exercises at home. Completed 2 times per day    PHASE II (Weeks 4-8): active assisted range of motion       · Week 4: lying active assisted motion            o Using a stick or cane while lying flat, the nonsurgical arm moves the injured arm through different motions, including forward flexion, external rotation, and abduction. Completed daily       · Weeks 5: 45-degree active assisted motion            o Using a stick or cane while lying propped at 45 degrees, the nonsurgical arm moves the injured arm through different motions, including forward flexion, external rotation, and abduction. Completed daily       · Weeks 6-8: upright active assisted motion            o Using a stick or cane while sitting up or standing, the nonsurgical arm moves the injured arm through different motions, including forward flexion, external rotation, and abduction. Completed daily       · Week 6: scapula exercises without the sling, including scapular elevation, depression, retraction, and protraction. Completed daily    PHASE III (Weeks 8-12): active motion       · AROM: while sitting up or standing, actively forward flex and abduct the shoulders. Completed daily            o Important not to hike the shoulder up towards the ear.       · Isometric exercises: push and pull a folded towel or pillow into a wall. Completed daily, holding push/pull for 15s with 30s rest in between for 10-15 reps            o Completed with elbow flexed at 90 degrees and with shoulder internal/external rotation    PHASE IV (Weeks 12-16): resisted exercise       · Begin rotator cuff strengthening using weights and/or elastic bands. Completed 3 times per week for 3-4 sets of 10-15 reps            o Resisted shoulder internal/external rotation            o Resisted deltoid strengthening            o Resisted scapula  strengthening            o AVOID doing full can or empty can exercises       · Begin light shoulder stretching            o Hold each stretch for 15s, then rest 15s. Repeat 5 times            o After Week 16, may use aggressive stretching if needed

## 2024-01-30 ENCOUNTER — OFFICE VISIT (OUTPATIENT)
Dept: PHYSICAL THERAPY | Facility: CLINIC | Age: 74
End: 2024-01-30
Payer: MEDICARE

## 2024-01-30 DIAGNOSIS — M75.121 NONTRAUMATIC COMPLETE TEAR OF RIGHT ROTATOR CUFF: Primary | ICD-10-CM

## 2024-01-30 DIAGNOSIS — Z98.890 S/P RIGHT ROTATOR CUFF REPAIR: ICD-10-CM

## 2024-01-30 PROCEDURE — 97110 THERAPEUTIC EXERCISES: CPT

## 2024-01-30 NOTE — PROGRESS NOTES
Daily Note      Today's date: 2024  Patient name: Dominique Varela  : 1950  MRN: [id number]  Referring provider: Karolyn Valdovinos PA-C  Dx:   Encounter Diagnosis     ICD-10-CM    1. Nontraumatic complete tear of right rotator cuff  M75.121       2. S/P right rotator cuff repair  Z98.890           Subjective: Pt reports that she had trouble finding a comfortable position to play a game while sitting/laying on the couch. Pt states she was concerned about the possibility of rolling off of the couch, so she avoided laying on the couch.        Objective: See treatment diary below    Assessment: Pt tolerated treatment well.  Educated pt regarding proper sleep positioning with the sling and she verbalized good compliance with this. Pt demonstrates slight improvement in flexion and abduction PROM while remaining within the discussed pain threshold. Pt noted tightness and pain with ER. Pt will be able to initiate wand-assisted AAROM in supine next visit.      Plan: Continue per plan of care.  Initiate supine wand-assisted ROM.        Insurance:  AMA/CMS Eval/ Re-eval POC expires FOTO Auth Status Co-Insurance   CMS - [facility] 1/15/24 4/15/24  None req No                                        1.  1/15 2.    3.    4.    5.    6.    7.  8.  9.  10.  11.  12.    13.  14.  15.  16.  17.  18.    19.  20. 21. 22. 23. 24.    25.  26. 27. 28. 29. 30.   31. 32.  33. 34. 35. 36.        Precautions: s/p R rotator cuff repair w/ biceps tenotomy 24       EVAL 2 3 4 5 6   Manuals 1/15/24 1/17/24 1/23/24 1/25/24 1/30/24    STM/MFR          Joint mobs                  Neuro Re-Ed         Scap retraction/  protraction 10x 20x 20x 20x 20x     Scap elevation/  depression 10x  20x 20x 20x 20x                                                  Ther Ex         PROM shoulder Flex to ~80-90°, ER to 0° Flex to ~°, ER to 10-15° Flex to ~120-130°, ER to 10-15°, abd to ~70° Flex to ~110°, ER to 10°, abd to ~70° Flex to ~120°,  ER to ~10°, abd to 90-95°    Pendulums 10x CW, CCW 30/30 30/30 30/30 30/30    Elbow flex/ext 5x 2x10 2x10 2x10 2x10    Wrist flex/ext  2x10  2x10  2x10 2x10     squeezes   30x red ball 30x green ball 30x green ball 30x green ball                                        Ther Activity         Pt education POC, HEP Reviewed HEP and current phase of protocol  Reviewed protocol  Reviewed pain expectations Reviewed sleep positioning                                                          Modalities         CP R shoulder  10' post-tx  10' post-tx  At home                 Access Code: [email or web address]  URL: [email or web address]  Date: 01/15/2024  Prepared by: Irvin Abdul    Exercises  - Seated Scapular Retraction  - 2-3 x daily - 7 x weekly - 2 sets - 10 reps - 5 hold  - Seated Shoulder Shrugs  - 2-3 x daily - 7 x weekly - 2 sets - 10 reps - 5 hold  - Circular Shoulder Pendulum with Table Support  - 2-3 x daily - 7 x weekly - 1 sets - 30 reps  - Standing Elbow Flexion Extension AROM  - 2-3 x daily - 7 x weekly - 2 sets - 10 reps - 5 hold       Physical Therapy Protocol: Rotator Cuff Repair Immediate Therapy    Based on [facility] Shoulder Protocol    Modalities: performed at the physical therapist's discretion within the guidelines of this protocol.    Wound Care: Keep the dressing clean and dry.  Light drainage may occur the first 2 days postop.  You may remove the dressings and get the incision wet in the shower 72 hours after surgery.  DO NOT remove steri-strips or sutures.  DO NOT immerse the incision under water.  Carefully pat the incision dry.  If there is wound drainage, re-apply a fresh dry gauze dressing.    Sling: wear the sling at all times, including sleep, except for hygiene for 6 weeks following surgery. Keep the arm by the side during hygiene. The patient may remove the sling, keeping the arm by the side to perform gentle wrist and elbow range of motion. The sling may be removed for PT sessions as  described below.    PHASE I (Weeks 0-4): passive range of motion       · Begin passive range of motion within 7 days following surgery. Completed 3 times per week       · Begin scapula exercises with the arm in the sling within 7 days following surgery, including scapular elevation, depression, retraction, and protraction. Completed daily Weeks 0-6       · Begin pendulum exercises at home. Completed 2 times per day    PHASE II (Weeks 4-8): active assisted range of motion       · Week 4: lying active assisted motion            o Using a stick or cane while lying flat, the nonsurgical arm moves the injured arm through different motions, including forward flexion, external rotation, and abduction. Completed daily       · Weeks 5: 45-degree active assisted motion            o Using a stick or cane while lying propped at 45 degrees, the nonsurgical arm moves the injured arm through different motions, including forward flexion, external rotation, and abduction. Completed daily       · Weeks 6-8: upright active assisted motion            o Using a stick or cane while sitting up or standing, the nonsurgical arm moves the injured arm through different motions, including forward flexion, external rotation, and abduction. Completed daily       · Week 6: scapula exercises without the sling, including scapular elevation, depression, retraction, and protraction. Completed daily    PHASE III (Weeks 8-12): active motion       · AROM: while sitting up or standing, actively forward flex and abduct the shoulders. Completed daily            o Important not to hike the shoulder up towards the ear.       · Isometric exercises: push and pull a folded towel or pillow into a wall. Completed daily, holding push/pull for 15s with 30s rest in between for 10-15 reps            o Completed with elbow flexed at 90 degrees and with shoulder internal/external rotation    PHASE IV (Weeks 12-16): resisted exercise       · Begin rotator cuff  strengthening using weights and/or elastic bands. Completed 3 times per week for 3-4 sets of 10-15 reps            o Resisted shoulder internal/external rotation            o Resisted deltoid strengthening            o Resisted scapula strengthening            o AVOID doing full can or empty can exercises       · Begin light shoulder stretching            o Hold each stretch for 15s, then rest 15s. Repeat 5 times            o After Week 16, may use aggressive stretching if needed

## 2024-02-01 ENCOUNTER — OFFICE VISIT (OUTPATIENT)
Dept: PHYSICAL THERAPY | Facility: CLINIC | Age: 74
End: 2024-02-01
Payer: MEDICARE

## 2024-02-01 DIAGNOSIS — Z98.890 S/P RIGHT ROTATOR CUFF REPAIR: ICD-10-CM

## 2024-02-01 DIAGNOSIS — M75.121 NONTRAUMATIC COMPLETE TEAR OF RIGHT ROTATOR CUFF: Primary | ICD-10-CM

## 2024-02-01 PROCEDURE — 97110 THERAPEUTIC EXERCISES: CPT

## 2024-02-01 NOTE — PROGRESS NOTES
Daily Note      Today's date: 2024  Patient name: Dominique Varela  : 1950  MRN: [id number]  Referring provider: Karolyn Valdovinos PA-C  Dx:   Encounter Diagnosis     ICD-10-CM    1. Nontraumatic complete tear of right rotator cuff  M75.121       2. S/P right rotator cuff repair  Z98.890           Subjective: Pt reports no new complaints in the right shoulder.       Objective: See treatment diary below    Assessment: Pt tolerated treatment well.  Pt presented with similar available motion in PROM, but with decreased resistance to achieve available ROM. Pt introduced to wand exercises in supine for flexion and ER. Pt noted difficulty and increase in pain with flexion AAROM to ~90°. Educated pt regarding expected discomfort with introducing AAROM and pt verbalized understanding.     Plan: Continue per plan of care.  Progress treatment per protocol.         Insurance:  [place]/CMS Eval/ Re-eval POC expires FOTO Auth Status Co-Insurance   CMS - [facility] 1/15/24 4/15/24  None req No                                        1.  1/15 2.    3.    4.    5.    6.      7.  8.  9.  10.  11.  12.    13.  14.  15.  16.  17.  18.    19.  20. 21. 22. 23. 24.    25.  26. 27. 28. 29. 30.   31. 32.  33. 34. 35. 36.        Precautions: s/p R rotator cuff repair w/ biceps tenotomy 24       EVAL 2 3 4 5 6   Manuals 1/15/24 1/17/24 1/23/24 1/25/24 1/30/24 2/1/24   STM/MFR          Joint mobs                  Neuro Re-Ed         Scap retraction/  protraction 10x 20x 20x 20x 20x  20x   Scap elevation/  depression 10x  20x 20x 20x 20x  20x                                                Ther Ex         PROM shoulder Flex to ~80-90°, ER to 0° Flex to ~°, ER to 10-15° Flex to ~120-130°, ER to 10-15°, abd to ~70° Flex to ~110°, ER to 10°, abd to ~70° Flex to ~120°, ER to ~10°, abd to 90-95° Flex to ~120-130°, ER to 15-20°, abd to ~90°   Pendulums 10x CW, CCW 30/30 30/30 30/30 30/30 30/30   Elbow flex/ext 5x 2x10 2x10 2x10  2x10 2x10    Wrist flex/ext  2x10  2x10  2x10 2x10 2x10     squeezes   30x red ball 30x green ball 30x green ball 30x green ball 30x green ball   Cane flexion      10x5s    Cane ER      10x5s    Cane abd                  Ther Activity         Pt education POC, HEP Reviewed HEP and current phase of protocol  Reviewed protocol  Reviewed pain expectations Reviewed sleep positioning Updated HEP                                                         Modalities         CP R shoulder  10' post-tx  10' post-tx  At home                 Access Code: [email or web address]  URL: [email or web address]  Date: 02/01/2024  Prepared by: Irvin Abdul    Exercises  - Seated Scapular Retraction  - 2-3 x daily - 7 x weekly - 2 sets - 10 reps - 5 hold  - Seated Shoulder Shrugs  - 2-3 x daily - 7 x weekly - 2 sets - 10 reps - 5 hold  - Circular Shoulder Pendulum with Table Support  - 2-3 x daily - 7 x weekly - 1 sets - 30 reps  - Standing Elbow Flexion Extension AROM  - 2-3 x daily - 7 x weekly - 2 sets - 10 reps - 5 hold  - Supine Shoulder Flexion with Dowel  - 1 x daily - 7 x weekly - 1 sets - 10 reps - 5 hold  - Supine Shoulder External Rotation with Dowel  - 1 x daily - 7 x weekly - 1 sets - 10 reps - 5 hold       Physical Therapy Protocol: Rotator Cuff Repair Immediate Therapy    Based on [facility] Shoulder Protocol    Modalities: performed at the physical therapist's discretion within the guidelines of this protocol.    Wound Care: Keep the dressing clean and dry.  Light drainage may occur the first 2 days postop.  You may remove the dressings and get the incision wet in the shower 72 hours after surgery.  DO NOT remove steri-strips or sutures.  DO NOT immerse the incision under water.  Carefully pat the incision dry.  If there is wound drainage, re-apply a fresh dry gauze dressing.    Sling: wear the sling at all times, including sleep, except for hygiene for 6 weeks following surgery. Keep the arm by the side during hygiene. The  patient may remove the sling, keeping the arm by the side to perform gentle wrist and elbow range of motion. The sling may be removed for PT sessions as described below.    PHASE I (Weeks 0-4): passive range of motion       · Begin passive range of motion within 7 days following surgery. Completed 3 times per week       · Begin scapula exercises with the arm in the sling within 7 days following surgery, including scapular elevation, depression, retraction, and protraction. Completed daily Weeks 0-6       · Begin pendulum exercises at home. Completed 2 times per day    PHASE II (Weeks 4-8): active assisted range of motion       · Week 4: lying active assisted motion            o Using a stick or cane while lying flat, the nonsurgical arm moves the injured arm through different motions, including forward flexion, external rotation, and abduction. Completed daily       · Weeks 5: 45-degree active assisted motion            o Using a stick or cane while lying propped at 45 degrees, the nonsurgical arm moves the injured arm through different motions, including forward flexion, external rotation, and abduction. Completed daily       · Weeks 6-8: upright active assisted motion            o Using a stick or cane while sitting up or standing, the nonsurgical arm moves the injured arm through different motions, including forward flexion, external rotation, and abduction. Completed daily       · Week 6: scapula exercises without the sling, including scapular elevation, depression, retraction, and protraction. Completed daily    PHASE III (Weeks 8-12): active motion       · AROM: while sitting up or standing, actively forward flex and abduct the shoulders. Completed daily            o Important not to hike the shoulder up towards the ear.       · Isometric exercises: push and pull a folded towel or pillow into a wall. Completed daily, holding push/pull for 15s with 30s rest in between for 10-15 reps            o Completed with  elbow flexed at 90 degrees and with shoulder internal/external rotation    PHASE IV (Weeks 12-16): resisted exercise       · Begin rotator cuff strengthening using weights and/or elastic bands. Completed 3 times per week for 3-4 sets of 10-15 reps            o Resisted shoulder internal/external rotation            o Resisted deltoid strengthening            o Resisted scapula strengthening            o AVOID doing full can or empty can exercises       · Begin light shoulder stretching            o Hold each stretch for 15s, then rest 15s. Repeat 5 times            o After Week 16, may use aggressive stretching if needed

## 2024-02-05 ENCOUNTER — OFFICE VISIT (OUTPATIENT)
Dept: PHYSICAL THERAPY | Facility: CLINIC | Age: 74
End: 2024-02-05
Payer: MEDICARE

## 2024-02-05 DIAGNOSIS — M75.121 NONTRAUMATIC COMPLETE TEAR OF RIGHT ROTATOR CUFF: Primary | ICD-10-CM

## 2024-02-05 DIAGNOSIS — Z98.890 S/P RIGHT ROTATOR CUFF REPAIR: ICD-10-CM

## 2024-02-05 PROCEDURE — 97110 THERAPEUTIC EXERCISES: CPT

## 2024-02-05 NOTE — PROGRESS NOTES
Daily Note      Today's date: 2024  Patient name: Dominique Varela  : 1950  MRN: [id number]  Referring provider: Karolyn Valdovinos PA-C  Dx:   Encounter Diagnosis     ICD-10-CM    1. Nontraumatic complete tear of right rotator cuff  M75.121       2. S/P right rotator cuff repair  Z98.890           Subjective: Pt reports that her shoulder feels good. Pt states she has been applying ice regularly. Pt states she has right wrist pain with no known cause, that began 2-3 days ago.        Objective: See treatment diary below    Assessment: Pt tolerated treatment well.  Pt held from wrist and hand strengthening exercises in order to assess if rest is helpful for recent increase in wrist pain. Pt performed increased repetition of cane exercises and noted more fatigue with this. Pt is progressing well with PROM to date.     Plan: Continue per plan of care.  Progress to cane exercises at 45° incline next visit.         Insurance:  AMA/CMS Eval/ Re-eval POC expires FOTO Auth Status Co-Insurance   CMS - [facility] 1/15/24 4/15/24  None req No                                        1.  1/15 2.    3.    4.    5.    6.   2   7.   2 8.  9.  10.  11.  12.    13.  14.  15.  16.  17.  18.    19.  20. 21. 22. 23. 24.    25.  26. 27. 28. 29. 30.   31. 32.  33. 34. 35. 36.        Precautions: s/p R rotator cuff repair w/ biceps tenotomy 24       3 4 5 6 7    Manuals 24    STM/MFR          Joint mobs                  Neuro Re-Ed         Scap retraction/  protraction 20x 20x 20x  20x 20x    Scap elevation/  depression 20x 20x 20x  20x 20x                                                 Ther Ex         PROM shoulder Flex to ~120-130°, ER to 10-15°, abd to ~70° Flex to ~110°, ER to 10°, abd to ~70° Flex to ~120°, ER to ~10°, abd to 90-95° Flex to ~120-130°, ER to 15-20°, abd to ~90° Flex, ER, abd    Pendulums 30/30 30/30 30/30 30/30 30/30    Elbow flex/ext 2x10 2x10 2x10 2x10  2x10      Wrist flex/ext 2x10  2x10 2x10 2x10  Held      squeezes  30x green ball 30x green ball 30x green ball 30x green ball     Cane flexion    10x5s  10x5s    Cane ER    10x5s  10x5s    Cane abd                  Ther Activity         Pt education Reviewed protocol  Reviewed pain expectations Reviewed sleep positioning Updated HEP                                                           Modalities         CP R shoulder 10' post-tx  At home                   Access Code: [email or web address]  URL: [email or web address]  Date: 02/01/2024  Prepared by: Irvin Abdul    Exercises  - Seated Scapular Retraction  - 2-3 x daily - 7 x weekly - 2 sets - 10 reps - 5 hold  - Seated Shoulder Shrugs  - 2-3 x daily - 7 x weekly - 2 sets - 10 reps - 5 hold  - Circular Shoulder Pendulum with Table Support  - 2-3 x daily - 7 x weekly - 1 sets - 30 reps  - Standing Elbow Flexion Extension AROM  - 2-3 x daily - 7 x weekly - 2 sets - 10 reps - 5 hold  - Supine Shoulder Flexion with Dowel  - 1 x daily - 7 x weekly - 1 sets - 10 reps - 5 hold  - Supine Shoulder External Rotation with Dowel  - 1 x daily - 7 x weekly - 1 sets - 10 reps - 5 hold       Physical Therapy Protocol: Rotator Cuff Repair Immediate Therapy    Based on Moon Shoulder Protocol    Modalities: performed at the physical therapist's discretion within the guidelines of this protocol.    Wound Care: Keep the dressing clean and dry.  Light drainage may occur the first 2 days postop.  You may remove the dressings and get the incision wet in the shower 72 hours after surgery.  DO NOT remove steri-strips or sutures.  DO NOT immerse the incision under water.  Carefully pat the incision dry.  If there is wound drainage, re-apply a fresh dry gauze dressing.    Sling: wear the sling at all times, including sleep, except for hygiene for 6 weeks following surgery. Keep the arm by the side during hygiene. The patient may remove the sling, keeping the arm by the side to perform gentle  wrist and elbow range of motion. The sling may be removed for PT sessions as described below.    PHASE I (Weeks 0-4): passive range of motion       · Begin passive range of motion within 7 days following surgery. Completed 3 times per week       · Begin scapula exercises with the arm in the sling within 7 days following surgery, including scapular elevation, depression, retraction, and protraction. Completed daily Weeks 0-6       · Begin pendulum exercises at home. Completed 2 times per day    PHASE II (Weeks 4-8): active assisted range of motion       · Week 4: lying active assisted motion            o Using a stick or cane while lying flat, the nonsurgical arm moves the injured arm through different motions, including forward flexion, external rotation, and abduction. Completed daily       · Weeks 5: 45-degree active assisted motion            o Using a stick or cane while lying propped at 45 degrees, the nonsurgical arm moves the injured arm through different motions, including forward flexion, external rotation, and abduction. Completed daily       · Weeks 6-8: upright active assisted motion            o Using a stick or cane while sitting up or standing, the nonsurgical arm moves the injured arm through different motions, including forward flexion, external rotation, and abduction. Completed daily       · Week 6: scapula exercises without the sling, including scapular elevation, depression, retraction, and protraction. Completed daily    PHASE III (Weeks 8-12): active motion       · AROM: while sitting up or standing, actively forward flex and abduct the shoulders. Completed daily            o Important not to hike the shoulder up towards the ear.       · Isometric exercises: push and pull a folded towel or pillow into a wall. Completed daily, holding push/pull for 15s with 30s rest in between for 10-15 reps            o Completed with elbow flexed at 90 degrees and with shoulder internal/external  rotation    PHASE IV (Weeks 12-16): resisted exercise       · Begin rotator cuff strengthening using weights and/or elastic bands. Completed 3 times per week for 3-4 sets of 10-15 reps            o Resisted shoulder internal/external rotation            o Resisted deltoid strengthening            o Resisted scapula strengthening            o AVOID doing full can or empty can exercises       · Begin light shoulder stretching            o Hold each stretch for 15s, then rest 15s. Repeat 5 times            o After Week 16, may use aggressive stretching if needed

## 2024-02-06 ENCOUNTER — OFFICE VISIT (OUTPATIENT)
Dept: OBGYN CLINIC | Facility: CLINIC | Age: 74
End: 2024-02-06

## 2024-02-06 ENCOUNTER — APPOINTMENT (OUTPATIENT)
Dept: PHYSICAL THERAPY | Facility: CLINIC | Age: 74
End: 2024-02-06
Payer: MEDICARE

## 2024-02-06 VITALS
DIASTOLIC BLOOD PRESSURE: 86 MMHG | BODY MASS INDEX: 38.62 KG/M2 | SYSTOLIC BLOOD PRESSURE: 147 MMHG | HEIGHT: 63 IN | WEIGHT: 218 LBS | HEART RATE: 89 BPM

## 2024-02-06 DIAGNOSIS — Z98.890 S/P ARTHROSCOPY OF RIGHT SHOULDER: Primary | ICD-10-CM

## 2024-02-06 DIAGNOSIS — M25.631 STIFFNESS OF RIGHT WRIST JOINT: ICD-10-CM

## 2024-02-06 PROCEDURE — 99024 POSTOP FOLLOW-UP VISIT: CPT | Performed by: ORTHOPAEDIC SURGERY

## 2024-02-06 RX ORDER — TOLTERODINE 2 MG/1
2 CAPSULE, EXTENDED RELEASE ORAL DAILY
COMMUNITY
Start: 2024-01-03

## 2024-02-06 NOTE — PROGRESS NOTES
SUBJECTIVE:  Patient is a 73 y.o. year old female who presents for follow up now 4.5 weeks s/p Right shoulder arthroscopic rotator cuff repair, biceps tenotomy, Extensive Debridement - Right performed on  1/4/2024.  Today, the patient reports atraumatic right wrist pain that started 3-4 days ago. She is removing the sling at least once daily and during PT to perform elbow range of motion and moves her fingers consistently when in the sling. She has not performed much active or passive wrist motion in or out of the sling. Otherwise, the patient states the shoulder is doing well and she does not have any significant pain.      VITALS:  Vitals:    02/06/24 1016   BP: 147/86   Pulse: 89       PHYSICAL EXAMINATION:  General: well developed and well nourished, alert, oriented times 3, and appears comfortable  Psychiatric: Normal    MUSCULOSKELETAL EXAMINATION:    Incision: Clean, dry, and intact  Shoulder Range of Motion: not assessed per protocol  Mild pain with active and passive wrist flexion/extension  Mild swelling throughout the dorsal aspect of the hand and fingers - nearly symmetric side to side  Able to make a fist  4+/5  strength  Neurovascular status: intact. Skin warm and dry. 2+ radial pulse. Motor intact radial/ain/pin/ulnar      PLAN:    I believe the patient is doing well overall regarding her rotator cuff repair. We discussed that the wrist can become stiff with spending the majority of the day in the sling and without performing extensive motion. I encouraged the patient to continue emphasizing elbow, wrist, and finger range of motion in addition to working on  strengthening in PT and on her own at home every day. We also discussed keeping the hand elevated while in a lying or reclined position to address the minimal swelling in her hand.    Regarding her rotator cuff repair, continue wearing the sling at all times, including sleep, until 6 weeks post-op. The sling can be removed for gentle  elbow/wrist/finger motion and hygiene. Continue physical therapy per provided protocol. She may use OTC medications as needed for pain control.     Follow up as previously scheduled on 2/12/24 for next post-op visit.

## 2024-02-08 ENCOUNTER — OFFICE VISIT (OUTPATIENT)
Dept: PHYSICAL THERAPY | Facility: CLINIC | Age: 74
End: 2024-02-08
Payer: MEDICARE

## 2024-02-08 DIAGNOSIS — M75.121 NONTRAUMATIC COMPLETE TEAR OF RIGHT ROTATOR CUFF: Primary | ICD-10-CM

## 2024-02-08 DIAGNOSIS — Z98.890 S/P RIGHT ROTATOR CUFF REPAIR: ICD-10-CM

## 2024-02-08 PROCEDURE — 97110 THERAPEUTIC EXERCISES: CPT

## 2024-02-08 NOTE — PROGRESS NOTES
Daily Note      Today's date: 2024  Patient name: Dominique Varela  : 1950  MRN: [id number]  Referring provider: Karolyn Valdovinos PA-C  Dx:   Encounter Diagnosis     ICD-10-CM    1. Nontraumatic complete tear of right rotator cuff  M75.121       2. S/P right rotator cuff repair  Z98.890           Subjective: Pt reports that she contacted her orthopedic surgeon on Tuesday due to worsening wrist pain. Pt states she was advised that this is due to immobilization and stiffness in the wrist. Pt states she has made an effort to move her wrist and hand more at home and spent more time out of the sling and propped her arm with pillows. Pt states her wrist is getting better.        Objective: See treatment diary below    Assessment: Pt tolerated treatment well.  Pt resumed with wrist flex/ext and noted no increase in pain. Pt introduced to forearm pronation/supination with arm supported and noted no pain with this. Educated pt to perform HEP more frequently in order to improve mobility and decrease duration in dependent position. Pt was also educated on spending time out of the sling at rest with pillow(s) for support and elevating the wrist to prevent accumulation of swelling. Pt verbalized understanding.    Plan: Continue per plan of care.  Continue with 45° wand exercises until week 6 post-op.         Insurance:  AMA/CMS Eval/ Re-eval POC expires FOTO Auth Status Co-Insurance   CMS - [facility] 1/15/24 4/15/24  None req No                                        1.  1/15 2.    3.    4.    5.    6.   2   7.   2 8.    9.  10.  11.  12.    13.  14.  15.  16.  17.  18.    19.  20. 21. 22. 23. 24.    25.  26. 27. 28. 29. 30.   31. 32.  33. 34. 35. 36.        Precautions: s/p R rotator cuff repair w/ biceps tenotomy 24       3 4 5 6 7 8   Manuals 24   STM/MFR          Joint mobs                  Neuro Re-Ed         Scap retraction/  protraction 20x 20x 20x  20x  20x 20x   Scap elevation/  depression 20x 20x 20x  20x 20x 20x                                                Ther Ex         PROM shoulder Flex to ~120-130°, ER to 10-15°, abd to ~70° Flex to ~110°, ER to 10°, abd to ~70° Flex to ~120°, ER to ~10°, abd to 90-95° Flex to ~120-130°, ER to 15-20°, abd to ~90° Flex, ER, abd Flex, ER, abd; HOB elevated to 45°   Pendulums 30/30 30/30 30/30 30/30 30/30 30/30   Elbow flex/ext 2x10 2x10 2x10 2x10  2x10  2x10   Wrist flex/ext 2x10  2x10 2x10 2x10  Held  2x10 flex/ext  2x10 pro/sup    squeezes  30x green ball 30x green ball 30x green ball 30x green ball     Cane flexion    10x5s  10x5s 10x5s at 45°   Cane ER    10x5s  10x5s 10x5s at 45°   Cane abd                  Ther Activity         Pt education Reviewed protocol  Reviewed pain expectations Reviewed sleep positioning Updated HEP  Reviewed HEP, reviewed increasing wrist motion, positioning out of sling with pillow                                                         Modalities         CP R shoulder 10' post-tx  At home                   Access Code: [email or web address]  URL: [email or web address]  Date: 02/01/2024  Prepared by: Irvin Abdul    Exercises  - Seated Scapular Retraction  - 2-3 x daily - 7 x weekly - 2 sets - 10 reps - 5 hold  - Seated Shoulder Shrugs  - 2-3 x daily - 7 x weekly - 2 sets - 10 reps - 5 hold  - Circular Shoulder Pendulum with Table Support  - 2-3 x daily - 7 x weekly - 1 sets - 30 reps  - Standing Elbow Flexion Extension AROM  - 2-3 x daily - 7 x weekly - 2 sets - 10 reps - 5 hold  - Supine Shoulder Flexion with Dowel  - 1 x daily - 7 x weekly - 1 sets - 10 reps - 5 hold  - Supine Shoulder External Rotation with Dowel  - 1 x daily - 7 x weekly - 1 sets - 10 reps - 5 hold       Physical Therapy Protocol: Rotator Cuff Repair Immediate Therapy    Based on Moon Shoulder Protocol    Modalities: performed at the physical therapist's discretion within the guidelines of this protocol.    Wound  Care: Keep the dressing clean and dry.  Light drainage may occur the first 2 days postop.  You may remove the dressings and get the incision wet in the shower 72 hours after surgery.  DO NOT remove steri-strips or sutures.  DO NOT immerse the incision under water.  Carefully pat the incision dry.  If there is wound drainage, re-apply a fresh dry gauze dressing.    Sling: wear the sling at all times, including sleep, except for hygiene for 6 weeks following surgery. Keep the arm by the side during hygiene. The patient may remove the sling, keeping the arm by the side to perform gentle wrist and elbow range of motion. The sling may be removed for PT sessions as described below.    PHASE I (Weeks 0-4): passive range of motion       · Begin passive range of motion within 7 days following surgery. Completed 3 times per week       · Begin scapula exercises with the arm in the sling within 7 days following surgery, including scapular elevation, depression, retraction, and protraction. Completed daily Weeks 0-6       · Begin pendulum exercises at home. Completed 2 times per day    PHASE II (Weeks 4-8): active assisted range of motion       · Week 4: lying active assisted motion            o Using a stick or cane while lying flat, the nonsurgical arm moves the injured arm through different motions, including forward flexion, external rotation, and abduction. Completed daily       · Weeks 5: 45-degree active assisted motion            o Using a stick or cane while lying propped at 45 degrees, the nonsurgical arm moves the injured arm through different motions, including forward flexion, external rotation, and abduction. Completed daily       · Weeks 6-8: upright active assisted motion            o Using a stick or cane while sitting up or standing, the nonsurgical arm moves the injured arm through different motions, including forward flexion, external rotation, and abduction. Completed daily       · Week 6: scapula exercises  without the sling, including scapular elevation, depression, retraction, and protraction. Completed daily    PHASE III (Weeks 8-12): active motion       · AROM: while sitting up or standing, actively forward flex and abduct the shoulders. Completed daily            o Important not to hike the shoulder up towards the ear.       · Isometric exercises: push and pull a folded towel or pillow into a wall. Completed daily, holding push/pull for 15s with 30s rest in between for 10-15 reps            o Completed with elbow flexed at 90 degrees and with shoulder internal/external rotation    PHASE IV (Weeks 12-16): resisted exercise       · Begin rotator cuff strengthening using weights and/or elastic bands. Completed 3 times per week for 3-4 sets of 10-15 reps            o Resisted shoulder internal/external rotation            o Resisted deltoid strengthening            o Resisted scapula strengthening            o AVOID doing full can or empty can exercises       · Begin light shoulder stretching            o Hold each stretch for 15s, then rest 15s. Repeat 5 times            o After Week 16, may use aggressive stretching if needed

## 2024-02-12 ENCOUNTER — OFFICE VISIT (OUTPATIENT)
Dept: OBGYN CLINIC | Facility: CLINIC | Age: 74
End: 2024-02-12

## 2024-02-12 VITALS
HEART RATE: 91 BPM | SYSTOLIC BLOOD PRESSURE: 144 MMHG | WEIGHT: 218 LBS | DIASTOLIC BLOOD PRESSURE: 91 MMHG | HEIGHT: 63 IN | BODY MASS INDEX: 38.62 KG/M2

## 2024-02-12 DIAGNOSIS — Z98.890 S/P ARTHROSCOPY OF RIGHT SHOULDER: Primary | ICD-10-CM

## 2024-02-12 PROCEDURE — 99024 POSTOP FOLLOW-UP VISIT: CPT | Performed by: ORTHOPAEDIC SURGERY

## 2024-02-12 NOTE — PROGRESS NOTES
SUBJECTIVE:  Patient is a 73 y.o. year old female who presents for follow up now 5.5 weeks s/p Right shoulder arthroscopic rotator cuff repair, biceps tenotomy, Extensive Debridement - Right performed on  1/4/2024.  Today patient has been attending physical therapy, which she feels has been going well. She has also been completing home exercises. She states she experiences muscle fatigue about the shoulder after completing her exercises. She presents to the office wearing the post operative sling. She has been experiencing some mild soreness about the right shoulder and arm.      VITALS:  Vitals:    02/12/24 1023   BP: 144/91   Pulse: 91       PHYSICAL EXAMINATION:  General: well developed and well nourished, alert, oriented times 3, and appears comfortable  Psychiatric: Normal    MUSCULOSKELETAL EXAMINATION:    Incision: Clean, dry, and intact  Range of Motion: ER: 10° passively, FF 80° passively  Strength: ER 4/5, IR 4/5, Empty Can 4/5  Neurovascular status: intact      PLAN:     She may wean out of it gradually, beginning to remove the sling at home.  Then continuing to wean out of it progressively.  She should be completely out of the sling by 8 weeks.    I am happy that her wrist motion and pain has improved.  I do feel she is slightly behind where would like her to be in terms of range of motion passively.  I encouraged her to continue doing home exercises daily.  I provided some additional stretching exercises to do on her own.  I would like her to do a more consistent range of motion program at home and will ask her physical therapy to help with this process and providing a home program with more stretching.    I am going to see her back in 2 weeks to follow her motion closely.    OTC NSAIDs or Tylenol as needed for pain control.     Follow up in 2 weeks for a range of motion check.     Scribe Attestation      I,:  Mildred Ramirez am acting as a scribe while in the presence of the attending physician.:       I,:   Everette Keys MD personally performed the services described in this documentation    as scribed in my presence.:

## 2024-02-13 ENCOUNTER — APPOINTMENT (OUTPATIENT)
Dept: PHYSICAL THERAPY | Facility: CLINIC | Age: 74
End: 2024-02-13
Payer: MEDICARE

## 2024-02-14 ENCOUNTER — OFFICE VISIT (OUTPATIENT)
Dept: PHYSICAL THERAPY | Facility: CLINIC | Age: 74
End: 2024-02-14
Payer: MEDICARE

## 2024-02-14 DIAGNOSIS — Z98.890 S/P RIGHT ROTATOR CUFF REPAIR: ICD-10-CM

## 2024-02-14 DIAGNOSIS — M75.121 NONTRAUMATIC COMPLETE TEAR OF RIGHT ROTATOR CUFF: Primary | ICD-10-CM

## 2024-02-14 PROCEDURE — 97110 THERAPEUTIC EXERCISES: CPT

## 2024-02-14 NOTE — PROGRESS NOTES
Daily Note      Today's date: 2024  Patient name: Dominique Varela  : 1950  MRN: [id number]  Referring provider: Karolyn Valdovinos PA-C  Dx:   Encounter Diagnosis     ICD-10-CM    1. Nontraumatic complete tear of right rotator cuff  M75.121       2. S/P right rotator cuff repair  Z98.890           Subjective: Pt reports that she followed up with orthopedics and was asked to hold her arm up and it fell down right away. Pt states that her orthopedic surgeon showed her an exercise where she slides her arm along a table. Pt states that she has pain when lifting the cane while laying down. Pt states that after 6 reps, she felt nauseous. Pt states that table sliding exercise does not hurt.        Objective: See treatment diary below    Assessment: Pt tolerated treatment well.  Pt educated regarding the effect of positioning on difficulty with cane exercises, as she admitted to performing cane flexion while sitting this morning. Pt was educated that the protocol progresses from supine to incline to upright in order to increase from easiest difficulty to most difficult. Pt was introduced to table slides (seated flexion with physioball) and finger ladder and was educated in alternatives to perform at home. Pt has a good overall ROM program, but was encouraged to rest out of the sling and to incorporate ROM exercises more frequently.  Pt was also educated to attempt to perform eccentric phase of cane exercises with increased focus on control of RUE.     Plan: Continue per plan of care. Progress to upright cane-assisted ROM.         Insurance:  AMA/CMS Eval/ Re-eval POC expires FOTO Auth Status Co-Insurance   CMS - [facility] 1/15/24 4/15/24  None req No                                        1.  1/15 2.    3.    4.    5.    6.   2/   7.   2/ 8.   2/8 9.    10.  11.  12.    13.  14.  15.  16.  17.  18.    19.  20. 21. 22. 23. 24.    25.  26. 27. 28. 29. 30.   31. 32.  33. 34. 35. 36.        Precautions:  s/p R rotator cuff repair w/ biceps tenotomy 1/4/24       5 6 7 8 9    Manuals 1/30/24 2/1/24 2/5/24 2/8/24 2/14/24    STM/MFR          Joint mobs                  Neuro Re-Ed         Scap retraction/  protraction 20x  20x 20x 20x 20x    Scap elevation/  depression 20x  20x 20x 20x 20x                                                 Ther Ex         PROM shoulder Flex to ~120°, ER to ~10°, abd to 90-95° Flex to ~120-130°, ER to 15-20°, abd to ~90° Flex, ER, abd Flex, ER, abd; HOB elevated to 45° Flex, ER, abd, HOB elevated to 45°    Pendulums 30/30 30/30 30/30 30/30 30/30    Elbow flex/ext 2x10 2x10  2x10  2x10 2x10     Wrist flex/ext 2x10 2x10  Held  2x10 flex/ext  2x10 pro/sup 2x10 flex/ext  2x10 pro/sup     squeezes  30x green ball 30x green ball       Cane flexion  10x5s  10x5s 10x5s at 45° 10x5s at 45°    Cane ER  10x5s  10x5s 10x5s at 45° 10x5s at 45°    Cane abd         Table slides         Finger ladder         Ther Activity         Pt education Reviewed sleep positioning Updated HEP  Reviewed HEP, reviewed increasing wrist motion, positioning out of sling with pillow                                                           Modalities         CP R shoulder                     Access Code: [email or web address]  URL: [email or web address]  Date: 02/01/2024  Prepared by: Irvin Abdul    Exercises  - Seated Scapular Retraction  - 2-3 x daily - 7 x weekly - 2 sets - 10 reps - 5 hold  - Seated Shoulder Shrugs  - 2-3 x daily - 7 x weekly - 2 sets - 10 reps - 5 hold  - Circular Shoulder Pendulum with Table Support  - 2-3 x daily - 7 x weekly - 1 sets - 30 reps  - Standing Elbow Flexion Extension AROM  - 2-3 x daily - 7 x weekly - 2 sets - 10 reps - 5 hold  - Supine Shoulder Flexion with Dowel  - 1 x daily - 7 x weekly - 1 sets - 10 reps - 5 hold  - Supine Shoulder External Rotation with Dowel  - 1 x daily - 7 x weekly - 1 sets - 10 reps - 5 hold       Physical Therapy Protocol: Rotator Cuff Repair Immediate  Therapy    Based on New Shoulder Protocol    Modalities: performed at the physical therapist's discretion within the guidelines of this protocol.    Wound Care: Keep the dressing clean and dry.  Light drainage may occur the first 2 days postop.  You may remove the dressings and get the incision wet in the shower 72 hours after surgery.  DO NOT remove steri-strips or sutures.  DO NOT immerse the incision under water.  Carefully pat the incision dry.  If there is wound drainage, re-apply a fresh dry gauze dressing.    Sling: wear the sling at all times, including sleep, except for hygiene for 6 weeks following surgery. Keep the arm by the side during hygiene. The patient may remove the sling, keeping the arm by the side to perform gentle wrist and elbow range of motion. The sling may be removed for PT sessions as described below.    PHASE I (Weeks 0-4): passive range of motion       · Begin passive range of motion within 7 days following surgery. Completed 3 times per week       · Begin scapula exercises with the arm in the sling within 7 days following surgery, including scapular elevation, depression, retraction, and protraction. Completed daily Weeks 0-6       · Begin pendulum exercises at home. Completed 2 times per day    PHASE II (Weeks 4-8): active assisted range of motion       · Week 4: lying active assisted motion            o Using a stick or cane while lying flat, the nonsurgical arm moves the injured arm through different motions, including forward flexion, external rotation, and abduction. Completed daily       · Weeks 5: 45-degree active assisted motion            o Using a stick or cane while lying propped at 45 degrees, the nonsurgical arm moves the injured arm through different motions, including forward flexion, external rotation, and abduction. Completed daily       · Weeks 6-8: upright active assisted motion            o Using a stick or cane while sitting up or standing, the nonsurgical arm  moves the injured arm through different motions, including forward flexion, external rotation, and abduction. Completed daily       · Week 6: scapula exercises without the sling, including scapular elevation, depression, retraction, and protraction. Completed daily    PHASE III (Weeks 8-12): active motion       · AROM: while sitting up or standing, actively forward flex and abduct the shoulders. Completed daily            o Important not to hike the shoulder up towards the ear.       · Isometric exercises: push and pull a folded towel or pillow into a wall. Completed daily, holding push/pull for 15s with 30s rest in between for 10-15 reps            o Completed with elbow flexed at 90 degrees and with shoulder internal/external rotation    PHASE IV (Weeks 12-16): resisted exercise       · Begin rotator cuff strengthening using weights and/or elastic bands. Completed 3 times per week for 3-4 sets of 10-15 reps            o Resisted shoulder internal/external rotation            o Resisted deltoid strengthening            o Resisted scapula strengthening            o AVOID doing full can or empty can exercises       · Begin light shoulder stretching            o Hold each stretch for 15s, then rest 15s. Repeat 5 times            o After Week 16, may use aggressive stretching if needed

## 2024-02-15 ENCOUNTER — EVALUATION (OUTPATIENT)
Dept: PHYSICAL THERAPY | Facility: CLINIC | Age: 74
End: 2024-02-15
Payer: MEDICARE

## 2024-02-15 DIAGNOSIS — M75.121 NONTRAUMATIC COMPLETE TEAR OF RIGHT ROTATOR CUFF: Primary | ICD-10-CM

## 2024-02-15 DIAGNOSIS — Z98.890 S/P RIGHT ROTATOR CUFF REPAIR: ICD-10-CM

## 2024-02-15 PROCEDURE — 97110 THERAPEUTIC EXERCISES: CPT

## 2024-02-15 NOTE — PROGRESS NOTES
PT Re-Evaluation     Today's date: 2/15/2024  Patient name: Dominique Varela  : 1950  MRN: [id number]  Referring provider: Karolyn Valdovinos PA-C  Dx:   Encounter Diagnosis     ICD-10-CM    1. Nontraumatic complete tear of right rotator cuff  M75.121       2. S/P right rotator cuff repair  Z98.890                      Assessment  Assessment details: Dominique Varela has been seen in hospital-based outpatient PT for 10 visits s/p right rotator cuff repair with biceps tenotomy, which occurred on 24. Patient presents gradual progress in overall ROM over the past month. Patient presents with the following improvements noted: decreased right shoulder pain and improved R shoulder PROM/AAROM. Patient continues to present with the following impairments: right shoulder pain, limited right shoulder P/AAROM, limited right UE strength, and postural dysfunction. Due to these impairments, patient continues to have difficulty performing the following: ADL's, lifting/carrying, reaching overhead, self-care activities, bed mobility, sidelying right, putting on/taking off shoes/socks, household chores, shopping, and sleeping. Patient has been educated in updated plan of care. Patient would benefit from continued skilled physical therapy services to address the above functional limitations and progress towards prior level of function and independence with home exercise program.  Impairments: abnormal muscle firing, abnormal or restricted ROM, activity intolerance, impaired physical strength, lacks appropriate home exercise program, pain with function, scapular dyskinesis, weight-bearing intolerance, poor posture  and poor body mechanics  Understanding of Dx/Px/POC: good   Prognosis: good    Goals  Short Term Goals [3 weeks; target date: 2/15/24]  1. Patient will be independent with initial HEP. - goal met  2. Patient will demonstrate an increase in right shoulder flexion PROM to 130°. - in progress    Long Term Goals [6 weeks;  target date: 4/15/24]  1. Patient will be independent with comprehensive HEP.  - in progress  2. Patient will demonstrate an increase in right shoulder AROM to WNL in order to promote bathing/dressing without pain. - in progress  3. Patient will demonstrate an increase in right shoulder strength to at least 4/5 on MMT in order to promote pain-free carrying/lifting. - in progress  4. Patient will be able to place a 2 lb. weight on a shelf above shoulder height with proper scapulohumeral mechanics. - in progress  5. Patient will be able to perform ADLs with right shoulder pain of 2/10 at worst. - in progress    Plan  Patient would benefit from: skilled physical therapy  Planned modality interventions: cryotherapy, electrical stimulation/Russian stimulation, TENS, ultrasound, high voltage pulsed current: pain management, thermotherapy: hydrocollator packs, unattended electrical stimulation and high voltage pulsed current: spasm management  Planned therapy interventions: flexibility, functional ROM exercises, graded exercise, home exercise program, joint mobilization, manual therapy, neuromuscular re-education, patient education, strengthening, stretching, therapeutic exercise, therapeutic activities, activity modification, postural training, body mechanics training, graded activity, self care, muscle pump exercises, abdominal trunk stabilization, ADL retraining, ADL training, IADL retraining, breathing training, behavior modification, therapeutic training, transfer training and Hernandez taping  Frequency: 2-3x/week.  Duration in weeks: 8  Plan of Care beginning date: 2/15/2024  Plan of Care expiration date: 4/15/2024  Treatment plan discussed with: patient    Subjective Evaluation    History of Present Illness  Date of surgery: 1/4/2024  Mechanism of injury: (Information regarding KIMBERLY was taken from evaluation at this facility on 10/16/23.)     Pt reports R shoulder pain over the past 3 weeks, which she attributes to  moving and pushing boxes around her home as she is trying to get rid of things around her home. Pt states that while pushing with her arm to get up from her sectional couch, she has pain in the shoulder. Pt states that reaching back will sometimes cause popping and mild pain. Pt denies difficulty with getting dressed, bathing, or getting ready. Pt states she had an x-ray taken when she saw orthopedics, which was normal. Pt was referred to PT.     (1/15/24) Pt attended PT at this facility from 2023 - 2023 and stopped to pursue further testing with orthopedics. An MRI in 2023 revealed supraspinatus tear, low-grade infraspinatus and subscapularis tear, and medially subluxed biceps long head tendon as per MRI report. Pt elected to undergo RTC repair surgery on 24 and is doing well overall. Pt states that she has been able to shower with the help of her daughter. Pt states that resting her arm at her side while showering makes the shoulder feel achy, but otherwise pain has been improving overall.     (2/15/24) Pt reports good progress since starting PT. Pt notes improvements in pain, ROM, and use of the right arm. Pt states she is better able to button her shirt, which she was not able to do a month ago. Pt states that she is still limited in reaching up, reaching behind the back, and in the strength of the right arm.    Patient Goals  Patient goal: To get back to normal  Pain  Current pain ratin  At best pain ratin  At worst pain ratin  Location: R shoulder  Quality: dull ache  Relieving factors: ice and medications  Exacerbated by: Resting with arm down.  Progression: improved    Social Support  Lives with: Son; daughter is available to come 2x/week to help with bathing.    Employment status: not working  Hand dominance: left  Exercise history: Walking 2x/week      Diagnostic Tests  MRI studies: abnormal  Treatments  Previous treatment: physical therapy      Objective      Postural Observations    Additional Postural Observation Details  Slight forward head, rounded shoulder posture     Observations     Additional Observation Details  Wearing right ultrasling    Active Range of Motion     Additional Active Range of Motion Details  R shoulder AAROM with cane  Flexion = 92°  ER arm at side = 30° with end range tightness    Passive Range of Motion     Right Shoulder   Flexion: 130 degrees with pain  Abduction: 90 degrees with pain  External rotation 0°: 30 degrees with pain  Internal rotation 0°: WFL    Strength/Myotome Testing     Additional Strength Details  Strength testing deferred due to recent surgery                Insurance:  AMA/CMS Eval/ Re-eval POC expires FOTO Auth Status Co-Insurance   CMS - [facility] 1/15/24 4/15/24  None req No                                        1.  1/15 2.   1/17 3.   1/23 4.   1/25 5.   1/30 6.   2/1   7.   2/5 8.   2/8 9.   2/14 10.   2/15 RE 11.  12.    13.  14.  15.  16.  17.  18.    19.  20. 21. 22. 23. 24.    25.  26. 27. 28. 29. 30.   31. 32.  33. 34. 35. 36.        Precautions: s/p R rotator cuff repair w/ biceps tenotomy 1/4/24       5 6 7 8 9 10 (RE-EVAL)   Manuals 1/30/24 2/1/24 2/5/24 2/8/24 2/14/24 2/15/24   STM/MFR          Joint mobs                  Neuro Re-Ed         Scap retraction/  protraction 20x  20x 20x 20x 20x 20x   Scap elevation/  depression 20x  20x 20x 20x 20x 20x                                                Ther Ex         PROM shoulder Flex to ~120°, ER to ~10°, abd to 90-95° Flex to ~120-130°, ER to 15-20°, abd to ~90° Flex, ER, abd Flex, ER, abd; HOB elevated to 45° Flex, ER, abd, HOB elevated to 45° Flex, ER, abd HOB elevated to 45°   Pendulums 30/30 30/30 30/30 30/30 30/30 30/30   Elbow flex/ext 2x10 2x10  2x10  2x10 2x10  2x10   Wrist flex/ext 2x10 2x10  Held  2x10 flex/ext  2x10 pro/sup 2x10 flex/ext  2x10 pro/sup 2x10 flex/ext  2x10 pro/sup    squeezes  30x green ball 30x green ball       Cane flexion  10x5s   10x5s 10x5s at 45° 10x5s at 45° 10x5s at 45°   Cane ER  10x5s  10x5s 10x5s at 45° 10x5s at 45° 10x5s at 45°   Cane abd         Table slides      2x10 (5s)    Finger ladder      10x    Ther Activity         Pt education Reviewed sleep positioning Updated HEP  Reviewed HEP, reviewed increasing wrist motion, positioning out of sling with pillow  Reviewed progress, reviewed exercises for improving ROM in all planes                                                         Modalities         CP R shoulder                     Access Code: [email or web address]  URL: [email or web address]  Date: 02/01/2024  Prepared by: Irvin Abdul    Exercises  - Seated Scapular Retraction  - 2-3 x daily - 7 x weekly - 2 sets - 10 reps - 5 hold  - Seated Shoulder Shrugs  - 2-3 x daily - 7 x weekly - 2 sets - 10 reps - 5 hold  - Circular Shoulder Pendulum with Table Support  - 2-3 x daily - 7 x weekly - 1 sets - 30 reps  - Standing Elbow Flexion Extension AROM  - 2-3 x daily - 7 x weekly - 2 sets - 10 reps - 5 hold  - Supine Shoulder Flexion with Dowel  - 1 x daily - 7 x weekly - 1 sets - 10 reps - 5 hold  - Supine Shoulder External Rotation with Dowel  - 1 x daily - 7 x weekly - 1 sets - 10 reps - 5 hold       Physical Therapy Protocol: Rotator Cuff Repair Immediate Therapy    Based on Moon Shoulder Protocol    Modalities: performed at the physical therapist's discretion within the guidelines of this protocol.    Wound Care: Keep the dressing clean and dry.  Light drainage may occur the first 2 days postop.  You may remove the dressings and get the incision wet in the shower 72 hours after surgery.  DO NOT remove steri-strips or sutures.  DO NOT immerse the incision under water.  Carefully pat the incision dry.  If there is wound drainage, re-apply a fresh dry gauze dressing.    Sling: wear the sling at all times, including sleep, except for hygiene for 6 weeks following surgery. Keep the arm by the side during hygiene. The patient may  remove the sling, keeping the arm by the side to perform gentle wrist and elbow range of motion. The sling may be removed for PT sessions as described below.    PHASE I (Weeks 0-4): passive range of motion       · Begin passive range of motion within 7 days following surgery. Completed 3 times per week       · Begin scapula exercises with the arm in the sling within 7 days following surgery, including scapular elevation, depression, retraction, and protraction. Completed daily Weeks 0-6       · Begin pendulum exercises at home. Completed 2 times per day    PHASE II (Weeks 4-8): active assisted range of motion       · Week 4: lying active assisted motion            o Using a stick or cane while lying flat, the nonsurgical arm moves the injured arm through different motions, including forward flexion, external rotation, and abduction. Completed daily       · Weeks 5: 45-degree active assisted motion            o Using a stick or cane while lying propped at 45 degrees, the nonsurgical arm moves the injured arm through different motions, including forward flexion, external rotation, and abduction. Completed daily       · Weeks 6-8: upright active assisted motion            o Using a stick or cane while sitting up or standing, the nonsurgical arm moves the injured arm through different motions, including forward flexion, external rotation, and abduction. Completed daily       · Week 6: scapula exercises without the sling, including scapular elevation, depression, retraction, and protraction. Completed daily    PHASE III (Weeks 8-12): active motion       · AROM: while sitting up or standing, actively forward flex and abduct the shoulders. Completed daily            o Important not to hike the shoulder up towards the ear.       · Isometric exercises: push and pull a folded towel or pillow into a wall. Completed daily, holding push/pull for 15s with 30s rest in between for 10-15 reps            o Completed with elbow flexed  at 90 degrees and with shoulder internal/external rotation    PHASE IV (Weeks 12-16): resisted exercise       · Begin rotator cuff strengthening using weights and/or elastic bands. Completed 3 times per week for 3-4 sets of 10-15 reps            o Resisted shoulder internal/external rotation            o Resisted deltoid strengthening            o Resisted scapula strengthening            o AVOID doing full can or empty can exercises       · Begin light shoulder stretching            o Hold each stretch for 15s, then rest 15s. Repeat 5 times            o After Week 16, may use aggressive stretching if needed

## 2024-02-20 ENCOUNTER — OFFICE VISIT (OUTPATIENT)
Dept: PHYSICAL THERAPY | Facility: CLINIC | Age: 74
End: 2024-02-20
Payer: MEDICARE

## 2024-02-20 DIAGNOSIS — Z98.890 S/P RIGHT ROTATOR CUFF REPAIR: ICD-10-CM

## 2024-02-20 DIAGNOSIS — M75.121 NONTRAUMATIC COMPLETE TEAR OF RIGHT ROTATOR CUFF: Primary | ICD-10-CM

## 2024-02-20 PROCEDURE — 97110 THERAPEUTIC EXERCISES: CPT

## 2024-02-20 NOTE — PROGRESS NOTES
Daily Note      Today's date: 2024  Patient name: Dominique Varela  : 1950  MRN: [id number]  Referring provider: Karolyn Valdovinos PA-C  Dx:   Encounter Diagnosis     ICD-10-CM    1. Nontraumatic complete tear of right rotator cuff  M75.121       2. S/P right rotator cuff repair  Z98.890           Subjective: Pt reports soreness in the right shoulder from doing her exercises. Pt states finger ladder and cane flexion make her arm sore and tired.        Objective: See treatment diary below    Assessment: Pt tolerated treatment well.  Pt progressed to upright cane flexion and ER and noted fatigue with these exercises, but demonstrates the ability to lift to ~120°. Pt also introduced to pulleys and noted less fatigue with this exercise compared to upright exercises. Pt was educated on the role of gravity and the progression of flexion self-PROM exercises based on difficulty. Pt verbalized understanding.     Plan: Continue per plan of care.  Continue progression of self-flexion PROM.        Insurance:  AMA/CMS Eval/ Re-eval POC expires FOTO Auth Status Co-Insurance   CMS - [facility] 1/15/24 4/15/24  None req No                                        1.  1/15 2.    3.    4.    5.    6.   2   7.   2 8.   2 9.    10.   2/15 RE 11.    12.    13.  14.  15.  16.  17.  18.    19.  20. 21. 22. 23. 24.    25.  26. 27. 28. 29. 30.   31. 32.  33. 34. 35. 36.        Precautions: s/p R rotator cuff repair w/ biceps tenotomy 24       7 8 9 10 (RE-EVAL) 11    Manuals 2/5/24 2/8/24 2/14/24 2/15/24 2/20/24    STM/MFR          Joint mobs                  Neuro Re-Ed         Scap retraction/  protraction 20x 20x 20x 20x 20x     Scap elevation/  depression 20x 20x 20x 20x 20x                                                  Ther Ex         PROM shoulder Flex, ER, abd Flex, ER, abd; HOB elevated to 45° Flex, ER, abd, HOB elevated to 45° Flex, ER, abd HOB elevated to 45° Flex, ER, abd     Pendulums 30/30  30/30 30/30 30/30 30/30     Elbow flex/ext 2x10  2x10 2x10  2x10 2x10     Wrist flex/ext Held  2x10 flex/ext  2x10 pro/sup 2x10 flex/ext  2x10 pro/sup 2x10 flex/ext  2x10 pro/sup 2x10 flex/ext  2x10 pro/sup     squeezes          Cane flexion 10x5s 10x5s at 45° 10x5s at 45° 10x5s at 45° 15x5s standing    Cane ER 10x5s 10x5s at 45° 10x5s at 45° 10x5s at 45° 2x10 (5s) standing    Cane abd     -->    Table slides    2x10 (5s)  Seated PB roll out 2x10 (5s)     Finger ladder    10x  10x     Pulleys     20x5s     Ther Activity         Pt education  Reviewed HEP, reviewed increasing wrist motion, positioning out of sling with pillow  Reviewed progress, reviewed exercises for improving ROM in all planes Reviewed HEP                                                          Modalities         CP R shoulder                     Access Code: [email or web address]  URL: [email or web address]  Date: 02/01/2024  Prepared by: Irvin Abdul    Exercises  - Seated Scapular Retraction  - 2-3 x daily - 7 x weekly - 2 sets - 10 reps - 5 hold  - Seated Shoulder Shrugs  - 2-3 x daily - 7 x weekly - 2 sets - 10 reps - 5 hold  - Circular Shoulder Pendulum with Table Support  - 2-3 x daily - 7 x weekly - 1 sets - 30 reps  - Standing Elbow Flexion Extension AROM  - 2-3 x daily - 7 x weekly - 2 sets - 10 reps - 5 hold  - Supine Shoulder Flexion with Dowel  - 1 x daily - 7 x weekly - 1 sets - 10 reps - 5 hold  - Supine Shoulder External Rotation with Dowel  - 1 x daily - 7 x weekly - 1 sets - 10 reps - 5 hold       Physical Therapy Protocol: Rotator Cuff Repair Immediate Therapy    Based on Moon Shoulder Protocol    Modalities: performed at the physical therapist's discretion within the guidelines of this protocol.    Wound Care: Keep the dressing clean and dry.  Light drainage may occur the first 2 days postop.  You may remove the dressings and get the incision wet in the shower 72 hours after surgery.  DO NOT remove steri-strips or sutures.   DO NOT immerse the incision under water.  Carefully pat the incision dry.  If there is wound drainage, re-apply a fresh dry gauze dressing.    Sling: wear the sling at all times, including sleep, except for hygiene for 6 weeks following surgery. Keep the arm by the side during hygiene. The patient may remove the sling, keeping the arm by the side to perform gentle wrist and elbow range of motion. The sling may be removed for PT sessions as described below.    PHASE I (Weeks 0-4): passive range of motion       · Begin passive range of motion within 7 days following surgery. Completed 3 times per week       · Begin scapula exercises with the arm in the sling within 7 days following surgery, including scapular elevation, depression, retraction, and protraction. Completed daily Weeks 0-6       · Begin pendulum exercises at home. Completed 2 times per day    PHASE II (Weeks 4-8): active assisted range of motion       · Week 4: lying active assisted motion            o Using a stick or cane while lying flat, the nonsurgical arm moves the injured arm through different motions, including forward flexion, external rotation, and abduction. Completed daily       · Weeks 5: 45-degree active assisted motion            o Using a stick or cane while lying propped at 45 degrees, the nonsurgical arm moves the injured arm through different motions, including forward flexion, external rotation, and abduction. Completed daily       · Weeks 6-8: upright active assisted motion            o Using a stick or cane while sitting up or standing, the nonsurgical arm moves the injured arm through different motions, including forward flexion, external rotation, and abduction. Completed daily       · Week 6: scapula exercises without the sling, including scapular elevation, depression, retraction, and protraction. Completed daily    PHASE III (Weeks 8-12): active motion       · AROM: while sitting up or standing, actively forward flex and abduct  the shoulders. Completed daily            o Important not to hike the shoulder up towards the ear.       · Isometric exercises: push and pull a folded towel or pillow into a wall. Completed daily, holding push/pull for 15s with 30s rest in between for 10-15 reps            o Completed with elbow flexed at 90 degrees and with shoulder internal/external rotation    PHASE IV (Weeks 12-16): resisted exercise       · Begin rotator cuff strengthening using weights and/or elastic bands. Completed 3 times per week for 3-4 sets of 10-15 reps            o Resisted shoulder internal/external rotation            o Resisted deltoid strengthening            o Resisted scapula strengthening            o AVOID doing full can or empty can exercises       · Begin light shoulder stretching            o Hold each stretch for 15s, then rest 15s. Repeat 5 times            o After Week 16, may use aggressive stretching if needed

## 2024-02-22 ENCOUNTER — OFFICE VISIT (OUTPATIENT)
Dept: PHYSICAL THERAPY | Facility: CLINIC | Age: 74
End: 2024-02-22
Payer: MEDICARE

## 2024-02-22 DIAGNOSIS — Z98.890 S/P RIGHT ROTATOR CUFF REPAIR: ICD-10-CM

## 2024-02-22 DIAGNOSIS — M75.121 NONTRAUMATIC COMPLETE TEAR OF RIGHT ROTATOR CUFF: Primary | ICD-10-CM

## 2024-02-22 PROCEDURE — 97110 THERAPEUTIC EXERCISES: CPT

## 2024-02-22 PROCEDURE — 97140 MANUAL THERAPY 1/> REGIONS: CPT

## 2024-02-22 NOTE — PROGRESS NOTES
Daily Note      Today's date: 2024  Patient name: Dominique Varela  : 1950  MRN: [id number]  Referring provider: Karolyn Valdovinos PA-C  Dx:   Encounter Diagnosis     ICD-10-CM    1. Nontraumatic complete tear of right rotator cuff  M75.121       2. S/P right rotator cuff repair  Z98.890           Subjective: Pt reports increased pain in the right shoulder blade since this morning. Pt denies any specific mechanism of injury and states she woke up with it. Pt states that she applied heat earlier today. Pt states that she wears her sling to come to PT, but does not wear it much at home and does not wear it while sleeping.       Objective: See treatment diary below; BP L arm start of session = 127/75 mmHg; pt is 7 weeks post-op today.     Assessment: Pt tolerated treatment well.  Pt held from finger ladder this visit due to increased muscular symptoms in the right periscapular region.  Pt was educated that after 6 weeks post-op, she is able to completely wean from the sling and was advised to hold off from wearing the sling coming to next session, as symptoms allow. Pt noted some relief of numbness in digits 1-3 with manual median nerve glide. Symptoms returned while performing self-PROM and PROM with PT, which decreased less with manual median nerve gliding. Pt was instructed in performing this with right elbow/forearm resting on a table as AROM is not permitted yet.     Plan: Continue per plan of care.  Progress treatment per protocol.         Insurance:  AMA/CMS Eval/ Re-eval POC expires FOTO Auth Status Co-Insurance   CMS - [facility] 1/15/24 4/15/24  None req No                                        1.  1/15 2.    3.    4.    5.    6.   2/   7.   2/ 8.   2/8 9.    10.   2/15 RE 11.    12.      13.  14.  15.  16.  17.  18.    19.  20. 21. 22. 23. 24.    25.  26. 27. 28. 29. 30.   31. 32.  33. 34. 35. 36.        Precautions: s/p R rotator cuff repair w/ biceps tenotomy 24       7 8  9 10 (RE-EVAL) 11 12   Manuals 2/5/24 2/8/24 2/14/24 2/15/24 2/20/24 2/22/24   STM/MFR          Joint mobs      Nerve glides median 2x50             Neuro Re-Ed         Scap retraction/  protraction 20x 20x 20x 20x 20x  20x   Scap elevation/  depression 20x 20x 20x 20x 20x  20x                                                Ther Ex         PROM shoulder Flex, ER, abd Flex, ER, abd; HOB elevated to 45° Flex, ER, abd, HOB elevated to 45° Flex, ER, abd HOB elevated to 45° Flex, ER, abd  Flex, ER, abd   Pendulums 30/30 30/30 30/30 30/30 30/30  30/30   Elbow flex/ext 2x10  2x10 2x10  2x10 2x10     Wrist flex/ext Held  2x10 flex/ext  2x10 pro/sup 2x10 flex/ext  2x10 pro/sup 2x10 flex/ext  2x10 pro/sup 2x10 flex/ext  2x10 pro/sup     squeezes          Cane flexion 10x5s 10x5s at 45° 10x5s at 45° 10x5s at 45° 15x5s standing 10x5s seated   Cane ER 10x5s 10x5s at 45° 10x5s at 45° 10x5s at 45° 2x10 (5s) standing 4x *increased numbness   Cane abd     -->    Table slides    2x10 (5s)  Seated PB roll out 2x10 (5s)  20x5s    Finger ladder    10x  10x     Pulleys     20x5s     Ther Activity         Pt education  Reviewed HEP, reviewed increasing wrist motion, positioning out of sling with pillow  Reviewed progress, reviewed exercises for improving ROM in all planes Reviewed HEP                                                          Modalities         CP R shoulder                     Access Code: [email or web address]  URL: [email or web address]  Date: 02/01/2024  Prepared by: Irvin Abdul    Exercises  - Seated Scapular Retraction  - 2-3 x daily - 7 x weekly - 2 sets - 10 reps - 5 hold  - Seated Shoulder Shrugs  - 2-3 x daily - 7 x weekly - 2 sets - 10 reps - 5 hold  - Circular Shoulder Pendulum with Table Support  - 2-3 x daily - 7 x weekly - 1 sets - 30 reps  - Standing Elbow Flexion Extension AROM  - 2-3 x daily - 7 x weekly - 2 sets - 10 reps - 5 hold  - Supine Shoulder Flexion with Dowel  - 1 x daily - 7 x weekly - 1  sets - 10 reps - 5 hold  - Supine Shoulder External Rotation with Dowel  - 1 x daily - 7 x weekly - 1 sets - 10 reps - 5 hold       Physical Therapy Protocol: Rotator Cuff Repair Immediate Therapy    Based on Moon Shoulder Protocol    Modalities: performed at the physical therapist's discretion within the guidelines of this protocol.    Wound Care: Keep the dressing clean and dry.  Light drainage may occur the first 2 days postop.  You may remove the dressings and get the incision wet in the shower 72 hours after surgery.  DO NOT remove steri-strips or sutures.  DO NOT immerse the incision under water.  Carefully pat the incision dry.  If there is wound drainage, re-apply a fresh dry gauze dressing.    Sling: wear the sling at all times, including sleep, except for hygiene for 6 weeks following surgery. Keep the arm by the side during hygiene. The patient may remove the sling, keeping the arm by the side to perform gentle wrist and elbow range of motion. The sling may be removed for PT sessions as described below.    PHASE I (Weeks 0-4): passive range of motion       · Begin passive range of motion within 7 days following surgery. Completed 3 times per week       · Begin scapula exercises with the arm in the sling within 7 days following surgery, including scapular elevation, depression, retraction, and protraction. Completed daily Weeks 0-6       · Begin pendulum exercises at home. Completed 2 times per day    PHASE II (Weeks 4-8): active assisted range of motion       · Week 4: lying active assisted motion            o Using a stick or cane while lying flat, the nonsurgical arm moves the injured arm through different motions, including forward flexion, external rotation, and abduction. Completed daily       · Weeks 5: 45-degree active assisted motion            o Using a stick or cane while lying propped at 45 degrees, the nonsurgical arm moves the injured arm through different motions, including forward flexion,  external rotation, and abduction. Completed daily       · Weeks 6-8: upright active assisted motion            o Using a stick or cane while sitting up or standing, the nonsurgical arm moves the injured arm through different motions, including forward flexion, external rotation, and abduction. Completed daily       · Week 6: scapula exercises without the sling, including scapular elevation, depression, retraction, and protraction. Completed daily    PHASE III (Weeks 8-12): active motion       · AROM: while sitting up or standing, actively forward flex and abduct the shoulders. Completed daily            o Important not to hike the shoulder up towards the ear.       · Isometric exercises: push and pull a folded towel or pillow into a wall. Completed daily, holding push/pull for 15s with 30s rest in between for 10-15 reps            o Completed with elbow flexed at 90 degrees and with shoulder internal/external rotation    PHASE IV (Weeks 12-16): resisted exercise       · Begin rotator cuff strengthening using weights and/or elastic bands. Completed 3 times per week for 3-4 sets of 10-15 reps            o Resisted shoulder internal/external rotation            o Resisted deltoid strengthening            o Resisted scapula strengthening            o AVOID doing full can or empty can exercises       · Begin light shoulder stretching            o Hold each stretch for 15s, then rest 15s. Repeat 5 times            o After Week 16, may use aggressive stretching if needed

## 2024-02-26 ENCOUNTER — OFFICE VISIT (OUTPATIENT)
Dept: OBGYN CLINIC | Facility: CLINIC | Age: 74
End: 2024-02-26

## 2024-02-26 VITALS
HEART RATE: 83 BPM | BODY MASS INDEX: 38.62 KG/M2 | SYSTOLIC BLOOD PRESSURE: 169 MMHG | DIASTOLIC BLOOD PRESSURE: 83 MMHG | WEIGHT: 218 LBS | HEIGHT: 63 IN

## 2024-02-26 DIAGNOSIS — R20.2 NUMBNESS AND TINGLING IN RIGHT HAND: ICD-10-CM

## 2024-02-26 DIAGNOSIS — M25.641 JOINT STIFFNESS OF HAND, RIGHT: ICD-10-CM

## 2024-02-26 DIAGNOSIS — Z98.890 S/P RIGHT ROTATOR CUFF REPAIR: Primary | ICD-10-CM

## 2024-02-26 DIAGNOSIS — R20.0 NUMBNESS AND TINGLING IN RIGHT HAND: ICD-10-CM

## 2024-02-26 DIAGNOSIS — M25.631 STIFFNESS OF RIGHT WRIST JOINT: ICD-10-CM

## 2024-02-26 DIAGNOSIS — M79.89 SWELLING OF RIGHT HAND: ICD-10-CM

## 2024-02-26 PROCEDURE — 99024 POSTOP FOLLOW-UP VISIT: CPT | Performed by: ORTHOPAEDIC SURGERY

## 2024-02-26 NOTE — PROGRESS NOTES
SUBJECTIVE:  Patient is a 73 y.o. year old female who presents for follow up now 7.5 weeks s/p Right shoulder arthroscopic rotator cuff repair, biceps tenotomy, Extensive Debridement - Right performed on  1/4/2024.  Today patient feel she has a pinched nerve in the shoulder.  Over the last few days she has noticed significant pain on the medial border of the scapula and surrounding muscles.  This is limiting her participation in therapy.  It is starting to improve but still hurts.   She had no numbness and tingling in the hand for the last few weeks.  However she noticed she reports ulnar-sided numbness and tingling in her hand over the last few days.  She also feels the hand is more stiff.  Minimal pain in the shoulder itself.      VITALS:  Vitals:    02/26/24 1118   BP: 169/83   Pulse: 83       PHYSICAL EXAMINATION:  General: well developed and well nourished, alert, oriented times 3, and appears comfortable  Psychiatric: Normal    MUSCULOSKELETAL EXAMINATION:    Incision:  well healed   Range of Motion: ER: 30° passively, FF 80° passively  Strength: ER 4/5, IR 4/5  Able to form a composite fist but feels weak doing it  Mild edema   normal coloration of right hand   intact to light touch and pin prick throughout  Brisk cap refill   Palpable radial pulse         PLAN:    I am pleased with how well her range of motion has improved. She may gradually resume driving, so long as her reaction time is appropriate for driving on the road. I recommend she attend hand therapy for her numbness, edema, and stiffness of the hand and wrist. She was amenable to this today. She should continue her efforts at physical therapy, focusing on range of motion. She may take OTC NSAIDs or Tylenol as needed for pain control. She will follow up in 4 weeks.     Scribe Attestation      I,:  Mildred Ramirez am acting as a scribe while in the presence of the attending physician.:       I,:  Everette Keys MD personally performed the  services described in this documentation    as scribed in my presence.:

## 2024-02-27 ENCOUNTER — OFFICE VISIT (OUTPATIENT)
Dept: PHYSICAL THERAPY | Facility: CLINIC | Age: 74
End: 2024-02-27
Payer: MEDICARE

## 2024-02-27 ENCOUNTER — EVALUATION (OUTPATIENT)
Dept: OCCUPATIONAL THERAPY | Facility: CLINIC | Age: 74
End: 2024-02-27
Payer: MEDICARE

## 2024-02-27 DIAGNOSIS — M25.641 JOINT STIFFNESS OF HAND, RIGHT: Primary | ICD-10-CM

## 2024-02-27 DIAGNOSIS — R20.0 NUMBNESS AND TINGLING IN RIGHT HAND: ICD-10-CM

## 2024-02-27 DIAGNOSIS — M75.121 NONTRAUMATIC COMPLETE TEAR OF RIGHT ROTATOR CUFF: Primary | ICD-10-CM

## 2024-02-27 DIAGNOSIS — M79.89 SWELLING OF RIGHT HAND: ICD-10-CM

## 2024-02-27 DIAGNOSIS — Z98.890 S/P RIGHT ROTATOR CUFF REPAIR: ICD-10-CM

## 2024-02-27 DIAGNOSIS — R20.2 NUMBNESS AND TINGLING IN RIGHT HAND: ICD-10-CM

## 2024-02-27 DIAGNOSIS — M25.631 STIFFNESS OF RIGHT WRIST JOINT: ICD-10-CM

## 2024-02-27 PROCEDURE — 97110 THERAPEUTIC EXERCISES: CPT

## 2024-02-27 PROCEDURE — 97166 OT EVAL MOD COMPLEX 45 MIN: CPT

## 2024-02-27 PROCEDURE — 97112 NEUROMUSCULAR REEDUCATION: CPT

## 2024-02-27 NOTE — PROGRESS NOTES
OT Evaluation     Today's date: 2024  Patient name: Dominique Varela  : 1950  MRN: [id number]  Referring provider: Karolyn Valdovinos PA-C  Dx:   Encounter Diagnosis     ICD-10-CM    1. Numbness and tingling in right hand  R20.0 Ambulatory Referral to PT/OT Hand Therapy    R20.2       2. Joint stiffness of hand, right  M25.641 Ambulatory Referral to PT/OT Hand Therapy      3. Stiffness of right wrist joint  M25.631 Ambulatory Referral to PT/OT Hand Therapy      4. Swelling of right hand  M79.89 Ambulatory Referral to PT/OT Hand Therapy                     Assessment  Assessment details: Patient is a 73 y.o. RHD female who presents for OT IE and treatment for Right UE hand stiffness, and numbness that is secondary to RTC repair 24 by Dr. Keys. Patient reports due to wearing a sling and and lack of use in the hand may have attributed to the stiffness. Patient referred by Dr. Keys to initiate treatment including hand therapy.    Impairments: abnormal or restricted ROM, impaired physical strength, lacks appropriate home exercise program and pain with function  Understanding of Dx/Px/POC: good   Prognosis: good    Goals  Short term goals 2-4 weeks  Establish HEP to enhance performance with ADLs.    Improve active range of motion of RUE by 20  to assist with UE dressing.     Achieve composite digital flexion to touch distal montes crease for grasp on utensils.     In crease  strength by 10 lbs. to enhance gripping hand tools.         Long Term goals by discharge  Establish final home exercise program to enhance maximal functional level with ADLs.    Achieve functional active range of motion of RUE for full return to household chores.       Achieve functional strength of RUE for full return to high level ADLs.          Plan  Patient would benefit from: OT eval, skilled occupational therapy, orthotics and custom splinting  Planned modality interventions: thermotherapy: hydrocollator packs, cryotherapy,  TENS, unattended electrical stimulation, ultrasound and electrical stimulation/Russian stimulation  Planned therapy interventions: manual therapy, joint mobilization, strengthening, stretching, therapeutic activities, therapeutic exercise, home exercise program, graded exercise, graded activity, functional ROM exercises, flexibility, orthotic fitting/training, IASTM, kinesiology taping and massage  Frequency: 1-2x week.  Duration in weeks: 10  Plan of Care beginning date: 2024  Plan of Care expiration date: 2024  Treatment plan discussed with: patient      Subjective Evaluation    History of Present Illness  Date of surgery: 2024  Mechanism of injury: surgery  Mechanism of injury: Patient is a 73 y.o. RHD female who presents for OT IE and treatment for Right UE hand stiffness, and numbness that is secondary to RTC repair 24 by Dr. Keys. Patient reports due to wearing a sling and and lack of use in the hand may have attributed to the stiffness. Patient referred by Dr. Keys to initiate treatment including hand therapy.    Pain  Current pain ratin  At best pain ratin  At worst pain ratin  Quality: tight        Objective     Neurological Testing     Sensation     Wrist/Hand   Left   Intact: light touch    Right   Intact: light touch    Active Range of Motion     Left Wrist   Wrist flexion: 60 degrees   Wrist extension: 55 degrees     Right Wrist   Wrist flexion: 40 degrees   Wrist extension: 45 degrees     Left Thumb   Kapandji score: 9 degrees      Right Thumb   Kapandji score: 10 degrees    Additional Active Range of Motion Details  Patient digits able to reach proximal palm at this time. Unable to make tight fist in RUE.     Strength/Myotome Testing     Left Wrist/Hand   Wrist extension: 5  Wrist flexion: 5  Radial deviation: 5  Ulnar deviation: 5     (2nd hand position)     Trial 1: 50    Right Wrist/Hand   Wrist extension: 4-  Wrist flexion: 4-  Radial deviation: 4-  Ulnar  deviation: 4-     (2nd hand position)     Trial 1: 10               Auth Tracker  Auth Status Total   Visits  Expiration date Co-Insurance   pending                                  Visit Tracker  Date 2/27                                                                                        PMHx:   has a past medical history of Breast cyst, GERD (gastroesophageal reflux disease), and Macular degeneration.    Precautions:        Manuals HEP 2/27/2024                       Ther Ex     Education on HEP and dx  x5min   AROM tendon glides x x10   AROM table top extension x x10   AROM digit add/abduction x x10   AROM wrist flex/ext/RD/UD x x10   Sponge   X10 blue   PROM wrist flex/ext x x3 10 sec             Ther Act                     Modalities     MHP  5 min           Assessment:   Patient tolerated session well. Patient demonstrates ROM and strength deficits upon assessment today. Patient session focused on patient education on anatomy and physiology concerning current dx, techniques for decreasing deficits through HEP, and appropriate use of modalities. Patient educated on HEP to include modalities TE/TA  with verbal instructions and handouts for patient reference. Patient educated on treatment plan at this time. Patient benefiting from skilled hand therapy OT to reduce deficits to improve independence with daily activities      Plan:   Focus on ROM and strengthening to improve ability to complete daily activites with ease.  POC 2/27/24 - 5/7/24

## 2024-02-27 NOTE — PROGRESS NOTES
Daily Note      Today's date: 2024  Patient name: Dominique Varela  : 1950  MRN: [id number]  Referring provider: Karolyn Valdovinos PA-C  Dx:   Encounter Diagnosis     ICD-10-CM    1. Nontraumatic complete tear of right rotator cuff  M75.121       2. S/P right rotator cuff repair  Z98.890           Subjective: Pt reports that the pain in her back near the shoulder blade is gone. Pt states that she followed up with orthopedics yesterday and was referred for OT. Pt states she had her evaluation today.        Objective: See treatment diary below    Assessment: Pt tolerated treatment well.  Pt resumed with finger ladder and cane exercises without referred symptoms in the periscapular region. Pt noted fatigue of the right shoulder with these exercises. Pt presents with improved PROM into flexion and ER this visit.    Plan: Continue per plan of care.  Progress treatment as tolerated.            Insurance:  AMA/CMS Eval/ Re-eval POC expires FOTO Auth Status Co-Insurance   CMS - [facility] 1/15/24 4/15/24  None req No                                        1.  1/15 2.    3.    4.    5.    6.   2/   7.   2/ 8.   2/ 9.   2 10.   2/15 RE 11.    12.      13.    14.  15.  16.  17.  18.    19.  20. 21. 22. 23. 24.    25.  26. 27. 28. 29. 30.   31. 32.  33. 34. 35. 36.        Precautions: s/p R rotator cuff repair w/ biceps tenotomy 24       9 10 (RE-EVAL) 11 12 13    Manuals 2/14/24 2/15/24 2/20/24 2/22/24 2/27/24    STM/MFR          Joint mobs    Nerve glides median 2x50               Neuro Re-Ed         Scap retraction/  protraction 20x 20x 20x  20x 20x    Scap elevation/  depression 20x 20x 20x  20x 20x    Shoulder isos                                             Ther Ex         PROM shoulder Flex, ER, abd, HOB elevated to 45° Flex, ER, abd HOB elevated to 45° Flex, ER, abd  Flex, ER, abd Flex, ER, abd    Pendulums 30/30 30/ 30/  30/30     Elbow flex/ext 2x10  2x10 2x10       Wrist  flex/ext 2x10 flex/ext  2x10 pro/sup 2x10 flex/ext  2x10 pro/sup 2x10 flex/ext  2x10 pro/sup       squeezes          Cane flexion 10x5s at 45° 10x5s at 45° 15x5s standing 10x5s seated 15x5s seated     Cane ER 10x5s at 45° 10x5s at 45° 2x10 (5s) standing 4x *increased numbness 15x5s seated    Cane abd   -->      Table slides  2x10 (5s)  Seated PB roll out 2x10 (5s)  20x5s  20x5s     Finger ladder  10x  10x   10x     Pulleys   20x5s   20x5s     Shoulder flexion AROM                  Ther Activity         Pt education  Reviewed progress, reviewed exercises for improving ROM in all planes Reviewed HEP                                                            Modalities         CP R shoulder                     Access Code: [email or web address]  URL: [email or web address]  Date: 02/01/2024  Prepared by: Irvin Abdul    Exercises  - Seated Scapular Retraction  - 2-3 x daily - 7 x weekly - 2 sets - 10 reps - 5 hold  - Seated Shoulder Shrugs  - 2-3 x daily - 7 x weekly - 2 sets - 10 reps - 5 hold  - Circular Shoulder Pendulum with Table Support  - 2-3 x daily - 7 x weekly - 1 sets - 30 reps  - Standing Elbow Flexion Extension AROM  - 2-3 x daily - 7 x weekly - 2 sets - 10 reps - 5 hold  - Supine Shoulder Flexion with Dowel  - 1 x daily - 7 x weekly - 1 sets - 10 reps - 5 hold  - Supine Shoulder External Rotation with Dowel  - 1 x daily - 7 x weekly - 1 sets - 10 reps - 5 hold       Physical Therapy Protocol: Rotator Cuff Repair Immediate Therapy    Based on Moon Shoulder Protocol    Modalities: performed at the physical therapist's discretion within the guidelines of this protocol.    Wound Care: Keep the dressing clean and dry.  Light drainage may occur the first 2 days postop.  You may remove the dressings and get the incision wet in the shower 72 hours after surgery.  DO NOT remove steri-strips or sutures.  DO NOT immerse the incision under water.  Carefully pat the incision dry.  If there is wound drainage,  re-apply a fresh dry gauze dressing.    Sling: wear the sling at all times, including sleep, except for hygiene for 6 weeks following surgery. Keep the arm by the side during hygiene. The patient may remove the sling, keeping the arm by the side to perform gentle wrist and elbow range of motion. The sling may be removed for PT sessions as described below.    PHASE I (Weeks 0-4): passive range of motion       · Begin passive range of motion within 7 days following surgery. Completed 3 times per week       · Begin scapula exercises with the arm in the sling within 7 days following surgery, including scapular elevation, depression, retraction, and protraction. Completed daily Weeks 0-6       · Begin pendulum exercises at home. Completed 2 times per day    PHASE II (Weeks 4-8): active assisted range of motion       · Week 4: lying active assisted motion            o Using a stick or cane while lying flat, the nonsurgical arm moves the injured arm through different motions, including forward flexion, external rotation, and abduction. Completed daily       · Weeks 5: 45-degree active assisted motion            o Using a stick or cane while lying propped at 45 degrees, the nonsurgical arm moves the injured arm through different motions, including forward flexion, external rotation, and abduction. Completed daily       · Weeks 6-8: upright active assisted motion            o Using a stick or cane while sitting up or standing, the nonsurgical arm moves the injured arm through different motions, including forward flexion, external rotation, and abduction. Completed daily       · Week 6: scapula exercises without the sling, including scapular elevation, depression, retraction, and protraction. Completed daily    PHASE III (Weeks 8-12): active motion       · AROM: while sitting up or standing, actively forward flex and abduct the shoulders. Completed daily            o Important not to hike the shoulder up towards the ear.        · Isometric exercises: push and pull a folded towel or pillow into a wall. Completed daily, holding push/pull for 15s with 30s rest in between for 10-15 reps            o Completed with elbow flexed at 90 degrees and with shoulder internal/external rotation    PHASE IV (Weeks 12-16): resisted exercise       · Begin rotator cuff strengthening using weights and/or elastic bands. Completed 3 times per week for 3-4 sets of 10-15 reps            o Resisted shoulder internal/external rotation            o Resisted deltoid strengthening            o Resisted scapula strengthening            o AVOID doing full can or empty can exercises       · Begin light shoulder stretching            o Hold each stretch for 15s, then rest 15s. Repeat 5 times            o After Week 16, may use aggressive stretching if needed

## 2024-02-28 ENCOUNTER — OFFICE VISIT (OUTPATIENT)
Dept: OCCUPATIONAL THERAPY | Facility: CLINIC | Age: 74
End: 2024-02-28
Payer: MEDICARE

## 2024-02-28 ENCOUNTER — OFFICE VISIT (OUTPATIENT)
Dept: PHYSICAL THERAPY | Facility: CLINIC | Age: 74
End: 2024-02-28
Payer: MEDICARE

## 2024-02-28 DIAGNOSIS — Z98.890 S/P RIGHT ROTATOR CUFF REPAIR: ICD-10-CM

## 2024-02-28 DIAGNOSIS — R20.2 NUMBNESS AND TINGLING IN RIGHT HAND: Primary | ICD-10-CM

## 2024-02-28 DIAGNOSIS — M75.121 NONTRAUMATIC COMPLETE TEAR OF RIGHT ROTATOR CUFF: Primary | ICD-10-CM

## 2024-02-28 DIAGNOSIS — R20.0 NUMBNESS AND TINGLING IN RIGHT HAND: Primary | ICD-10-CM

## 2024-02-28 DIAGNOSIS — M25.641 JOINT STIFFNESS OF HAND, RIGHT: ICD-10-CM

## 2024-02-28 DIAGNOSIS — M25.631 STIFFNESS OF RIGHT WRIST JOINT: ICD-10-CM

## 2024-02-28 DIAGNOSIS — M79.89 SWELLING OF RIGHT HAND: ICD-10-CM

## 2024-02-28 PROCEDURE — 97110 THERAPEUTIC EXERCISES: CPT

## 2024-02-28 PROCEDURE — 97530 THERAPEUTIC ACTIVITIES: CPT

## 2024-02-28 NOTE — PROGRESS NOTES
Daily Note     Today's date: 2024  Patient name: Dominique Varela  : 1950  MRN: [id number]  Referring provider: Karolyn Valdovinos PA-C  Dx:   Encounter Diagnosis     ICD-10-CM    1. Numbness and tingling in right hand  R20.0     R20.2       2. Joint stiffness of hand, right  M25.641       3. Swelling of right hand  M79.89       4. Stiffness of right wrist joint  M25.631                      Subjective: Patient notes consistency with HEP.       Objective: See treatment diary below    Auth Tracker  Auth Status Total   Visits  Expiration date Co-Insurance   pending                                  Visit Tracker  Date                                                                                        PMHx:   has a past medical history of Breast cyst, GERD (gastroesophageal reflux disease), and Macular degeneration.    Precautions:        Manuals HEP 2024                        Ther Ex     Education on HEP and dx  x5min   AROM tendon glides x x10   AROM table top extension x x10   AROM digit add/abduction x x10   AROM wrist flex/ext/RD/UD x x10   Sponge   X10 blue   PROM wrist flex/ext x x3 10 sec             Ther Act      Towel scrunch   x5   juxtaciser  x10   Digi flex  X10 green   Flex bar  X10 red             Modalities     MHP  5 min           Assessment:   Patient tolerated session well. Session focused on ROM and gentle strengthening to improve deficits and functional performance with daily activities. Patient tolerated all TE/TA with no complaints. Patient progressing well towards goals. Patient benefiting from skilled hand therapy OT to reduce deficits to improve independence with daily activities.        Plan:   Focus on ROM and strengthening to improve ability to complete daily activites with ease.  POC 24 - 24

## 2024-02-28 NOTE — PROGRESS NOTES
Daily Note      Today's date: 2024  Patient name: Dominique Varela  : 1950  MRN: [id number]  Referring provider: Karolyn Valdovinos PA-C  Dx:   Encounter Diagnosis     ICD-10-CM    1. Nontraumatic complete tear of right rotator cuff  M75.121       2. S/P right rotator cuff repair  Z98.890           Subjective: Pt reports that she has begun hand therapy and finds these exercises challenging. Pt reports numbness in her 3rd digit. Pt states that she slept in an awkward position in her recliner because she found that she slid down when she woke up.        Objective: See treatment diary below    Assessment: Pt tolerated treatment well.  Pt noted less difficulty and less pain with AAROM exercises during today's visit. Pt noted the ability to reach to the 15th notch on the finger ladder without assistance and was able to reach to notch 17 with assistance from contralateral arm. Pt's previous best was 9th notch without assistance. Pt introduced to cane abduction and was limited to ~90° due to tightness and weakness.     Plan: Continue per plan of care.  Progress to AROM phase next visit.           Insurance:  AMA/CMS Eval/ Re-eval POC expires FOTO Auth Status Co-Insurance   CMS - [facility] 1/15/24 4/15/24  None req No                                        1.  1/15 2.   1 3.    4.    5.    6.   2/   7.   2/5 8.   2/8 9.   2 10.   2/15 RE 11.    12.      13.    14.    15.  16.  17.  18.    19.  20. 21. 22. 23. 24.    25.  26. 27. 28. 29. 30.   31. 32.  33. 34. 35. 36.        Precautions: s/p R rotator cuff repair w/ biceps tenotomy 24       9 10 (RE-EVAL) 11 12 13 14   Manuals 2/14/24 2/15/24 2/20/24 2/22/24 2/27/24 2/28/24   STM/MFR          Joint mobs    Nerve glides median 2x50               Neuro Re-Ed         Scap retraction/  protraction 20x 20x 20x  20x 20x 20x    Scap elevation/  depression 20x 20x 20x  20x 20x 20x    Shoulder isos                                              Ther Ex         PROM shoulder Flex, ER, abd, HOB elevated to 45° Flex, ER, abd HOB elevated to 45° Flex, ER, abd  Flex, ER, abd Flex, ER, abd Flex, ER, abd    Pendulums 30/30 30/30 30/30  30/30     Elbow flex/ext 2x10  2x10 2x10       Wrist flex/ext 2x10 flex/ext  2x10 pro/sup 2x10 flex/ext  2x10 pro/sup 2x10 flex/ext  2x10 pro/sup       squeezes          Cane flexion 10x5s at 45° 10x5s at 45° 15x5s standing 10x5s seated 15x5s seated  15x5s seated   Cane ER 10x5s at 45° 10x5s at 45° 2x10 (5s) standing 4x *increased numbness 15x5s seated 15x5s seated   Cane abd   -->   10x5s seated    Table slides  2x10 (5s)  Seated PB roll out 2x10 (5s)  20x5s  20x5s  20x5s    Finger ladder  10x  10x   10x  10x   Pulleys   20x5s   20x5s  20x5s    Shoulder flexion AROM                  Ther Activity         Pt education  Reviewed progress, reviewed exercises for improving ROM in all planes Reviewed HEP                                                            Modalities         CP R shoulder                     Access Code: [email or web address]  URL: [email or web address]  Date: 02/01/2024  Prepared by: Irvin Abdul    Exercises  - Seated Scapular Retraction  - 2-3 x daily - 7 x weekly - 2 sets - 10 reps - 5 hold  - Seated Shoulder Shrugs  - 2-3 x daily - 7 x weekly - 2 sets - 10 reps - 5 hold  - Circular Shoulder Pendulum with Table Support  - 2-3 x daily - 7 x weekly - 1 sets - 30 reps  - Standing Elbow Flexion Extension AROM  - 2-3 x daily - 7 x weekly - 2 sets - 10 reps - 5 hold  - Supine Shoulder Flexion with Dowel  - 1 x daily - 7 x weekly - 1 sets - 10 reps - 5 hold  - Supine Shoulder External Rotation with Dowel  - 1 x daily - 7 x weekly - 1 sets - 10 reps - 5 hold       Physical Therapy Protocol: Rotator Cuff Repair Immediate Therapy    Based on Moon Shoulder Protocol    Modalities: performed at the physical therapist's discretion within the guidelines of this protocol.    Wound Care: Keep the dressing clean and  dry.  Light drainage may occur the first 2 days postop.  You may remove the dressings and get the incision wet in the shower 72 hours after surgery.  DO NOT remove steri-strips or sutures.  DO NOT immerse the incision under water.  Carefully pat the incision dry.  If there is wound drainage, re-apply a fresh dry gauze dressing.    Sling: wear the sling at all times, including sleep, except for hygiene for 6 weeks following surgery. Keep the arm by the side during hygiene. The patient may remove the sling, keeping the arm by the side to perform gentle wrist and elbow range of motion. The sling may be removed for PT sessions as described below.    PHASE I (Weeks 0-4): passive range of motion       · Begin passive range of motion within 7 days following surgery. Completed 3 times per week       · Begin scapula exercises with the arm in the sling within 7 days following surgery, including scapular elevation, depression, retraction, and protraction. Completed daily Weeks 0-6       · Begin pendulum exercises at home. Completed 2 times per day    PHASE II (Weeks 4-8): active assisted range of motion       · Week 4: lying active assisted motion            o Using a stick or cane while lying flat, the nonsurgical arm moves the injured arm through different motions, including forward flexion, external rotation, and abduction. Completed daily       · Weeks 5: 45-degree active assisted motion            o Using a stick or cane while lying propped at 45 degrees, the nonsurgical arm moves the injured arm through different motions, including forward flexion, external rotation, and abduction. Completed daily       · Weeks 6-8: upright active assisted motion            o Using a stick or cane while sitting up or standing, the nonsurgical arm moves the injured arm through different motions, including forward flexion, external rotation, and abduction. Completed daily       · Week 6: scapula exercises without the sling, including  scapular elevation, depression, retraction, and protraction. Completed daily    PHASE III (Weeks 8-12): active motion       · AROM: while sitting up or standing, actively forward flex and abduct the shoulders. Completed daily            o Important not to hike the shoulder up towards the ear.       · Isometric exercises: push and pull a folded towel or pillow into a wall. Completed daily, holding push/pull for 15s with 30s rest in between for 10-15 reps            o Completed with elbow flexed at 90 degrees and with shoulder internal/external rotation    PHASE IV (Weeks 12-16): resisted exercise       · Begin rotator cuff strengthening using weights and/or elastic bands. Completed 3 times per week for 3-4 sets of 10-15 reps            o Resisted shoulder internal/external rotation            o Resisted deltoid strengthening            o Resisted scapula strengthening            o AVOID doing full can or empty can exercises       · Begin light shoulder stretching            o Hold each stretch for 15s, then rest 15s. Repeat 5 times            o After Week 16, may use aggressive stretching if needed

## 2024-02-29 ENCOUNTER — APPOINTMENT (OUTPATIENT)
Dept: PHYSICAL THERAPY | Facility: CLINIC | Age: 74
End: 2024-02-29
Payer: MEDICARE

## 2024-03-05 ENCOUNTER — OFFICE VISIT (OUTPATIENT)
Dept: OCCUPATIONAL THERAPY | Facility: CLINIC | Age: 74
End: 2024-03-05
Payer: MEDICARE

## 2024-03-05 ENCOUNTER — OFFICE VISIT (OUTPATIENT)
Dept: PHYSICAL THERAPY | Facility: CLINIC | Age: 74
End: 2024-03-05
Payer: MEDICARE

## 2024-03-05 DIAGNOSIS — R20.2 NUMBNESS AND TINGLING IN RIGHT HAND: Primary | ICD-10-CM

## 2024-03-05 DIAGNOSIS — M79.89 SWELLING OF RIGHT HAND: ICD-10-CM

## 2024-03-05 DIAGNOSIS — M25.641 JOINT STIFFNESS OF HAND, RIGHT: ICD-10-CM

## 2024-03-05 DIAGNOSIS — R20.0 NUMBNESS AND TINGLING IN RIGHT HAND: Primary | ICD-10-CM

## 2024-03-05 DIAGNOSIS — M75.121 NONTRAUMATIC COMPLETE TEAR OF RIGHT ROTATOR CUFF: Primary | ICD-10-CM

## 2024-03-05 DIAGNOSIS — Z98.890 S/P RIGHT ROTATOR CUFF REPAIR: ICD-10-CM

## 2024-03-05 DIAGNOSIS — M25.631 STIFFNESS OF RIGHT WRIST JOINT: ICD-10-CM

## 2024-03-05 PROCEDURE — 97110 THERAPEUTIC EXERCISES: CPT

## 2024-03-05 PROCEDURE — 97110 THERAPEUTIC EXERCISES: CPT | Performed by: PHYSICAL THERAPIST

## 2024-03-05 NOTE — PROGRESS NOTES
Daily Note      Today's date: 3/5/2024  Patient name: Dominique Varela  : 1950  MRN: [id number]  Referring provider: Karolyn Valdovinos PA-C  Dx:   Encounter Diagnosis     ICD-10-CM    1. Nontraumatic complete tear of right rotator cuff  M75.121       2. S/P right rotator cuff repair  Z98.890           Subjective: Pt is 8 weeks post op at this time. Reports symptoms in hand are improving.      Objective: See treatment diary below    Assessment: Pt tolerated treatment well. Good tolerance to supine AROM flexion at 0 degrees of table incline. Progressed to stand flex AROM to about 60 degrees before shoulder hiking occurs. Added shoulder isometrics to HEP. Difficulty reported with shoulder abduction AAROM cane exercise.     Plan: Continue shoulder isometrics and normalizing shoulder AROM.      Insurance:  A/CMS Eval/ Re-eval POC expires FOTO Auth Status Co-Insurance   CMS - [facility] 1/15/24 4/15/24  None req No                     1.  1/15 2.    3.    4.    5.    6.   2/   7.   2/ 8.   2/8 9.   2 10.   2/15 RE 11.    12.      13.    14.    15.   3/ 16.  17.  18.    19.  20. 21. 22. 23. 24.    25.  26. 27. 28. 29. 30.   31. 32.  33. 34. 35. 36.        Precautions: s/p R rotator cuff repair w/ biceps tenotomy 24     11 12 13 14 15   Manuals 2/20/24 2/22/24 2/27/24 2/28/24 3/5/24   STM/MFR         Joint mobs  Nerve glides median 2x50               Neuro Re-Ed        Scap retraction/  protraction 20x  20x 20x 20x  HEP   Scap elevation/  depression 20x  20x 20x 20x  HEP   Shoulder isos     10*10'' ea - flex, ER, IR           Ther Ex        PROM shoulder Flex, ER, abd  Flex, ER, abd Flex, ER, abd Flex, ER, abd  Performed - FB   Pendulums 30/30  30/30      Cane flexion 15x5s standing 10x5s seated 15x5s seated  15x5s seated Progressed to stand   Cane ER 2x10 (5s) standing 4x *increased numbness 15x5s seated 15x5s seated 2*10 seated AROM   Contralateral arm w/flexion     10x   Cane abd  -->   10x5s seated  2*10 stand   Table slides Seated PB roll out 2x10 (5s)  20x5s  20x5s  20x5s     Finger ladder 10x   10x  10x 10x   Pulleys 20x5s   20x5s  20x5s  20*5''   Supine shld flex AROM     3*10   Stand flexion AROM     10                           Ther Activity        Pt education Reviewed HEP                       Modalities        CP R shoulder                   Access Code: [email or web address]  URL: [email or web address]  Date: 02/01/2024  Prepared by: Irvin Abdul    Exercises  - Seated Scapular Retraction  - 2-3 x daily - 7 x weekly - 2 sets - 10 reps - 5 hold  - Seated Shoulder Shrugs  - 2-3 x daily - 7 x weekly - 2 sets - 10 reps - 5 hold  - Circular Shoulder Pendulum with Table Support  - 2-3 x daily - 7 x weekly - 1 sets - 30 reps  - Standing Elbow Flexion Extension AROM  - 2-3 x daily - 7 x weekly - 2 sets - 10 reps - 5 hold  - Supine Shoulder Flexion with Dowel  - 1 x daily - 7 x weekly - 1 sets - 10 reps - 5 hold  - Supine Shoulder External Rotation with Dowel  - 1 x daily - 7 x weekly - 1 sets - 10 reps - 5 hold       Physical Therapy Protocol: Rotator Cuff Repair Immediate Therapy    Based on Moon Shoulder Protocol    Modalities: performed at the physical therapist's discretion within the guidelines of this protocol.    Wound Care: Keep the dressing clean and dry.  Light drainage may occur the first 2 days postop.  You may remove the dressings and get the incision wet in the shower 72 hours after surgery.  DO NOT remove steri-strips or sutures.  DO NOT immerse the incision under water.  Carefully pat the incision dry.  If there is wound drainage, re-apply a fresh dry gauze dressing.    Sling: wear the sling at all times, including sleep, except for hygiene for 6 weeks following surgery. Keep the arm by the side during hygiene. The patient may remove the sling, keeping the arm by the side to perform gentle wrist and elbow range of motion. The sling may be removed for PT sessions as  described below.    PHASE I (Weeks 0-4): passive range of motion       · Begin passive range of motion within 7 days following surgery. Completed 3 times per week       · Begin scapula exercises with the arm in the sling within 7 days following surgery, including scapular elevation, depression, retraction, and protraction. Completed daily Weeks 0-6       · Begin pendulum exercises at home. Completed 2 times per day    PHASE II (Weeks 4-8): active assisted range of motion       · Week 4: lying active assisted motion            o Using a stick or cane while lying flat, the nonsurgical arm moves the injured arm through different motions, including forward flexion, external rotation, and abduction. Completed daily       · Weeks 5: 45-degree active assisted motion            o Using a stick or cane while lying propped at 45 degrees, the nonsurgical arm moves the injured arm through different motions, including forward flexion, external rotation, and abduction. Completed daily       · Weeks 6-8: upright active assisted motion            o Using a stick or cane while sitting up or standing, the nonsurgical arm moves the injured arm through different motions, including forward flexion, external rotation, and abduction. Completed daily       · Week 6: scapula exercises without the sling, including scapular elevation, depression, retraction, and protraction. Completed daily    PHASE III (Weeks 8-12): active motion       · AROM: while sitting up or standing, actively forward flex and abduct the shoulders. Completed daily            o Important not to hike the shoulder up towards the ear.       · Isometric exercises: push and pull a folded towel or pillow into a wall. Completed daily, holding push/pull for 15s with 30s rest in between for 10-15 reps            o Completed with elbow flexed at 90 degrees and with shoulder internal/external rotation    PHASE IV (Weeks 12-16): resisted exercise       · Begin rotator cuff  strengthening using weights and/or elastic bands. Completed 3 times per week for 3-4 sets of 10-15 reps            o Resisted shoulder internal/external rotation            o Resisted deltoid strengthening            o Resisted scapula strengthening            o AVOID doing full can or empty can exercises       · Begin light shoulder stretching            o Hold each stretch for 15s, then rest 15s. Repeat 5 times            o After Week 16, may use aggressive stretching if needed

## 2024-03-05 NOTE — PROGRESS NOTES
Daily Note     Today's date: 3/5/2024  Patient name: Dominique Varela  : 1950  MRN: [id number]  Referring provider: Karolyn Valdovinos PA-C  Dx:   Encounter Diagnosis     ICD-10-CM    1. Numbness and tingling in right hand  R20.0     R20.2       2. Joint stiffness of hand, right  M25.641       3. Swelling of right hand  M79.89       4. Stiffness of right wrist joint  M25.631                    Subjective: Patient notes consistency with HEP, no pain at start of session.     Objective: See treatment diary below    Auth Tracker  Auth Status Total   Visits  Expiration date Co-Insurance   pending                                  Visit Tracker  Date  3                                                                                  PMHx:   has a past medical history of Breast cyst, GERD (gastroesophageal reflux disease), and Macular degeneration.    Precautions:     Manuals HEP 3/5/2024                        Ther Ex     Education on HEP and dx  x5min   AROM tendon glides x x10   AROM table top extension x x10   AROM digit add/abduction x x10   AROM wrist flex/ext/RD/UD x x10   Sponge   X10 blue   PROM wrist flex/ext x x3 10 sec   Wrist flexor stretch   5x5 sec        Ther Act      Towel scrunch   x5   juxtaciser  x10   Digi flex  2X10 green   Flex bar  X10 red   Sponges grasp and release   Groups of 5, whole bin   Digital extension   Rubberband x10 red        Modalities     MHP  x5 min           Assessment:   Patient tolerated session well. Session focused on ROM and gentle strengthening to improve deficits and functional performance with daily activities. Patient tolerated all TE/TA with minimal complaints. Patient progressing well towards goals. Patient benefiting from skilled hand therapy OT to reduce deficits to improve independence with daily activities.        Plan:   Focus on ROM and strengthening to improve ability to complete daily activites with ease.  POC 24 - 24

## 2024-03-07 ENCOUNTER — OFFICE VISIT (OUTPATIENT)
Dept: OCCUPATIONAL THERAPY | Facility: CLINIC | Age: 74
End: 2024-03-07
Payer: MEDICARE

## 2024-03-07 ENCOUNTER — OFFICE VISIT (OUTPATIENT)
Dept: PHYSICAL THERAPY | Facility: CLINIC | Age: 74
End: 2024-03-07
Payer: MEDICARE

## 2024-03-07 DIAGNOSIS — M25.641 JOINT STIFFNESS OF HAND, RIGHT: ICD-10-CM

## 2024-03-07 DIAGNOSIS — M79.89 SWELLING OF RIGHT HAND: ICD-10-CM

## 2024-03-07 DIAGNOSIS — R20.0 NUMBNESS AND TINGLING IN RIGHT HAND: Primary | ICD-10-CM

## 2024-03-07 DIAGNOSIS — M75.121 NONTRAUMATIC COMPLETE TEAR OF RIGHT ROTATOR CUFF: Primary | ICD-10-CM

## 2024-03-07 DIAGNOSIS — M25.631 STIFFNESS OF RIGHT WRIST JOINT: ICD-10-CM

## 2024-03-07 DIAGNOSIS — R20.2 NUMBNESS AND TINGLING IN RIGHT HAND: Primary | ICD-10-CM

## 2024-03-07 DIAGNOSIS — Z98.890 S/P RIGHT ROTATOR CUFF REPAIR: ICD-10-CM

## 2024-03-07 PROCEDURE — 97110 THERAPEUTIC EXERCISES: CPT

## 2024-03-07 PROCEDURE — 97530 THERAPEUTIC ACTIVITIES: CPT

## 2024-03-07 PROCEDURE — 97112 NEUROMUSCULAR REEDUCATION: CPT

## 2024-03-07 NOTE — PROGRESS NOTES
Daily Note     Today's date: 3/7/2024  Patient name: Dominique Varela  : 1950  MRN: [id number]  Referring provider: Karolyn Valdovinos PA-C  Dx:   Encounter Diagnosis     ICD-10-CM    1. Numbness and tingling in right hand  R20.0     R20.2       2. Swelling of right hand  M79.89       3. Stiffness of right wrist joint  M25.631       4. Joint stiffness of hand, right  M25.641                    Subjective: Patient notes consistency with HEP, no pain at start of session.     Objective: See treatment diary below    Auth Tracker  Auth Status Total   Visits  Expiration date Co-Insurance   pending                                  Visit Tracker  Date 2/27 2/28 3/5 3/7                                                                                 PMHx:   has a past medical history of Breast cyst, GERD (gastroesophageal reflux disease), and Macular degeneration.    Precautions:     Manuals HEP 3/7/2024                        Ther Ex     Education on HEP and dx  x5min   AROM tendon glides x x10   AROM table top extension x x10   AROM digit add/abduction x x10   AROM wrist flex/ext/RD/UD x x10   Sponge   X10 blue   PROM wrist flex/ext x x3 10 sec   Wrist flexor stretch   5x5 sec        Ther Act      Towel scrunch   x5   juxtaciser  x10   Digi flex  2X10 green   Flex bar  X10 red   Sponges grasp and release   Groups of 5, whole bin   Digital extension   Rubberband x10 red        Modalities     MHP  x5 min           Assessment:   Patient tolerated session well. Session focused on ROM and gentle strengthening to improve deficits and functional performance with daily activities. Patient tolerated all TE/TA with minimal complaints. Patient progressing well towards goals. Patient benefiting from skilled hand therapy OT to reduce deficits to improve independence with daily activities.        Plan:   Focus on ROM and strengthening to improve ability to complete daily activites with ease.  POC 24 - 24

## 2024-03-07 NOTE — PROGRESS NOTES
Daily Note      Today's date: 3/7/2024  Patient name: Dominique Varela  : 1950  MRN: [id number]  Referring provider: Karolyn Valdovinos PA-C  Dx:   Encounter Diagnosis     ICD-10-CM    1. Nontraumatic complete tear of right rotator cuff  M75.121       2. S/P right rotator cuff repair  Z98.890           Subjective: Pt reports increased soreness in the right shoulder without specific change in activity. Pt states that she feels rain has affected her shoulder soreness.        Objective: See treatment diary below    Assessment: Pt tolerated treatment well.  Pt noted fatigue with AROM exercises and continues to note difficulty with shoulder abduction with cane. Goals of the current stage of the protocol were reviewed. Pt requires moderate cuing for setup of isometric exercises.     Plan: Continue per plan of care.           Insurance:  A/CMS Eval/ Re-eval POC expires FOTO Auth Status Co-Insurance   CMS - [facility] 1/15/24 4/15/24  None req No                     1.  1/15 2.    3.    4.    5.    6.   2/   7.   2 8.   2/8 9.   2 10.   2/15 RE 11.    12.      13.    14.    15.   3 16.   3 17.  18.    19.  20. 21. 22. 23. 24.    25.  26. 27. 28. 29. 30.   31. 32.  33. 34. 35. 36.        Precautions: s/p R rotator cuff repair w/ biceps tenotomy 24     11 12 13 14 15 16   Manuals 2/20/24 2/22/24 2/27/24 2/28/24 3/5/24 3/7/24   STM/MFR          Joint mobs  Nerve glides median 2x50                 Neuro Re-Ed         Scap retraction/  protraction 20x  20x 20x 20x  HEP    Scap elevation/  depression 20x  20x 20x 20x  HEP    Shoulder isos     10*10'' ea - flex, ER, IR 10x10s ea., flex, ER, IR            Ther Ex         PROM shoulder Flex, ER, abd  Flex, ER, abd Flex, ER, abd Flex, ER, abd  Performed - FB Flex, ER, abd   Pendulums 30/30  30/       Cane flexion 15x5s standing 10x5s seated 15x5s seated  15x5s seated Progressed to stand    Cane ER 2x10 (5s) standing 4x *increased numbness  15x5s seated 15x5s seated 2*10 seated AROM 3x10 seated AROM   Contralateral arm w/flexion     10x 15x5s standing   Cane abd -->   10x5s seated  2*10 stand 2x10 standing   Table slides Seated PB roll out 2x10 (5s)  20x5s  20x5s  20x5s      Finger ladder 10x   10x  10x 10x    Pulleys 20x5s   20x5s  20x5s  20*5'' 20x5s flex, abd   Supine shld flex AROM     3*10 3x10   Stand flexion AROM     10 10x                              Ther Activity         Pt education Reviewed HEP                          Modalities         CP R shoulder                     Access Code: [email or web address]  URL: [email or web address]  Date: 02/01/2024  Prepared by: Irvin Abdul    Exercises  - Seated Scapular Retraction  - 2-3 x daily - 7 x weekly - 2 sets - 10 reps - 5 hold  - Seated Shoulder Shrugs  - 2-3 x daily - 7 x weekly - 2 sets - 10 reps - 5 hold  - Circular Shoulder Pendulum with Table Support  - 2-3 x daily - 7 x weekly - 1 sets - 30 reps  - Standing Elbow Flexion Extension AROM  - 2-3 x daily - 7 x weekly - 2 sets - 10 reps - 5 hold  - Supine Shoulder Flexion with Dowel  - 1 x daily - 7 x weekly - 1 sets - 10 reps - 5 hold  - Supine Shoulder External Rotation with Dowel  - 1 x daily - 7 x weekly - 1 sets - 10 reps - 5 hold       Physical Therapy Protocol: Rotator Cuff Repair Immediate Therapy    Based on Moon Shoulder Protocol    Modalities: performed at the physical therapist's discretion within the guidelines of this protocol.    Wound Care: Keep the dressing clean and dry.  Light drainage may occur the first 2 days postop.  You may remove the dressings and get the incision wet in the shower 72 hours after surgery.  DO NOT remove steri-strips or sutures.  DO NOT immerse the incision under water.  Carefully pat the incision dry.  If there is wound drainage, re-apply a fresh dry gauze dressing.    Sling: wear the sling at all times, including sleep, except for hygiene for 6 weeks following surgery. Keep the arm by the side  during hygiene. The patient may remove the sling, keeping the arm by the side to perform gentle wrist and elbow range of motion. The sling may be removed for PT sessions as described below.    PHASE I (Weeks 0-4): passive range of motion       · Begin passive range of motion within 7 days following surgery. Completed 3 times per week       · Begin scapula exercises with the arm in the sling within 7 days following surgery, including scapular elevation, depression, retraction, and protraction. Completed daily Weeks 0-6       · Begin pendulum exercises at home. Completed 2 times per day    PHASE II (Weeks 4-8): active assisted range of motion       · Week 4: lying active assisted motion            o Using a stick or cane while lying flat, the nonsurgical arm moves the injured arm through different motions, including forward flexion, external rotation, and abduction. Completed daily       · Weeks 5: 45-degree active assisted motion            o Using a stick or cane while lying propped at 45 degrees, the nonsurgical arm moves the injured arm through different motions, including forward flexion, external rotation, and abduction. Completed daily       · Weeks 6-8: upright active assisted motion            o Using a stick or cane while sitting up or standing, the nonsurgical arm moves the injured arm through different motions, including forward flexion, external rotation, and abduction. Completed daily       · Week 6: scapula exercises without the sling, including scapular elevation, depression, retraction, and protraction. Completed daily    PHASE III (Weeks 8-12): active motion       · AROM: while sitting up or standing, actively forward flex and abduct the shoulders. Completed daily            o Important not to hike the shoulder up towards the ear.       · Isometric exercises: push and pull a folded towel or pillow into a wall. Completed daily, holding push/pull for 15s with 30s rest in between for 10-15 reps             o Completed with elbow flexed at 90 degrees and with shoulder internal/external rotation    PHASE IV (Weeks 12-16): resisted exercise       · Begin rotator cuff strengthening using weights and/or elastic bands. Completed 3 times per week for 3-4 sets of 10-15 reps            o Resisted shoulder internal/external rotation            o Resisted deltoid strengthening            o Resisted scapula strengthening            o AVOID doing full can or empty can exercises       · Begin light shoulder stretching            o Hold each stretch for 15s, then rest 15s. Repeat 5 times            o After Week 16, may use aggressive stretching if needed

## 2024-03-12 ENCOUNTER — OFFICE VISIT (OUTPATIENT)
Dept: PHYSICAL THERAPY | Facility: CLINIC | Age: 74
End: 2024-03-12
Payer: MEDICARE

## 2024-03-12 ENCOUNTER — OFFICE VISIT (OUTPATIENT)
Dept: OCCUPATIONAL THERAPY | Facility: CLINIC | Age: 74
End: 2024-03-12
Payer: MEDICARE

## 2024-03-12 DIAGNOSIS — M79.89 SWELLING OF RIGHT HAND: ICD-10-CM

## 2024-03-12 DIAGNOSIS — Z98.890 S/P RIGHT ROTATOR CUFF REPAIR: ICD-10-CM

## 2024-03-12 DIAGNOSIS — R20.2 NUMBNESS AND TINGLING IN RIGHT HAND: Primary | ICD-10-CM

## 2024-03-12 DIAGNOSIS — M25.631 STIFFNESS OF RIGHT WRIST JOINT: ICD-10-CM

## 2024-03-12 DIAGNOSIS — M25.641 JOINT STIFFNESS OF HAND, RIGHT: ICD-10-CM

## 2024-03-12 DIAGNOSIS — R20.0 NUMBNESS AND TINGLING IN RIGHT HAND: Primary | ICD-10-CM

## 2024-03-12 DIAGNOSIS — M75.121 NONTRAUMATIC COMPLETE TEAR OF RIGHT ROTATOR CUFF: Primary | ICD-10-CM

## 2024-03-12 PROCEDURE — 97140 MANUAL THERAPY 1/> REGIONS: CPT

## 2024-03-12 PROCEDURE — 97110 THERAPEUTIC EXERCISES: CPT

## 2024-03-12 PROCEDURE — 97112 NEUROMUSCULAR REEDUCATION: CPT

## 2024-03-12 NOTE — PROGRESS NOTES
Daily Note      Today's date: 3/12/2024  Patient name: Dominique Varela  : 1950  MRN: [id number]  Referring provider: Karolyn Valdovinos PA-C  Dx:   Encounter Diagnosis     ICD-10-CM    1. Nontraumatic complete tear of right rotator cuff  M75.121       2. S/P right rotator cuff repair  Z98.890           Subjective: Pt reports that she has had soreness throughout the right shoulder over the weekend. Pt attributes this to the weather. Pt states that one day, she fell asleep on the couch and her right hand felt a little better as a result.        Objective: See treatment diary below    Assessment: Pt tolerated treatment well.  Pt introduced to cervical retractions and noted no effect on hand pain or mobility, however had no pain at baseline. Pt noted that stiffness in the hand to make a fist still persisted.  Performed ULTT and pt presented with (+) median nerve tension, mildly (+) radial nerve tension, and (-) ulnar nerve tension. Pt noted tension in the right biceps with ULTT1 and in the posterior shoulder with ULTT2A.     Plan: Continue per plan of care.  Progress treatment as tolerated.            Insurance:  AMA/CMS Eval/ Re-eval POC expires FOTO Auth Status Co-Insurance   CMS - [facility] 1/15/24 4/15/24  None req No    2/15/24 4/15/24                1.  1/15 2.   1 3.    4.    5.   1 6.   2/   7.   2/ 8.   2/8 9.   2 10.   2/15 RE 11.    12.      13.   2 14.   2 15.   3/ 16.   3/7 17.   3/ 18.    19.  20. 21. 22. 23. 24.    25.  26. 27. 28. 29. 30.   31. 32.  33. 34. 35. 36.        Precautions: s/p R rotator cuff repair w/ biceps tenotomy 24     13 14 15 16 17    Manuals 2/27/24 2/28/24 3/5/24 3/7/24 3/12/24    STM/MFR          Joint mobs         Neuromobility         Neuro Re-Ed         Cervical retraction     2x10 seated     Scap retraction/  protraction 20x 20x  HEP      Scap elevation/  depression 20x 20x  HEP      Shoulder isos   10*10'' ea - flex, ER, IR 10x10s ea., flex,  ER, IR 10x10s ea.              Ther Ex         PROM shoulder Flex, ER, abd Flex, ER, abd  Performed - FB Flex, ER, abd Flex, ER, abd    Pendulums         Cane flexion 15x5s seated  15x5s seated Progressed to stand      Cane ER 15x5s seated 15x5s seated 2*10 seated AROM 3x10 seated AROM 2x10 seated AAROM    Contralateral arm w/flexion   10x 15x5s standing 15x5s    Cane abd  10x5s seated  2*10 stand 2x10 standing 2x10 standing    Table slides 20x5s  20x5s        Finger ladder 10x  10x 10x      Pulleys 20x5s  20x5s  20*5'' 20x5s flex, abd 20x5s flex, abd    Supine shld flex AROM   3*10 3x10 2x10    Stand flexion AROM   10 10x 15x                               Ther Activity         Pt education                           Modalities         CP R shoulder                     Access Code: [email or web address]  URL: [email or web address]  Date: 02/01/2024  Prepared by: Irvin Abdul    Exercises  - Seated Scapular Retraction  - 2-3 x daily - 7 x weekly - 2 sets - 10 reps - 5 hold  - Seated Shoulder Shrugs  - 2-3 x daily - 7 x weekly - 2 sets - 10 reps - 5 hold  - Circular Shoulder Pendulum with Table Support  - 2-3 x daily - 7 x weekly - 1 sets - 30 reps  - Standing Elbow Flexion Extension AROM  - 2-3 x daily - 7 x weekly - 2 sets - 10 reps - 5 hold  - Supine Shoulder Flexion with Dowel  - 1 x daily - 7 x weekly - 1 sets - 10 reps - 5 hold  - Supine Shoulder External Rotation with Dowel  - 1 x daily - 7 x weekly - 1 sets - 10 reps - 5 hold       Physical Therapy Protocol: Rotator Cuff Repair Immediate Therapy    Based on Moon Shoulder Protocol    Modalities: performed at the physical therapist's discretion within the guidelines of this protocol.    Wound Care: Keep the dressing clean and dry.  Light drainage may occur the first 2 days postop.  You may remove the dressings and get the incision wet in the shower 72 hours after surgery.  DO NOT remove steri-strips or sutures.  DO NOT immerse the incision under water.   Carefully pat the incision dry.  If there is wound drainage, re-apply a fresh dry gauze dressing.    Sling: wear the sling at all times, including sleep, except for hygiene for 6 weeks following surgery. Keep the arm by the side during hygiene. The patient may remove the sling, keeping the arm by the side to perform gentle wrist and elbow range of motion. The sling may be removed for PT sessions as described below.    PHASE I (Weeks 0-4): passive range of motion       · Begin passive range of motion within 7 days following surgery. Completed 3 times per week       · Begin scapula exercises with the arm in the sling within 7 days following surgery, including scapular elevation, depression, retraction, and protraction. Completed daily Weeks 0-6       · Begin pendulum exercises at home. Completed 2 times per day    PHASE II (Weeks 4-8): active assisted range of motion       · Week 4: lying active assisted motion            o Using a stick or cane while lying flat, the nonsurgical arm moves the injured arm through different motions, including forward flexion, external rotation, and abduction. Completed daily       · Weeks 5: 45-degree active assisted motion            o Using a stick or cane while lying propped at 45 degrees, the nonsurgical arm moves the injured arm through different motions, including forward flexion, external rotation, and abduction. Completed daily       · Weeks 6-8: upright active assisted motion            o Using a stick or cane while sitting up or standing, the nonsurgical arm moves the injured arm through different motions, including forward flexion, external rotation, and abduction. Completed daily       · Week 6: scapula exercises without the sling, including scapular elevation, depression, retraction, and protraction. Completed daily    PHASE III (Weeks 8-12): active motion       · AROM: while sitting up or standing, actively forward flex and abduct the shoulders. Completed daily             o Important not to hike the shoulder up towards the ear.       · Isometric exercises: push and pull a folded towel or pillow into a wall. Completed daily, holding push/pull for 15s with 30s rest in between for 10-15 reps            o Completed with elbow flexed at 90 degrees and with shoulder internal/external rotation    PHASE IV (Weeks 12-16): resisted exercise       · Begin rotator cuff strengthening using weights and/or elastic bands. Completed 3 times per week for 3-4 sets of 10-15 reps            o Resisted shoulder internal/external rotation            o Resisted deltoid strengthening            o Resisted scapula strengthening            o AVOID doing full can or empty can exercises       · Begin light shoulder stretching            o Hold each stretch for 15s, then rest 15s. Repeat 5 times            o After Week 16, may use aggressive stretching if needed

## 2024-03-12 NOTE — PROGRESS NOTES
Daily Note     Today's date: 3/12/2024  Patient name: Dominique Varela  : 1950  MRN: [id number]  Referring provider: Karolyn Valdovinos PA-C  Dx:   Encounter Diagnosis     ICD-10-CM    1. Numbness and tingling in right hand  R20.0     R20.2       2. Swelling of right hand  M79.89       3. Stiffness of right wrist joint  M25.631       4. Joint stiffness of hand, right  M25.641                    Subjective: Patient notes consistency with HEP, no pain at start of session.     Objective: See treatment diary below    Auth Tracker  Auth Status Total   Visits  Expiration date Co-Insurance   pending                                  Visit Tracker  Date 2/27 2/28 3/5 3/7 3/12                                                                                PMHx:   has a past medical history of Breast cyst, GERD (gastroesophageal reflux disease), and Macular degeneration.    Precautions:     Manuals HEP 3/12/2024                        Ther Ex     Education on HEP and dx  x5min   AROM tendon glides x x10   AROM table top extension x x10   AROM digit add/abduction x x10   AROM wrist flex/ext/RD/UD x x10   Sponge   X10 blue   PROM wrist flex/ext x x3 10 sec   Wrist flexor stretch   5x5 sec        Ther Act      Towel scrunch   x5   juxtaciser  x10   Digi flex  2X10 green   Flex bar  X10 red   Sponges grasp and release   Groups of 5, whole bin   Digital extension   Rubberband x10 red        Modalities     MHP  x5 min           Assessment:   Patient tolerated session well. Session focused on ROM and gentle strengthening to improve deficits and functional performance with daily activities. Patient tolerated all TE/TA with minimal complaints. Patient progressing well towards goals. Patient benefiting from skilled hand therapy OT to reduce deficits to improve independence with daily activities.        Plan:   Focus on ROM and strengthening to improve ability to complete daily activites with ease.  POC 24 - 24

## 2024-03-14 ENCOUNTER — OFFICE VISIT (OUTPATIENT)
Dept: OCCUPATIONAL THERAPY | Facility: CLINIC | Age: 74
End: 2024-03-14
Payer: MEDICARE

## 2024-03-14 ENCOUNTER — EVALUATION (OUTPATIENT)
Dept: PHYSICAL THERAPY | Facility: CLINIC | Age: 74
End: 2024-03-14
Payer: MEDICARE

## 2024-03-14 DIAGNOSIS — R20.2 NUMBNESS AND TINGLING IN RIGHT HAND: Primary | ICD-10-CM

## 2024-03-14 DIAGNOSIS — M79.89 SWELLING OF RIGHT HAND: ICD-10-CM

## 2024-03-14 DIAGNOSIS — Z98.890 S/P RIGHT ROTATOR CUFF REPAIR: ICD-10-CM

## 2024-03-14 DIAGNOSIS — R20.0 NUMBNESS AND TINGLING IN RIGHT HAND: Primary | ICD-10-CM

## 2024-03-14 DIAGNOSIS — M25.641 JOINT STIFFNESS OF HAND, RIGHT: ICD-10-CM

## 2024-03-14 DIAGNOSIS — M25.631 STIFFNESS OF RIGHT WRIST JOINT: ICD-10-CM

## 2024-03-14 DIAGNOSIS — M75.121 NONTRAUMATIC COMPLETE TEAR OF RIGHT ROTATOR CUFF: Primary | ICD-10-CM

## 2024-03-14 PROCEDURE — 97530 THERAPEUTIC ACTIVITIES: CPT

## 2024-03-14 PROCEDURE — 97110 THERAPEUTIC EXERCISES: CPT

## 2024-03-14 PROCEDURE — 97112 NEUROMUSCULAR REEDUCATION: CPT

## 2024-03-14 NOTE — PROGRESS NOTES
Daily Note     Today's date: 3/14/2024  Patient name: Dominique Varela  : 1950  MRN: [id number]  Referring provider: Karolyn Valdovinos PA-C  Dx:   Encounter Diagnosis     ICD-10-CM    1. Numbness and tingling in right hand  R20.0     R20.2       2. Swelling of right hand  M79.89       3. Stiffness of right wrist joint  M25.631       4. Joint stiffness of hand, right  M25.641                    Subjective: Patient notes consistency with HEP, no pain at start of session.     Objective: See treatment diary below    Auth Tracker  Auth Status Total   Visits  Expiration date Co-Insurance   pending                                  Visit Tracker  Date 2/27 2/28 3/5 3/7 3/12 3/14                                                                               PMHx:   has a past medical history of Breast cyst, GERD (gastroesophageal reflux disease), and Macular degeneration.    Precautions:     Manuals HEP 3/14/2024                        Ther Ex     Education on HEP and dx  x5min   AROM tendon glides x x10   AROM table top extension x x10   AROM digit add/abduction x x10   AROM wrist flex/ext/RD/UD x x10   Sponge   X10 blue   PROM wrist flex/ext x x3 10 sec   Wrist flexor stretch   5x5 sec        Ther Act      Towel scrunch   x5   juxtaciser  x10   Digi flex  2X10 green   Flex bar  X10 red   Sponges grasp and release   Groups of 5, whole bin   Digital extension   Rubberband x10 red        Modalities     MHP  x5 min           Assessment:   Patient tolerated session well. Session focused on ROM and gentle strengthening to improve deficits and functional performance with daily activities. Patient tolerated all TE/TA with minimal complaints. Patient progressing well towards goals. Patient benefiting from skilled hand therapy OT to reduce deficits to improve independence with daily activities.        Plan:   Focus on ROM and strengthening to improve ability to complete daily activites with ease.  POC 24 - 24

## 2024-03-14 NOTE — PROGRESS NOTES
PT Re-Evaluation     Today's date: 3/14/2024  Patient name: Dominique Varela  : 1950  MRN: [id number]  Referring provider: Karolyn Valdovinos PA-C  Dx:   Encounter Diagnosis     ICD-10-CM    1. Nontraumatic complete tear of right rotator cuff  M75.121       2. S/P right rotator cuff repair  Z98.890                      Assessment  Assessment details: Dominique Varela has been seen in hospital-based outpatient PT for 18 visits s/p right rotator cuff repair with biceps tenotomy, which occurred on 24. Patient presents with the following improvements noted: decreased right shoulder pain and improved R shoulder ROM. Patient continues to present with the following impairments: right shoulder pain, limited right shoulder P/AAROM, limited right UE strength, and postural dysfunction. Due to these impairments, patient continues to have difficulty performing the following: ADL's, lifting/carrying, reaching overhead, self-care activities, bed mobility, sidelying right, putting on/taking off shoes/socks, household chores, shopping, and sleeping. Patient has been educated in updated plan of care. Patient would benefit from continued skilled physical therapy services to address the above functional limitations and progress towards prior level of function and independence with home exercise program.  Impairments: abnormal muscle firing, abnormal or restricted ROM, activity intolerance, impaired physical strength, lacks appropriate home exercise program, pain with function, scapular dyskinesis, weight-bearing intolerance, poor posture  and poor body mechanics  Understanding of Dx/Px/POC: good   Prognosis: good    Goals  Short Term Goals [3 weeks; target date: 2/15/24]  1. Patient will be independent with initial HEP. - goal met  2. Patient will demonstrate an increase in right shoulder flexion PROM to 130°. - goal met    Long Term Goals [6 weeks; target date: 4/15/24]  1. Patient will be independent with comprehensive HEP.  - in  progress  2. Patient will demonstrate an increase in right shoulder AROM to WNL in order to promote bathing/dressing without pain. - in progress  3. Patient will demonstrate an increase in right shoulder strength to at least 4/5 on MMT in order to promote pain-free carrying/lifting. - in progress  4. Patient will be able to place a 2 lb. weight on a shelf above shoulder height with proper scapulohumeral mechanics. - in progress  5. Patient will be able to perform ADLs with right shoulder pain of 2/10 at worst. - in progress    Plan  Patient would benefit from: skilled physical therapy  Planned modality interventions: cryotherapy, electrical stimulation/Russian stimulation, TENS, ultrasound, high voltage pulsed current: pain management, thermotherapy: hydrocollator packs, unattended electrical stimulation and high voltage pulsed current: spasm management  Planned therapy interventions: flexibility, functional ROM exercises, graded exercise, home exercise program, joint mobilization, manual therapy, neuromuscular re-education, patient education, strengthening, stretching, therapeutic exercise, therapeutic activities, activity modification, postural training, body mechanics training, graded activity, self care, muscle pump exercises, abdominal trunk stabilization, ADL retraining, ADL training, IADL retraining, breathing training, behavior modification, therapeutic training, transfer training and Hernandez taping  Duration in weeks: 6  Plan of Care beginning date: 3/14/2024  Plan of Care expiration date: 4/30/2024  Treatment plan discussed with: patient    Subjective Evaluation    History of Present Illness  Date of surgery: 1/4/2024  Mechanism of injury: (Information regarding KIMBERLY was taken from evaluation at this facility on 10/16/23.)     Pt reports R shoulder pain over the past 3 weeks, which she attributes to moving and pushing boxes around her home as she is trying to get rid of things around her home. Pt states  that while pushing with her arm to get up from her sectional couch, she has pain in the shoulder. Pt states that reaching back will sometimes cause popping and mild pain. Pt denies difficulty with getting dressed, bathing, or getting ready. Pt states she had an x-ray taken when she saw orthopedics, which was normal. Pt was referred to PT.     (1/15/24) Pt attended PT at this facility from 2023 - 2023 and stopped to pursue further testing with orthopedics. An MRI in 2023 revealed supraspinatus tear, low-grade infraspinatus and subscapularis tear, and medially subluxed biceps long head tendon as per MRI report. Pt elected to undergo RTC repair surgery on 24 and is doing well overall. Pt states that she has been able to shower with the help of her daughter. Pt states that resting her arm at her side while showering makes the shoulder feel achy, but otherwise pain has been improving overall.     (2/15/24) Pt reports good progress since starting PT. Pt notes improvements in pain, ROM, and use of the right arm. Pt states she is better able to button her shirt, which she was not able to do a month ago. Pt states that she is still limited in reaching up, reaching behind the back, and in the strength of the right arm.      (3/14/24) Pt reports continued progress during PT. Pt states that pain and ROM have improved. Pt states she is able to reach back further, but still has trouble reaching to the center of her back. Pt states reaching to the side is most limited and is improving with reaching forward and up. Pt states she still needs to gain strength.   Patient Goals  Patient goal: To get back to normal  Pain  Current pain ratin  At best pain ratin  At worst pain ratin  Location: R shoulder  Quality: dull ache  Relieving factors: ice and medications  Exacerbated by: Resting with arm down.  Progression: improved    Social Support  Lives with: Son; daughter is available to come  2x/week to help with bathing.    Employment status: not working  Hand dominance: left  Exercise history: Walking 2x/week      Diagnostic Tests  MRI studies: abnormal  Treatments  Previous treatment: physical therapy      Objective     Postural Observations    Additional Postural Observation Details  Slight forward head, rounded shoulder posture     Active Range of Motion     Right Shoulder   Flexion: 155 (supine) degrees   Abduction: 100 degrees   External rotation 0°: 55 degrees with pain  Internal rotation 0°: WFL    Additional Active Range of Motion Details  R shoulder flexion AROM standing = 100°    Passive Range of Motion     Right Shoulder   Flexion: 130 degrees with pain  Abduction: 130 degrees   External rotation 0°: 60 degrees with pain  Internal rotation 0°: WFL    Strength/Myotome Testing     Right Shoulder     Planes of Motion   Flexion: 2-   Abduction: 2-   External rotation at 0°: 2-   Internal rotation at 0°: 3                Insurance:  AMA/CMS Eval/ Re-eval POC expires FOTO Auth Status Co-Insurance   CMS - [facility] 1/15/24 4/15/24  None req No    2/15/24 4/15/24                1.  1/15 2.   1/17 3.   1/23 4.   1/25 5.   1/30 6.   2/1   7.   2/5 8.   2/8 9.   2/14 10.   2/15 RE 11.   2/20 12.   2/22   13.   2/27 14.   2/28 15.   3/5 16.   3/7 17.   3/12 18.   3/14   19.  20. 21. 22. 23. 24.    25.  26. 27. 28. 29. 30.   31. 32.  33. 34. 35. 36.        Precautions: s/p R rotator cuff repair w/ biceps tenotomy 1/4/24     13 14 15 16 17 18   Manuals 2/27/24 2/28/24 3/5/24 3/7/24 3/12/24 3/14/24   STM/MFR          Joint mobs         Neuromobility         Neuro Re-Ed         Cervical retraction     2x10 seated  2x10 seated    Scap retraction/  protraction 20x 20x  HEP      Scap elevation/  depression 20x 20x  HEP      Shoulder isos   10*10'' ea - flex, ER, IR 10x10s ea., flex, ER, IR 10x10s ea.  10x10s ea.             Ther Ex         PROM shoulder Flex, ER, abd Flex, ER, abd  Performed - FB Flex, ER, abd Flex,  ER, abd    Pendulums         Cane flexion 15x5s seated  15x5s seated Progressed to stand      Cane ER 15x5s seated 15x5s seated 2*10 seated AROM 3x10 seated AROM 2x10 seated AAROM 2x10 seated AAROM   Contralateral arm w/flexion   10x 15x5s standing 15x5s 15x5s    Cane abd  10x5s seated  2*10 stand 2x10 standing 2x10 standing 2x10 standing   Table slides 20x5s  20x5s        Finger ladder 10x  10x 10x      Pulleys 20x5s  20x5s  20*5'' 20x5s flex, abd 20x5s flex, abd 20x5s flex, abd   Supine shld flex AROM   3*10 3x10 2x10 2x10    Stand flexion AROM   10 10x 15x 15x                              Ther Activity         Pt education                           Modalities         CP R shoulder                     Access Code: [email or web address]  URL: [email or web address]  Date: 02/01/2024  Prepared by: Irvin Abdul    Exercises  - Seated Scapular Retraction  - 2-3 x daily - 7 x weekly - 2 sets - 10 reps - 5 hold  - Seated Shoulder Shrugs  - 2-3 x daily - 7 x weekly - 2 sets - 10 reps - 5 hold  - Circular Shoulder Pendulum with Table Support  - 2-3 x daily - 7 x weekly - 1 sets - 30 reps  - Standing Elbow Flexion Extension AROM  - 2-3 x daily - 7 x weekly - 2 sets - 10 reps - 5 hold  - Supine Shoulder Flexion with Dowel  - 1 x daily - 7 x weekly - 1 sets - 10 reps - 5 hold  - Supine Shoulder External Rotation with Dowel  - 1 x daily - 7 x weekly - 1 sets - 10 reps - 5 hold       Physical Therapy Protocol: Rotator Cuff Repair Immediate Therapy    Based on Moon Shoulder Protocol    Modalities: performed at the physical therapist's discretion within the guidelines of this protocol.    Wound Care: Keep the dressing clean and dry.  Light drainage may occur the first 2 days postop.  You may remove the dressings and get the incision wet in the shower 72 hours after surgery.  DO NOT remove steri-strips or sutures.  DO NOT immerse the incision under water.  Carefully pat the incision dry.  If there is wound drainage, re-apply a  fresh dry gauze dressing.    Sling: wear the sling at all times, including sleep, except for hygiene for 6 weeks following surgery. Keep the arm by the side during hygiene. The patient may remove the sling, keeping the arm by the side to perform gentle wrist and elbow range of motion. The sling may be removed for PT sessions as described below.    PHASE I (Weeks 0-4): passive range of motion       · Begin passive range of motion within 7 days following surgery. Completed 3 times per week       · Begin scapula exercises with the arm in the sling within 7 days following surgery, including scapular elevation, depression, retraction, and protraction. Completed daily Weeks 0-6       · Begin pendulum exercises at home. Completed 2 times per day    PHASE II (Weeks 4-8): active assisted range of motion       · Week 4: lying active assisted motion            o Using a stick or cane while lying flat, the nonsurgical arm moves the injured arm through different motions, including forward flexion, external rotation, and abduction. Completed daily       · Weeks 5: 45-degree active assisted motion            o Using a stick or cane while lying propped at 45 degrees, the nonsurgical arm moves the injured arm through different motions, including forward flexion, external rotation, and abduction. Completed daily       · Weeks 6-8: upright active assisted motion            o Using a stick or cane while sitting up or standing, the nonsurgical arm moves the injured arm through different motions, including forward flexion, external rotation, and abduction. Completed daily       · Week 6: scapula exercises without the sling, including scapular elevation, depression, retraction, and protraction. Completed daily    PHASE III (Weeks 8-12): active motion       · AROM: while sitting up or standing, actively forward flex and abduct the shoulders. Completed daily            o Important not to hike the shoulder up towards the ear.       ·  Isometric exercises: push and pull a folded towel or pillow into a wall. Completed daily, holding push/pull for 15s with 30s rest in between for 10-15 reps            o Completed with elbow flexed at 90 degrees and with shoulder internal/external rotation    PHASE IV (Weeks 12-16): resisted exercise       · Begin rotator cuff strengthening using weights and/or elastic bands. Completed 3 times per week for 3-4 sets of 10-15 reps            o Resisted shoulder internal/external rotation            o Resisted deltoid strengthening            o Resisted scapula strengthening            o AVOID doing full can or empty can exercises       · Begin light shoulder stretching            o Hold each stretch for 15s, then rest 15s. Repeat 5 times            o After Week 16, may use aggressive stretching if needed

## 2024-03-18 ENCOUNTER — OFFICE VISIT (OUTPATIENT)
Dept: OCCUPATIONAL THERAPY | Facility: CLINIC | Age: 74
End: 2024-03-18
Payer: MEDICARE

## 2024-03-18 DIAGNOSIS — M79.89 SWELLING OF RIGHT HAND: ICD-10-CM

## 2024-03-18 DIAGNOSIS — R20.0 NUMBNESS AND TINGLING IN RIGHT HAND: Primary | ICD-10-CM

## 2024-03-18 DIAGNOSIS — M25.641 JOINT STIFFNESS OF HAND, RIGHT: ICD-10-CM

## 2024-03-18 DIAGNOSIS — M25.631 STIFFNESS OF RIGHT WRIST JOINT: ICD-10-CM

## 2024-03-18 DIAGNOSIS — R20.2 NUMBNESS AND TINGLING IN RIGHT HAND: Primary | ICD-10-CM

## 2024-03-18 PROCEDURE — 97110 THERAPEUTIC EXERCISES: CPT

## 2024-03-18 PROCEDURE — 97530 THERAPEUTIC ACTIVITIES: CPT

## 2024-03-18 NOTE — PROGRESS NOTES
Daily Note     Today's date: 3/18/2024  Patient name: Dominique Varela  : 1950  MRN: [id number]  Referring provider: Karolyn Valdovinos PA-C  Dx:   Encounter Diagnosis     ICD-10-CM    1. Numbness and tingling in right hand  R20.0     R20.2       2. Swelling of right hand  M79.89       3. Stiffness of right wrist joint  M25.631       4. Joint stiffness of hand, right  M25.641                      Subjective: Patient notes increased stiffness in wrist and fingers especially with the cold weather.     Objective: See treatment diary below    Auth Tracker  Auth Status Total   Visits  Expiration date Co-Insurance   pending                                  Visit Tracker  Date 2/27 2/28 3/5 3/7 3/12 3/14    3/18                                                                           PMHx:   has a past medical history of Breast cyst, GERD (gastroesophageal reflux disease), and Macular degeneration.    Precautions:     Manuals HEP 3/18/2024                        Ther Ex     Education on HEP and dx  x5min   AROM tendon glides x x10   AROM table top extension x x10   AROM digit add/abduction x x10   AROM wrist flex/ext/RD/UD x x10   Sponge   X10 blue   PROM wrist flex/ext x x3 10 sec   Wrist flexor stretch   5x5 sec   PowerWeb Squeeze and rotate  X10 green                  Ther Act      Towel scrunch   x5   juxtaciser  x10   Digi flex  2X10 green   Flex bar  X10 red   Sponges grasp and release   Groups of 5, whole bin 2 cycles   Digital extension   Rubberband x10 red             Modalities     MHP  x5 min           Assessment:   Patient tolerated session well. Session focused on ROM and gentle strengthening to improve deficits and functional performance with daily activities. Patient tolerated all TE/TA with minimal complaints. Patient progressing well towards goals. Patient benefiting from skilled hand therapy OT to reduce deficits to improve independence with daily activities.        Plan:   Focus on ROM and  strengthening to improve ability to complete daily activites with ease.  POC 2/27/24 - 5/7/24

## 2024-03-19 ENCOUNTER — OFFICE VISIT (OUTPATIENT)
Dept: PHYSICAL THERAPY | Facility: CLINIC | Age: 74
End: 2024-03-19
Payer: MEDICARE

## 2024-03-19 DIAGNOSIS — Z98.890 S/P RIGHT ROTATOR CUFF REPAIR: ICD-10-CM

## 2024-03-19 DIAGNOSIS — M75.121 NONTRAUMATIC COMPLETE TEAR OF RIGHT ROTATOR CUFF: Primary | ICD-10-CM

## 2024-03-19 PROCEDURE — 97112 NEUROMUSCULAR REEDUCATION: CPT

## 2024-03-19 PROCEDURE — 97110 THERAPEUTIC EXERCISES: CPT

## 2024-03-19 NOTE — PROGRESS NOTES
Daily Note      Today's date: 3/19/2024  Patient name: Dominique Varela  : 1950  MRN: [id number]  Referring provider: Karolyn Valdovinos PA-C  Dx:   Encounter Diagnosis     ICD-10-CM    1. Nontraumatic complete tear of right rotator cuff  M75.121       2. S/P right rotator cuff repair  Z98.890           Subjective: Pt reports that her right shoulder is doing well. Pt states that she has slow progress in the right hand with gripping and making a fist.        Objective: See treatment diary below    Assessment: Pt tolerated treatment well.  Pt performed shoulder abduction in standing and was able to lift to ~90° with muscle trembling. Pt noted fatigue with forward flexion actively as well. Pt would benefit from continued efforts to strengthen in order to return to PLOF.     Plan: Continue per plan of care.  Progress treatment as tolerated.            Insurance:  A/CMS Eval/ Re-eval POC expires FOTO Auth Status Co-Insurance   CMS - [facility] 1/15/24 4/15/24  None req No    2/15/24 4/15/24       3/14/24 4/30/24        1.  1/15 2.   1/ 3.    4.    5.    6.   2/   7.   2/ 8.   2/8 9.   2 10.   2/15 RE 11.   2 12.   2   13.   2 14.   2 15.   3/5 16.   3/7 17.   3/12 18.   3   19.   3/19 20. 21. 22. 23. 24.    25.  26. 27. 28. 29. 30.   31. 32.  33. 34. 35. 36.        Precautions: s/p R rotator cuff repair w/ biceps tenotomy 24     15 16 17 18 19    Manuals 3/5/24 3/7/24 3/12/24 3/14/24 3/19/24    STM/MFR          Joint mobs         Neuromobility         Neuro Re-Ed         Cervical retraction   2x10 seated  2x10 seated  2x10 seated    Scap retraction/  protraction HEP        Scap elevation/  depression HEP        Shoulder isos 10*10'' ea - flex, ER, IR 10x10s ea., flex, ER, IR 10x10s ea.  10x10s ea.  10x10s ea.              Ther Ex         PROM shoulder Performed - FB Flex, ER, abd Flex, ER, abd      Pendulums         Cane flexion Progressed to stand        Cane ER 2*10 seated AROM 3x10  seated AROM 2x10 seated AAROM 2x10 seated AAROM 2x10 seated AAROM    Contralateral arm w/flexion 10x 15x5s standing 15x5s 15x5s  20x5s     Cane abd 2*10 stand 2x10 standing 2x10 standing 2x10 standing 10x   10x AROM    Table slides         Finger ladder 10x        Pulleys 20*5'' 20x5s flex, abd 20x5s flex, abd 20x5s flex, abd 20x5s flex, abd    Supine shld flex AROM 3*10 3x10 2x10 2x10  2x10    Stand flexion AROM 10 10x 15x 15x 2x10                                Ther Activity         Pt education                           Modalities         CP R shoulder                     Access Code: [email or web address]  URL: [email or web address]  Date: 02/01/2024  Prepared by: Irvin Abdul    Exercises  - Seated Scapular Retraction  - 2-3 x daily - 7 x weekly - 2 sets - 10 reps - 5 hold  - Seated Shoulder Shrugs  - 2-3 x daily - 7 x weekly - 2 sets - 10 reps - 5 hold  - Circular Shoulder Pendulum with Table Support  - 2-3 x daily - 7 x weekly - 1 sets - 30 reps  - Standing Elbow Flexion Extension AROM  - 2-3 x daily - 7 x weekly - 2 sets - 10 reps - 5 hold  - Supine Shoulder Flexion with Dowel  - 1 x daily - 7 x weekly - 1 sets - 10 reps - 5 hold  - Supine Shoulder External Rotation with Dowel  - 1 x daily - 7 x weekly - 1 sets - 10 reps - 5 hold       Physical Therapy Protocol: Rotator Cuff Repair Immediate Therapy    Based on Moon Shoulder Protocol    Modalities: performed at the physical therapist's discretion within the guidelines of this protocol.    Wound Care: Keep the dressing clean and dry.  Light drainage may occur the first 2 days postop.  You may remove the dressings and get the incision wet in the shower 72 hours after surgery.  DO NOT remove steri-strips or sutures.  DO NOT immerse the incision under water.  Carefully pat the incision dry.  If there is wound drainage, re-apply a fresh dry gauze dressing.    Sling: wear the sling at all times, including sleep, except for hygiene for 6 weeks following surgery.  Keep the arm by the side during hygiene. The patient may remove the sling, keeping the arm by the side to perform gentle wrist and elbow range of motion. The sling may be removed for PT sessions as described below.    PHASE I (Weeks 0-4): passive range of motion       · Begin passive range of motion within 7 days following surgery. Completed 3 times per week       · Begin scapula exercises with the arm in the sling within 7 days following surgery, including scapular elevation, depression, retraction, and protraction. Completed daily Weeks 0-6       · Begin pendulum exercises at home. Completed 2 times per day    PHASE II (Weeks 4-8): active assisted range of motion       · Week 4: lying active assisted motion            o Using a stick or cane while lying flat, the nonsurgical arm moves the injured arm through different motions, including forward flexion, external rotation, and abduction. Completed daily       · Weeks 5: 45-degree active assisted motion            o Using a stick or cane while lying propped at 45 degrees, the nonsurgical arm moves the injured arm through different motions, including forward flexion, external rotation, and abduction. Completed daily       · Weeks 6-8: upright active assisted motion            o Using a stick or cane while sitting up or standing, the nonsurgical arm moves the injured arm through different motions, including forward flexion, external rotation, and abduction. Completed daily       · Week 6: scapula exercises without the sling, including scapular elevation, depression, retraction, and protraction. Completed daily    PHASE III (Weeks 8-12): active motion       · AROM: while sitting up or standing, actively forward flex and abduct the shoulders. Completed daily            o Important not to hike the shoulder up towards the ear.       · Isometric exercises: push and pull a folded towel or pillow into a wall. Completed daily, holding push/pull for 15s with 30s rest in  between for 10-15 reps            o Completed with elbow flexed at 90 degrees and with shoulder internal/external rotation    PHASE IV (Weeks 12-16): resisted exercise       · Begin rotator cuff strengthening using weights and/or elastic bands. Completed 3 times per week for 3-4 sets of 10-15 reps            o Resisted shoulder internal/external rotation            o Resisted deltoid strengthening            o Resisted scapula strengthening            o AVOID doing full can or empty can exercises       · Begin light shoulder stretching            o Hold each stretch for 15s, then rest 15s. Repeat 5 times            o After Week 16, may use aggressive stretching if needed

## 2024-03-21 ENCOUNTER — OFFICE VISIT (OUTPATIENT)
Dept: PHYSICAL THERAPY | Facility: CLINIC | Age: 74
End: 2024-03-21
Payer: MEDICARE

## 2024-03-21 ENCOUNTER — OFFICE VISIT (OUTPATIENT)
Dept: OCCUPATIONAL THERAPY | Facility: CLINIC | Age: 74
End: 2024-03-21
Payer: MEDICARE

## 2024-03-21 DIAGNOSIS — R20.2 NUMBNESS AND TINGLING IN RIGHT HAND: Primary | ICD-10-CM

## 2024-03-21 DIAGNOSIS — Z98.890 S/P RIGHT ROTATOR CUFF REPAIR: ICD-10-CM

## 2024-03-21 DIAGNOSIS — R20.0 NUMBNESS AND TINGLING IN RIGHT HAND: Primary | ICD-10-CM

## 2024-03-21 DIAGNOSIS — M75.121 NONTRAUMATIC COMPLETE TEAR OF RIGHT ROTATOR CUFF: Primary | ICD-10-CM

## 2024-03-21 DIAGNOSIS — M25.631 STIFFNESS OF RIGHT WRIST JOINT: ICD-10-CM

## 2024-03-21 DIAGNOSIS — M79.89 SWELLING OF RIGHT HAND: ICD-10-CM

## 2024-03-21 DIAGNOSIS — M25.641 JOINT STIFFNESS OF HAND, RIGHT: ICD-10-CM

## 2024-03-21 PROCEDURE — 97112 NEUROMUSCULAR REEDUCATION: CPT

## 2024-03-21 PROCEDURE — 97110 THERAPEUTIC EXERCISES: CPT

## 2024-03-21 PROCEDURE — 97530 THERAPEUTIC ACTIVITIES: CPT

## 2024-03-21 NOTE — PROGRESS NOTES
Daily Note     Today's date: 3/21/2024  Patient name: Dominique Varela  : 1950  MRN: [id number]  Referring provider: Karolyn Valdovinos PA-C  Dx:   Encounter Diagnosis     ICD-10-CM    1. Numbness and tingling in right hand  R20.0     R20.2       2. Swelling of right hand  M79.89       3. Stiffness of right wrist joint  M25.631       4. Joint stiffness of hand, right  M25.641                      Subjective: Patient notes increased stiffness in wrist and fingers especially with the cold weather.     Objective: See treatment diary below    Auth Tracker  Auth Status Total   Visits  Expiration date Co-Insurance   pending                                  Visit Tracker  Date 2/27 2/28 3/5 3/7 3/12 3/14    3/18 3/21                                                                          PMHx:   has a past medical history of Breast cyst, GERD (gastroesophageal reflux disease), and Macular degeneration.    Precautions:     Manuals HEP 3/21/2024                        Ther Ex     Education on HEP and dx  x5min   AROM tendon glides x x10   AROM table top extension x x10   AROM digit add/abduction x x10   AROM wrist flex/ext/RD/UD x x10   Sponge   X10 blue   PROM wrist flex/ext x x3 10 sec   Wrist flexor stretch   5x5 sec   PowerWeb Squeeze and rotate  X10 green                  Ther Act      Towel scrunch   x5   juxtaciser  x10   Digi flex  2X10 green   Flex bar  X10 red   Sponges grasp and release   Groups of 5, whole bin 2 cycles   Digital extension   Rubberband x10 red             Modalities     MHP  x5 min           Assessment:   Patient tolerated session well. Session focused on ROM and gentle strengthening to improve deficits and functional performance with daily activities. Patient tolerated all TE/TA with minimal complaints. Patient progressing well towards goals. Patient benefiting from skilled hand therapy OT to reduce deficits to improve independence with daily activities.        Plan:   Focus on ROM and  strengthening to improve ability to complete daily activites with ease.  POC 2/27/24 - 5/7/24

## 2024-03-21 NOTE — PROGRESS NOTES
Daily Note      Today's date: 3/21/2024  Patient name: Dominique Varela  : 1950  MRN: [id number]  Referring provider: Karolyn Valdovinos PA-C  Dx:   Encounter Diagnosis     ICD-10-CM    1. Nontraumatic complete tear of right rotator cuff  M75.121       2. S/P right rotator cuff repair  Z98.890           Subjective: Pt reports that the numbness in her hand has gone away, but when using her hand, she still gets pain shooting up the arm.        Objective: See treatment diary below    Assessment: Pt tolerated treatment well.  Pt demonstrates improved A/PROM into flexion and abduction, but fatigues quickly with AROM exercises. Pt notes posterior cuff pain with active and passive abduction ROM. Reviewed the goals of the current phase of protocol as pt is approaching strengthening phase in the coming weeks.     Plan: Continue per plan of care.  Continue AROM phase.         Insurance:  AMA/CMS Eval/ Re-eval POC expires FOTO Auth Status Co-Insurance   CMS - [facility] 1/15/24 4/15/24  None req No    2/15/24 4/15/24       3/14/24 4/30/24        1.  1/15 2.   1/17 3.   1 4.    5.   1 6.   2/1   7.   2/5 8.   2/8 9.   2/14 10.   2/15 RE 11.   2 12.   2   13.   2 14.   2/28 15.   3/5 16.   3/7 17.   3/12 18.   3/14 RE   19.   3/19 20.  3 21. 22. 23. 24.    25.  26. 27. 28. 29. 30.   31. 32.  33. 34. 35. 36.        Precautions: s/p R rotator cuff repair w/ biceps tenotomy 24     15 16 17 18 (RE-EVAL) 19 20   Manuals 3/5/24 3/7/24 3/12/24 3/14/24 3/19/24 3/21/24   STM/MFR          Joint mobs         Neuromobility         Neuro Re-Ed         Cervical retraction   2x10 seated  2x10 seated  2x10 seated 2x10 seated    Scap retraction/  protraction HEP        Scap elevation/  depression HEP        Shoulder isos 10*10'' ea - flex, ER, IR 10x10s ea., flex, ER, IR 10x10s ea.  10x10s ea.  10x10s ea.  10x10s ea.             Ther Ex         PROM shoulder Performed - FB Flex, ER, abd Flex, ER, abd   Flex, ER, abd    Pendulums         Cane flexion Progressed to stand        Cane ER 2*10 seated AROM 3x10 seated AROM 2x10 seated AAROM 2x10 seated AAROM 2x10 seated AAROM 2x10 standing AAROM   Contralateral arm w/flexion 10x 15x5s standing 15x5s 15x5s  20x5s  20x5s    Cane abd 2*10 stand 2x10 standing 2x10 standing 2x10 standing 10x   10x AROM 10x   10x AROM   Table slides         Finger ladder 10x        Pulleys 20*5'' 20x5s flex, abd 20x5s flex, abd 20x5s flex, abd 20x5s flex, abd 20x5s flex, abd   Supine shld flex AROM 3*10 3x10 2x10 2x10  2x10 2x10    Stand flexion AROM 10 10x 15x 15x 2x10  2x10                               Ther Activity         Pt education                           Modalities         CP R shoulder                     Access Code: [email or web address]  URL: [email or web address]  Date: 02/01/2024  Prepared by: Irvin Abdul    Exercises  - Seated Scapular Retraction  - 2-3 x daily - 7 x weekly - 2 sets - 10 reps - 5 hold  - Seated Shoulder Shrugs  - 2-3 x daily - 7 x weekly - 2 sets - 10 reps - 5 hold  - Circular Shoulder Pendulum with Table Support  - 2-3 x daily - 7 x weekly - 1 sets - 30 reps  - Standing Elbow Flexion Extension AROM  - 2-3 x daily - 7 x weekly - 2 sets - 10 reps - 5 hold  - Supine Shoulder Flexion with Dowel  - 1 x daily - 7 x weekly - 1 sets - 10 reps - 5 hold  - Supine Shoulder External Rotation with Dowel  - 1 x daily - 7 x weekly - 1 sets - 10 reps - 5 hold       Physical Therapy Protocol: Rotator Cuff Repair Immediate Therapy    Based on Moon Shoulder Protocol    Modalities: performed at the physical therapist's discretion within the guidelines of this protocol.    Wound Care: Keep the dressing clean and dry.  Light drainage may occur the first 2 days postop.  You may remove the dressings and get the incision wet in the shower 72 hours after surgery.  DO NOT remove steri-strips or sutures.  DO NOT immerse the incision under water.  Carefully pat the incision dry.  If there is wound  drainage, re-apply a fresh dry gauze dressing.    Sling: wear the sling at all times, including sleep, except for hygiene for 6 weeks following surgery. Keep the arm by the side during hygiene. The patient may remove the sling, keeping the arm by the side to perform gentle wrist and elbow range of motion. The sling may be removed for PT sessions as described below.    PHASE I (Weeks 0-4): passive range of motion       · Begin passive range of motion within 7 days following surgery. Completed 3 times per week       · Begin scapula exercises with the arm in the sling within 7 days following surgery, including scapular elevation, depression, retraction, and protraction. Completed daily Weeks 0-6       · Begin pendulum exercises at home. Completed 2 times per day    PHASE II (Weeks 4-8): active assisted range of motion       · Week 4: lying active assisted motion            o Using a stick or cane while lying flat, the nonsurgical arm moves the injured arm through different motions, including forward flexion, external rotation, and abduction. Completed daily       · Weeks 5: 45-degree active assisted motion            o Using a stick or cane while lying propped at 45 degrees, the nonsurgical arm moves the injured arm through different motions, including forward flexion, external rotation, and abduction. Completed daily       · Weeks 6-8: upright active assisted motion            o Using a stick or cane while sitting up or standing, the nonsurgical arm moves the injured arm through different motions, including forward flexion, external rotation, and abduction. Completed daily       · Week 6: scapula exercises without the sling, including scapular elevation, depression, retraction, and protraction. Completed daily    PHASE III (Weeks 8-12): active motion       · AROM: while sitting up or standing, actively forward flex and abduct the shoulders. Completed daily            o Important not to hike the shoulder up towards the  ear.       · Isometric exercises: push and pull a folded towel or pillow into a wall. Completed daily, holding push/pull for 15s with 30s rest in between for 10-15 reps            o Completed with elbow flexed at 90 degrees and with shoulder internal/external rotation    PHASE IV (Weeks 12-16): resisted exercise       · Begin rotator cuff strengthening using weights and/or elastic bands. Completed 3 times per week for 3-4 sets of 10-15 reps            o Resisted shoulder internal/external rotation            o Resisted deltoid strengthening            o Resisted scapula strengthening            o AVOID doing full can or empty can exercises       · Begin light shoulder stretching            o Hold each stretch for 15s, then rest 15s. Repeat 5 times            o After Week 16, may use aggressive stretching if needed

## 2024-03-25 ENCOUNTER — OFFICE VISIT (OUTPATIENT)
Dept: OCCUPATIONAL THERAPY | Facility: CLINIC | Age: 74
End: 2024-03-25
Payer: MEDICARE

## 2024-03-25 ENCOUNTER — OFFICE VISIT (OUTPATIENT)
Dept: OBGYN CLINIC | Facility: CLINIC | Age: 74
End: 2024-03-25

## 2024-03-25 VITALS
SYSTOLIC BLOOD PRESSURE: 147 MMHG | HEIGHT: 63 IN | WEIGHT: 226 LBS | DIASTOLIC BLOOD PRESSURE: 91 MMHG | HEART RATE: 71 BPM | BODY MASS INDEX: 40.04 KG/M2

## 2024-03-25 DIAGNOSIS — M79.89 SWELLING OF RIGHT HAND: ICD-10-CM

## 2024-03-25 DIAGNOSIS — Z98.890 S/P RIGHT ROTATOR CUFF REPAIR: Primary | ICD-10-CM

## 2024-03-25 DIAGNOSIS — R20.0 NUMBNESS AND TINGLING IN RIGHT HAND: Primary | ICD-10-CM

## 2024-03-25 DIAGNOSIS — M25.641 JOINT STIFFNESS OF HAND, RIGHT: ICD-10-CM

## 2024-03-25 DIAGNOSIS — R20.2 NUMBNESS AND TINGLING IN RIGHT HAND: Primary | ICD-10-CM

## 2024-03-25 DIAGNOSIS — M25.631 STIFFNESS OF RIGHT WRIST JOINT: ICD-10-CM

## 2024-03-25 DIAGNOSIS — G90.511 COMPLEX REGIONAL PAIN SYNDROME TYPE 1 OF RIGHT UPPER EXTREMITY: ICD-10-CM

## 2024-03-25 PROCEDURE — 97530 THERAPEUTIC ACTIVITIES: CPT

## 2024-03-25 PROCEDURE — 97110 THERAPEUTIC EXERCISES: CPT

## 2024-03-25 PROCEDURE — 99024 POSTOP FOLLOW-UP VISIT: CPT | Performed by: ORTHOPAEDIC SURGERY

## 2024-03-25 NOTE — PROGRESS NOTES
Daily Note     Today's date: 3/25/2024  Patient name: Dominique Varela  : 1950  MRN: [id number]  Referring provider: Karolyn Valdovinos PA-C  Dx:   Encounter Diagnosis     ICD-10-CM    1. Numbness and tingling in right hand  R20.0     R20.2       2. Swelling of right hand  M79.89       3. Stiffness of right wrist joint  M25.631       4. Joint stiffness of hand, right  M25.641                    Subjective: Patient notes she had follow up with MD in which she will be following up with pain management, states she can open a bottle cap that's already been opened but not an unopened water bottle.        Objective: See treatment diary below    Auth Tracker  Auth Status Total   Visits  Expiration date Co-Insurance   pending                                  Visit Tracker  Date 2/27 2/28 3/5 3/7 3/12 3/14    3/18 3/21 3/25 RE                                                                        PMHx:   has a past medical history of Breast cyst, GERD (gastroesophageal reflux disease), and Macular degeneration.    Precautions:     Manuals HEP 3/25/2024                        Ther Ex     Education on HEP and dx  x5min   AROM tendon glides x x10   AROM table top extension x x10   AROM digit add/abduction x x10   Wrist PRE's x X10 2# 3 ways   Sponge      PROM wrist flex/ext x x3 10 sec   Wrist flexor stretch   5x5 sec   PowerWeb Squeeze and rotate  X10 green   Supination/pronation hammer  X10 2# wts.             Ther Act      Towel scrunch   x5   juxtaciser  x10   Digi flex  2X10 green   Flex bar N's/U's  X10 red   Sponges grasp and release   Groups of 5, whole bin 2 cycles   Digital extension   Rubberband x10 red             Modalities     MHP  x5 min           Assessment:   Patient tolerated session well. Session focused on ROM and gentle strengthening to improve deficits and functional performance with daily activities. Patient tolerated all TE/TA with minimal complaints. Patient progressing well towards goals. Patient  benefiting from skilled hand therapy OT to reduce deficits to improve independence with daily activities.        Plan:   Focus on ROM and strengthening to improve ability to complete daily activites with ease.  POC 2/27/24 - 5/7/24

## 2024-03-25 NOTE — PROGRESS NOTES
SUBJECTIVE:  Patient is a 73 y.o. year old female who presents for follow up now 11.5 weeks s/p Right shoulder arthroscopic rotator cuff repair, biceps tenotomy, Extensive Debridement - Right performed on  1/4/2024.  Today, the patient notes her shoulder is progressing well. She continues to attend PT consistently per protocol and notes her motion improves greatly by the end of sessions. She is starting to work on strengthening exercises. Of note, the patient reports resolved numbness/tingling in the hand, however, she does experience wrist stiffness and pain with using the hand for ADLs. Specifically, the patient states she cannot open cans or twist a toothpaste cap with the operative hand. The patient has been attending OT for this for about 4 weeks, but notes minimal improvement in . The patient does report a shiny appearance to the fingers and mild swelling compared to the other hand. She denies temperature changes. Dominique states this is frustrating and feels her shoulder progress would be further along if not for these wrist symptoms.    Surgical Shoulder SANE Score: 75      VITALS:  Vitals:    03/25/24 1006   BP: 147/91   Pulse: 71       PHYSICAL EXAMINATION:  General: well developed and well nourished, alert, oriented times 3, and appears comfortable  Psychiatric: Normal    MUSCULOSKELETAL EXAMINATION:    Incision: well-healed  Range of Motion: FF 95 actively and 130 passively without pain, ER 30 actively and 45 passively without pain, IR T12 and symmetric without pain  - Tinel's at carpal tunnel  - Tinel's at cubital tunnel  Able to make composite fist  Weakness with   Wrist extension 45 degrees compared to 70 contralaterally  Neurovascular status: intact      PLAN:    Regarding her rotator cuff, I believe the patient is continuing to progress appropriately. She demonstrates improving active and passive ROM today and is able to resist external rotation. Continue physical therapy per provided protocol  with focus on beginning resisted exercises. Use OTC medications as needed for pain control.     Regarding her wrist stiffness and pain, we discussed that this can occur following rotator cuff repair for various reasons, including undergoing a nerve block or extended time in the sling. Dominique is making some progressions since surgery. She no longer has numbness/tingling into the hand and we discussed that I expect her stiffness to continue to improve, especially as she remains diligent with OT and home exercises. I recommended a referral to Spine and Pain management to discuss a possible additional diagnosis of CRPS.  Ultimately I feel it is unlikely that she truly has CRPS at this time.  However I think an evaluation with the pain specialist is still worthwhile.    We will plan to follow up with Dominique in 6 weeks. In the meantime, she will set up an appointment with Spine and Pain.

## 2024-03-26 ENCOUNTER — OFFICE VISIT (OUTPATIENT)
Dept: PHYSICAL THERAPY | Facility: CLINIC | Age: 74
End: 2024-03-26
Payer: MEDICARE

## 2024-03-26 DIAGNOSIS — M75.121 NONTRAUMATIC COMPLETE TEAR OF RIGHT ROTATOR CUFF: Primary | ICD-10-CM

## 2024-03-26 DIAGNOSIS — Z98.890 S/P RIGHT ROTATOR CUFF REPAIR: ICD-10-CM

## 2024-03-26 PROCEDURE — 97110 THERAPEUTIC EXERCISES: CPT

## 2024-03-26 PROCEDURE — 97112 NEUROMUSCULAR REEDUCATION: CPT

## 2024-03-26 NOTE — PROGRESS NOTES
Daily Note      Today's date: 3/26/2024  Patient name: Dominique Varela  : 1950  MRN: [id number]  Referring provider: Karolyn Valdovinos PA-C  Dx:   Encounter Diagnosis     ICD-10-CM    1. Nontraumatic complete tear of right rotator cuff  M75.121       2. S/P right rotator cuff repair  Z98.890           Subjective: Pt reports that she followed up with orthopedics and continues to notice improvement in the right shoulder, but her right hand is still stiff. Pt states that she received a glove for swelling management, which she feels is helping, but still has stiffness. Pt states she was referred to pain management for her hand.    Objective: See treatment diary below    Assessment: Pt tolerated treatment well.  Pt demonstrates near normal flexion PROM with end range stiffness and improving, but limited shoulder abduction PROM. Pt continues to be limited with AROM for both directions due to fatigue and weakness.     Plan: Continue per plan of care.           Insurance:  AMA/CMS Eval/ Re-eval POC expires FOTO Auth Status Co-Insurance   CMS - [facility] 1/15/24 4/15/24  None req No    2/15/24 4/15/24       3/14/24 4/30/24        1.  1/15 2.   1 3.    4.    5.    6.   2/   7.   2/5 8.   2/8 9.   2/14 10.   2/15 RE 11.   2 12.   2   13.   2 14.   2 15.   3/5 16.   3/7 17.   3/12 18.   3/14 RE   19.   3 20.  3/ 21.  3/26 22. 23. 24.    25.  26. 27. 28. 29. 30.   31. 32.  33. 34. 35. 36.        Precautions: s/p R rotator cuff repair w/ biceps tenotomy 24     17 18 (RE-EVAL) 19 20 21    Manuals 3/12/24 3/14/24 3/19/24 3/21/24 3/26/24    STM/MFR          Joint mobs         Neuromobility         Neuro Re-Ed         Cervical retraction 2x10 seated  2x10 seated  2x10 seated 2x10 seated  2x10 seated    Scap retraction/  protraction         Scap elevation/  depression         Shoulder isos 10x10s ea.  10x10s ea.  10x10s ea.  10x10s ea.  10x10s              Ther Ex         PROM shoulder Flex, ER, abd    Flex, ER, abd Flex, ER, abd    Pendulums         Cane flexion         Cane ER 2x10 seated AAROM 2x10 seated AAROM 2x10 seated AAROM 2x10 standing AAROM     Contralateral arm w/flexion 15x5s 15x5s  20x5s  20x5s  20x5s standing    Cane abd 2x10 standing 2x10 standing 10x   10x AROM 10x   10x AROM 10x5s cane  2x10 AROM    Table slides         Finger ladder         Pulleys 20x5s flex, abd 20x5s flex, abd 20x5s flex, abd 20x5s flex, abd 20x5s flex, abd    Supine shld flex AROM 2x10 2x10  2x10 2x10  2x10     Stand flexion AROM 15x 15x 2x10  2x10  2x10     Sidelying ER     2x10 AROM                      Ther Activity         Pt education                           Modalities         CP R shoulder                     Access Code: [email or web address]  URL: [email or web address]  Date: 02/01/2024  Prepared by: Irvin Abdul    Exercises  - Seated Scapular Retraction  - 2-3 x daily - 7 x weekly - 2 sets - 10 reps - 5 hold  - Seated Shoulder Shrugs  - 2-3 x daily - 7 x weekly - 2 sets - 10 reps - 5 hold  - Circular Shoulder Pendulum with Table Support  - 2-3 x daily - 7 x weekly - 1 sets - 30 reps  - Standing Elbow Flexion Extension AROM  - 2-3 x daily - 7 x weekly - 2 sets - 10 reps - 5 hold  - Supine Shoulder Flexion with Dowel  - 1 x daily - 7 x weekly - 1 sets - 10 reps - 5 hold  - Supine Shoulder External Rotation with Dowel  - 1 x daily - 7 x weekly - 1 sets - 10 reps - 5 hold       Physical Therapy Protocol: Rotator Cuff Repair Immediate Therapy    Based on Moon Shoulder Protocol    Modalities: performed at the physical therapist's discretion within the guidelines of this protocol.    Wound Care: Keep the dressing clean and dry.  Light drainage may occur the first 2 days postop.  You may remove the dressings and get the incision wet in the shower 72 hours after surgery.  DO NOT remove steri-strips or sutures.  DO NOT immerse the incision under water.  Carefully pat the incision dry.  If there is wound drainage, re-apply  a fresh dry gauze dressing.    Sling: wear the sling at all times, including sleep, except for hygiene for 6 weeks following surgery. Keep the arm by the side during hygiene. The patient may remove the sling, keeping the arm by the side to perform gentle wrist and elbow range of motion. The sling may be removed for PT sessions as described below.    PHASE I (Weeks 0-4): passive range of motion       · Begin passive range of motion within 7 days following surgery. Completed 3 times per week       · Begin scapula exercises with the arm in the sling within 7 days following surgery, including scapular elevation, depression, retraction, and protraction. Completed daily Weeks 0-6       · Begin pendulum exercises at home. Completed 2 times per day    PHASE II (Weeks 4-8): active assisted range of motion       · Week 4: lying active assisted motion            o Using a stick or cane while lying flat, the nonsurgical arm moves the injured arm through different motions, including forward flexion, external rotation, and abduction. Completed daily       · Weeks 5: 45-degree active assisted motion            o Using a stick or cane while lying propped at 45 degrees, the nonsurgical arm moves the injured arm through different motions, including forward flexion, external rotation, and abduction. Completed daily       · Weeks 6-8: upright active assisted motion            o Using a stick or cane while sitting up or standing, the nonsurgical arm moves the injured arm through different motions, including forward flexion, external rotation, and abduction. Completed daily       · Week 6: scapula exercises without the sling, including scapular elevation, depression, retraction, and protraction. Completed daily    PHASE III (Weeks 8-12): active motion       · AROM: while sitting up or standing, actively forward flex and abduct the shoulders. Completed daily            o Important not to hike the shoulder up towards the ear.       ·  Isometric exercises: push and pull a folded towel or pillow into a wall. Completed daily, holding push/pull for 15s with 30s rest in between for 10-15 reps            o Completed with elbow flexed at 90 degrees and with shoulder internal/external rotation    PHASE IV (Weeks 12-16): resisted exercise       · Begin rotator cuff strengthening using weights and/or elastic bands. Completed 3 times per week for 3-4 sets of 10-15 reps            o Resisted shoulder internal/external rotation            o Resisted deltoid strengthening            o Resisted scapula strengthening            o AVOID doing full can or empty can exercises       · Begin light shoulder stretching            o Hold each stretch for 15s, then rest 15s. Repeat 5 times            o After Week 16, may use aggressive stretching if needed

## 2024-03-28 ENCOUNTER — OFFICE VISIT (OUTPATIENT)
Dept: OCCUPATIONAL THERAPY | Facility: CLINIC | Age: 74
End: 2024-03-28
Payer: MEDICARE

## 2024-03-28 ENCOUNTER — OFFICE VISIT (OUTPATIENT)
Dept: PHYSICAL THERAPY | Facility: CLINIC | Age: 74
End: 2024-03-28
Payer: MEDICARE

## 2024-03-28 DIAGNOSIS — M75.121 NONTRAUMATIC COMPLETE TEAR OF RIGHT ROTATOR CUFF: Primary | ICD-10-CM

## 2024-03-28 DIAGNOSIS — M25.641 JOINT STIFFNESS OF HAND, RIGHT: ICD-10-CM

## 2024-03-28 DIAGNOSIS — M79.89 SWELLING OF RIGHT HAND: Primary | ICD-10-CM

## 2024-03-28 DIAGNOSIS — R20.0 NUMBNESS AND TINGLING IN RIGHT HAND: ICD-10-CM

## 2024-03-28 DIAGNOSIS — R20.2 NUMBNESS AND TINGLING IN RIGHT HAND: ICD-10-CM

## 2024-03-28 DIAGNOSIS — M25.631 STIFFNESS OF RIGHT WRIST JOINT: ICD-10-CM

## 2024-03-28 DIAGNOSIS — Z98.890 S/P RIGHT ROTATOR CUFF REPAIR: ICD-10-CM

## 2024-03-28 PROCEDURE — 97530 THERAPEUTIC ACTIVITIES: CPT

## 2024-03-28 PROCEDURE — 97112 NEUROMUSCULAR REEDUCATION: CPT | Performed by: PHYSICAL THERAPIST

## 2024-03-28 PROCEDURE — 97110 THERAPEUTIC EXERCISES: CPT | Performed by: PHYSICAL THERAPIST

## 2024-03-28 PROCEDURE — 97110 THERAPEUTIC EXERCISES: CPT

## 2024-03-28 NOTE — PROGRESS NOTES
Re-Evaluation Note     Today's date: 3/28/2024  Patient name: Dominique Varela  : 1950  MRN: [id number]  Referring provider: Karolyn Valdovinos PA-C  Dx:   Encounter Diagnosis     ICD-10-CM    1. Numbness and tingling in right hand  R20.0     R20.2       2. Swelling of right hand  M79.89       3. Stiffness of right wrist joint  M25.631       4. Joint stiffness of hand, right  M25.641                      Subjective: Patient notes the compression edema glove has been helping, fingers are feeling better but overall still stiff, unable to make a tight composite fist.         Objective: See treatment diary below    Active Range of Motion     Left Wrist   Wrist flexion: 56 degrees   Wrist extension: 67 degrees     Right Wrist   Wrist flexion: 48 degrees   Wrist extension: 54 degrees     Left Thumb   Kapandji score: 10 degrees      Right Thumb   Kapandji score: 9 degrees    Additional Active Range of Motion Details  Unable to make tight fist in RUE.     Strength/Myotome Testing     Left Wrist/Hand   Wrist extension: 5  Wrist flexion: 5  Radial deviation: 5  Ulnar deviation: 5     (2nd hand position)     Trial 1: 60    Right Wrist/Hand   Wrist extension: 4-  Wrist flexion: 4-  Radial deviation: 4-  Ulnar deviation: 4-     (2nd hand position)     Trial 1: 20 (+10 lbs)      Goals  Short term goals 2-4 weeks  Establish HEP to enhance performance with ADLs.   MET    Improve active range of motion of RUE by 20  to assist with UE dressing. 50% MET    Achieve composite digital flexion to touch distal montes crease for grasp on utensils. Progressing    In crease  strength by 10 lbs. to enhance gripping hand tools. MET        Long Term goals by discharge  Establish final home exercise program to enhance maximal functional level with ADLs.    Achieve functional active range of motion of RUE for full return to household chores.       Achieve functional strength of RUE for full return to high level ADLs.      Auth  Tracker- NO AUTH REQ BOMN  Auth Status Total   Visits  Expiration date Co-Insurance   pending                                  Visit Tracker  Date 2/27 2/28 3/5 3/7 3/12 3/14    3/18 3/21 3/25 3/28  RE                                                                        PMHx:   has a past medical history of Breast cyst, GERD (gastroesophageal reflux disease), and Macular degeneration.    Precautions: [place]    Manuals HEP 3/28/2024                        Ther Ex     Education on HEP and dx  x5min   AROM tendon glides x x10   AROM table top extension x x10   AROM digit add/abduction x x10   Wrist PRE's x X10 2# 3 ways   Sponge      PROM wrist flex/ext x x3 10 sec   Wrist flexor stretch   5x5 sec   PowerWeb Squeeze and rotate  X10 green   Supination/pronation hammer  X10 2# wts.   Measurements  x        Ther Act      Towel scrunch   x5   juxtaciser  x10   Digi flex  2X10 green   Flex bar N's/U's  X10 red   Sponges grasp and release   Groups of 5, whole bin 2 cycles, lvl 1 gripper   Digital extension   Rubberband x10 red   Putty  squeezes  X5 yellow        Modalities     MHP  x5 min           Assessment:   Patient tolerated session well with upgrades in therapeutic exercises and activities. Progress note measurements taken today in which patient presents with increased AROM of the wrist and increased  strength. Session focused on ROM and gentle strengthening to improve deficits and functional performance with daily activities. Patient tolerated all TE/TA with minimal complaints. Patient progressing well towards goals. Patient benefiting from skilled hand therapy OT to reduce deficits to improve independence with daily activities.        Plan:   Focus on ROM and strengthening to improve ability to complete daily activites with ease.  POC 2/27/24 - 5/7/24

## 2024-03-28 NOTE — PROGRESS NOTES
Daily Note      Today's date: 3/28/2024  Patient name: Dominique Varela  : 1950  MRN: [id number]  Referring provider: Karolyn Valdovinos PA-C  Dx:   Encounter Diagnosis     ICD-10-CM    1. Nontraumatic complete tear of right rotator cuff  M75.121       2. S/P right rotator cuff repair  Z98.890           Subjective: Pt reports she is overall doing well but does acknowledge she feels a bit sore today which she attributes ot a night of interrupted sleep because of the shoulder.        Objective: See treatment diary below    Assessment: Pt tolerated treatment well. Pt demo good progress through protocol and good prognosis with respect to her post op status. Good consistency with AROM including eccentric component.     Plan: Continue per plan of care.  Progress treatment per protocol.         Insurance:  AMA/CMS Eval/ Re-eval POC expires FOTO Auth Status Co-Insurance   CMS - [facility] 1/15/24 4/15/24  None req No    2/15/24 4/15/24       3/14/24 4/30/24        1.  1/15 2.   1 3.    4.    5.   1 6.   2/   7.   2/5 8.   2/8 9.   2/14 10.   2/15 RE 11.   2 12.   2   13.   2 14.   2 15.   3/5 16.   3/7 17.   3/12 18.   3/14 RE   19.   3/19 20.  3/ 21.  3 22.  3 23. 24.    25.  26. 27. 28. 29. 30.   31. 32.  33. 34. 35. 36.        Precautions: s/p R rotator cuff repair w/ biceps tenotomy 24     17 18 (RE-EVAL) 19 20 21 22   Manuals 3/12/24 3/14/24 3/19/24 3/21/24 3/26/24 3/28/24   STM/MFR          Joint mobs         Neuromobility         Neuro Re-Ed         Cervical retraction 2x10 seated  2x10 seated  2x10 seated 2x10 seated  2x10 seated 2x10 seated   Scap retraction/  protraction         Scap elevation/  depression         Shoulder isos 10x10s ea.  10x10s ea.  10x10s ea.  10x10s ea.  10x10s  10x10s            Ther Ex         PROM shoulder Flex, ER, abd   Flex, ER, abd Flex, ER, abd    Pendulums         Cane flexion         Cane ER 2x10 seated AAROM 2x10 seated AAROM 2x10 seated AAROM 2x10  standing AAROM     Contralateral arm w/flexion 15x5s 15x5s  20x5s  20x5s  20x5s standing 20x 5s standing   Cane abd 2x10 standing 2x10 standing 10x   10x AROM 10x   10x AROM 10x5s cane  2x10 AROM 10x 5s with cane    2x10 AROM   Table slides         Finger ladder         Pulleys 20x5s flex, abd 20x5s flex, abd 20x5s flex, abd 20x5s flex, abd 20x5s flex, abd 20x 5s flex, abd   Supine shld flex AROM 2x10 2x10  2x10 2x10  2x10     Stand flexion AROM 15x 15x 2x10  2x10  2x10  2x10   Sidelying ER     2x10 AROM 2x10 AROM standing                     Ther Activity         Pt education                           Modalities         CP R shoulder                     Access Code: [email or web address]  URL: [email or web address]  Date: 02/01/2024  Prepared by: Irvin Abdul    Exercises  - Seated Scapular Retraction  - 2-3 x daily - 7 x weekly - 2 sets - 10 reps - 5 hold  - Seated Shoulder Shrugs  - 2-3 x daily - 7 x weekly - 2 sets - 10 reps - 5 hold  - Circular Shoulder Pendulum with Table Support  - 2-3 x daily - 7 x weekly - 1 sets - 30 reps  - Standing Elbow Flexion Extension AROM  - 2-3 x daily - 7 x weekly - 2 sets - 10 reps - 5 hold  - Supine Shoulder Flexion with Dowel  - 1 x daily - 7 x weekly - 1 sets - 10 reps - 5 hold  - Supine Shoulder External Rotation with Dowel  - 1 x daily - 7 x weekly - 1 sets - 10 reps - 5 hold       Physical Therapy Protocol: Rotator Cuff Repair Immediate Therapy    Based on Moon Shoulder Protocol    Modalities: performed at the physical therapist's discretion within the guidelines of this protocol.    Wound Care: Keep the dressing clean and dry.  Light drainage may occur the first 2 days postop.  You may remove the dressings and get the incision wet in the shower 72 hours after surgery.  DO NOT remove steri-strips or sutures.  DO NOT immerse the incision under water.  Carefully pat the incision dry.  If there is wound drainage, re-apply a fresh dry gauze dressing.    Sling: wear the sling  at all times, including sleep, except for hygiene for 6 weeks following surgery. Keep the arm by the side during hygiene. The patient may remove the sling, keeping the arm by the side to perform gentle wrist and elbow range of motion. The sling may be removed for PT sessions as described below.    PHASE I (Weeks 0-4): passive range of motion       · Begin passive range of motion within 7 days following surgery. Completed 3 times per week       · Begin scapula exercises with the arm in the sling within 7 days following surgery, including scapular elevation, depression, retraction, and protraction. Completed daily Weeks 0-6       · Begin pendulum exercises at home. Completed 2 times per day    PHASE II (Weeks 4-8): active assisted range of motion       · Week 4: lying active assisted motion            o Using a stick or cane while lying flat, the nonsurgical arm moves the injured arm through different motions, including forward flexion, external rotation, and abduction. Completed daily       · Weeks 5: 45-degree active assisted motion            o Using a stick or cane while lying propped at 45 degrees, the nonsurgical arm moves the injured arm through different motions, including forward flexion, external rotation, and abduction. Completed daily       · Weeks 6-8: upright active assisted motion            o Using a stick or cane while sitting up or standing, the nonsurgical arm moves the injured arm through different motions, including forward flexion, external rotation, and abduction. Completed daily       · Week 6: scapula exercises without the sling, including scapular elevation, depression, retraction, and protraction. Completed daily    PHASE III (Weeks 8-12): active motion       · AROM: while sitting up or standing, actively forward flex and abduct the shoulders. Completed daily            o Important not to hike the shoulder up towards the ear.       · Isometric exercises: push and pull a folded towel or  pillow into a wall. Completed daily, holding push/pull for 15s with 30s rest in between for 10-15 reps            o Completed with elbow flexed at 90 degrees and with shoulder internal/external rotation    PHASE IV (Weeks 12-16): resisted exercise       · Begin rotator cuff strengthening using weights and/or elastic bands. Completed 3 times per week for 3-4 sets of 10-15 reps            o Resisted shoulder internal/external rotation            o Resisted deltoid strengthening            o Resisted scapula strengthening            o AVOID doing full can or empty can exercises       · Begin light shoulder stretching            o Hold each stretch for 15s, then rest 15s. Repeat 5 times            o After Week 16, may use aggressive stretching if needed

## 2024-04-01 ENCOUNTER — OFFICE VISIT (OUTPATIENT)
Dept: OCCUPATIONAL THERAPY | Facility: CLINIC | Age: 74
End: 2024-04-01
Payer: MEDICARE

## 2024-04-01 DIAGNOSIS — R20.0 NUMBNESS AND TINGLING IN RIGHT HAND: Primary | ICD-10-CM

## 2024-04-01 DIAGNOSIS — R20.2 NUMBNESS AND TINGLING IN RIGHT HAND: Primary | ICD-10-CM

## 2024-04-01 DIAGNOSIS — M25.641 JOINT STIFFNESS OF HAND, RIGHT: ICD-10-CM

## 2024-04-01 DIAGNOSIS — M25.631 STIFFNESS OF RIGHT WRIST JOINT: ICD-10-CM

## 2024-04-01 DIAGNOSIS — M79.89 SWELLING OF RIGHT HAND: ICD-10-CM

## 2024-04-01 PROCEDURE — 97110 THERAPEUTIC EXERCISES: CPT

## 2024-04-01 PROCEDURE — 97530 THERAPEUTIC ACTIVITIES: CPT

## 2024-04-01 NOTE — PROGRESS NOTES
"Re-Evaluation Note     Today's date: 2024  Patient name: Dominique Varela  : 1950  MRN: [id number]  Referring provider: Karolyn Valdovinos PA-C  Dx:   Encounter Diagnosis     ICD-10-CM    1. Numbness and tingling in right hand  R20.0     R20.2       2. Swelling of right hand  M79.89       3. Joint stiffness of hand, right  M25.641       4. Stiffness of right wrist joint  M25.631             Start Time: 1015  Stop Time: 1100  Total time in clinic (min): 45 minutes    Subjective: Patient notes the compression edema glove has been helping, fingers are feeling better but overall still stiff, \"I could tell when it's about to rain yesterday my knee was hurting too\".        Objective: See treatment diary below      Auth Tracker- NO AUTH REQ BOMN  Auth Status Total   Visits  Expiration date Co-Insurance   pending                                  Visit Tracker  Date 2/27 2/28 3/5 3/7 3/12 3/14    3/18 3/21 3/25 3/28  RE                                                                        PMHx:   has a past medical history of Breast cyst, GERD (gastroesophageal reflux disease), and Macular degeneration.    Precautions: [place]    Manuals HEP 2024                        Ther Ex     Education on HEP and dx  x5min   AROM tendon glides x x10   AROM table top extension x x10   AROM digit add/abduction x x10   Wrist PRE's x X10 2# 3 ways   PROM wrist flex/ext x x3 10 sec   Wrist flexor stretch   5x5 sec   PowerWeb Squeeze and rotate     Supination/pronation hammer  X10 2# wts.   Measurements          Ther Act      Towel scrunch   x5   juxtaciser     Digi flex  2X10 green   Flex bar N's/U's  X10 red   Sponges grasp and release   Groups of 5, whole bin 2 cycles, lvl 1 gripper   Digital extension   Rubberband x10 red   Putty  squeezes  X5 yellow   Dice remove with CP  Whole jar with Red clothespin        Modalities     MHP  x5 min           Assessment:   Patient tolerated session well with upgrades in therapeutic " exercises and activities. Session focused on ROM and gentle strengthening to improve deficits and functional performance with daily activities. Patient tolerated all TE/TA with minimal complaints of stiffness. Patient progressing well towards goals. Patient benefiting from skilled hand therapy OT to reduce deficits to improve independence with daily activities.        Plan:   Focus on ROM and strengthening to improve ability to complete daily activites with ease.  POC 2/27/24 - 5/7/24

## 2024-04-02 ENCOUNTER — OFFICE VISIT (OUTPATIENT)
Dept: OCCUPATIONAL THERAPY | Facility: CLINIC | Age: 74
End: 2024-04-02
Payer: MEDICARE

## 2024-04-02 ENCOUNTER — OFFICE VISIT (OUTPATIENT)
Dept: PHYSICAL THERAPY | Facility: CLINIC | Age: 74
End: 2024-04-02
Payer: MEDICARE

## 2024-04-02 DIAGNOSIS — R20.2 NUMBNESS AND TINGLING IN RIGHT HAND: Primary | ICD-10-CM

## 2024-04-02 DIAGNOSIS — M79.89 SWELLING OF RIGHT HAND: ICD-10-CM

## 2024-04-02 DIAGNOSIS — M25.641 JOINT STIFFNESS OF HAND, RIGHT: ICD-10-CM

## 2024-04-02 DIAGNOSIS — Z98.890 S/P RIGHT ROTATOR CUFF REPAIR: ICD-10-CM

## 2024-04-02 DIAGNOSIS — R20.0 NUMBNESS AND TINGLING IN RIGHT HAND: Primary | ICD-10-CM

## 2024-04-02 DIAGNOSIS — M75.121 NONTRAUMATIC COMPLETE TEAR OF RIGHT ROTATOR CUFF: Primary | ICD-10-CM

## 2024-04-02 DIAGNOSIS — M25.631 STIFFNESS OF RIGHT WRIST JOINT: ICD-10-CM

## 2024-04-02 PROCEDURE — 97112 NEUROMUSCULAR REEDUCATION: CPT

## 2024-04-02 PROCEDURE — 97110 THERAPEUTIC EXERCISES: CPT

## 2024-04-02 PROCEDURE — 97530 THERAPEUTIC ACTIVITIES: CPT

## 2024-04-02 NOTE — PROGRESS NOTES
Re-Evaluation Note     Today's date: 2024  Patient name: Dominique Varela  : 1950  MRN: [id number]  Referring provider: Karolyn Valdovinos PA-C  Dx:   Encounter Diagnosis     ICD-10-CM    1. Numbness and tingling in right hand  R20.0     R20.2       2. Joint stiffness of hand, right  M25.641       3. Swelling of right hand  M79.89       4. Stiffness of right wrist joint  M25.631                  Subjective: Patient notes overall increased soreness today since she had physical therapy session before therapy today.       Objective: See treatment diary below      Auth Tracker- NO AUTH REQ BOMN  Auth Status Total   Visits  Expiration date Co-Insurance   pending                                  Visit Tracker  Date 2/27 2/28 3/5 3/7 3/12 3/14    3/18 3/21 3/25 3/28  RE                                                                       PMHx:   has a past medical history of Breast cyst, GERD (gastroesophageal reflux disease), and Macular degeneration.    Precautions: [place]    Manuals HEP 2024                        Ther Ex     Education on HEP and dx  x5min   AROM tendon glides x x10   AROM table top extension x x10   AROM digit add/abduction x x10   Wrist PRE's x X10 2# 3 ways   PROM wrist flex/ext x x3 10 sec   Wrist flexor stretch   5x5 sec   PowerWeb Squeeze and rotate     Supination/pronation hammer  X10 2# wts.   Measurements          Ther Act      Towel scrunch   x5   juxtaciser     Digi flex  2X10 green   Flex bar N's/U's  X10 red   Sponges grasp and release   Groups of 5, whole bin 2 cycles, lvl 1 gripper   Digital extension   Rubberband x10 red   Putty  squeezes  X5 yellow   Dice remove with CP  Whole jar with Red clothespin        Modalities     MHP  x5 min           Assessment:   Patient tolerated session well with all therapeutic exercises and activities with intermittent reports of soreness. Session focused on ROM and gentle strengthening to improve deficits and functional  performance with daily activities. Patient progressing well towards goals. Patient benefiting from skilled hand therapy OT to reduce deficits to improve independence with daily activities.        Plan:   Focus on ROM and strengthening to improve ability to complete daily activites with ease.  POC 2/27/24 - 5/7/24

## 2024-04-02 NOTE — PROGRESS NOTES
Daily Note      Today's date: 2024  Patient name: Dominique Varela  : 1950  MRN: [id number]  Referring provider: Karolyn Valdovinos PA-C  Dx:   Encounter Diagnosis     ICD-10-CM    1. Nontraumatic complete tear of right rotator cuff  M75.121       2. S/P right rotator cuff repair  Z98.890             Subjective: Pt reports feeling some ache in the shoulders due to the weather. Pain is 1/10.       Objective: See treatment diary below      Assessment: Pt tolerated treatment well. Continued with established POC given stage of protocol. Pt responded well to stretches and presents with a capsular end feel, specifically into abduction. Will continue to progress per protocol as patient Is progressing into new phase last this week. Patient would benefit from continued PT to address functional mobility deficits and return to PLOF.       Plan: Continue per plan of care.  Progress treatment per protocol.         Insurance:  A/CMS Eval/ Re-eval POC expires FOTO Auth Status Co-Insurance   CMS - [facility] 1/15/24 4/15/24  None req No    2/15/24 4/15/24       3/14/24 4/30/24        1.  1/15 2.   1/17 3.    4.    5.   1 6.   2/1   7.   2/5 8.   2/8 9.   2/14 10.   2/15 RE 11.   2 12.   2   13.   2 14.   2 15.   3/5 16.   3/7 17.   3/12 18.   3/14 RE   19.   3/19 20.  3 21.  3 22.  3 23. 24.    25.  26. 27. 28. 29. 30.   31. 32.  33. 34. 35. 36.        Precautions: s/p R rotator cuff repair w/ biceps tenotomy 24     18 (RE-EVAL) 19 20 21 22 23   Manuals 3/14/24 3/19/24 3/21/24 3/26/24 3/28/24 4/2/24   STM/MFR          Joint mobs         Neuromobility         Neuro Re-Ed         Cervical retraction 2x10 seated  2x10 seated 2x10 seated  2x10 seated 2x10 seated 2x10 seated   Scap retraction/  protraction         Scap elevation/  depression         Shoulder isos 10x10s ea.  10x10s ea.  10x10s ea.  10x10s  10x10s 20x with ball  Flex, ext, ER, IR, abd             Ther Ex         PROM shoulder   Flex,  ER, abd Flex, ER, abd  Flex, ER, abd   Pendulums         Cane flexion         Cane ER 2x10 seated AAROM 2x10 seated AAROM 2x10 standing AAROM      Contralateral arm w/flexion 15x5s  20x5s  20x5s  20x5s standing 20x 5s standing 20x 5s standing w/ eccentric controlled lower       Cane abd 2x10 standing 10x   10x AROM 10x   10x AROM 10x5s cane  2x10 AROM 10x 5s with cane    2x10 AROM 10x 5s with cane    2x10 AROM   Table slides         Finger ladder         Pulleys 20x5s flex, abd 20x5s flex, abd 20x5s flex, abd 20x5s flex, abd 20x 5s flex, abd 20x 5s flex, abd   Supine shld flex AROM 2x10  2x10 2x10  2x10      Stand flexion AROM 15x 2x10  2x10  2x10  2x10    Sidelying ER    2x10 AROM 2x10 AROM standing                      Ther Activity         Pt education                           Modalities         CP R shoulder                     Access Code: [email or web address]  URL: [email or web address]  Date: 02/01/2024  Prepared by: Irvin Abdul    Exercises  - Seated Scapular Retraction  - 2-3 x daily - 7 x weekly - 2 sets - 10 reps - 5 hold  - Seated Shoulder Shrugs  - 2-3 x daily - 7 x weekly - 2 sets - 10 reps - 5 hold  - Circular Shoulder Pendulum with Table Support  - 2-3 x daily - 7 x weekly - 1 sets - 30 reps  - Standing Elbow Flexion Extension AROM  - 2-3 x daily - 7 x weekly - 2 sets - 10 reps - 5 hold  - Supine Shoulder Flexion with Dowel  - 1 x daily - 7 x weekly - 1 sets - 10 reps - 5 hold  - Supine Shoulder External Rotation with Dowel  - 1 x daily - 7 x weekly - 1 sets - 10 reps - 5 hold       Physical Therapy Protocol: Rotator Cuff Repair Immediate Therapy    Based on Moon Shoulder Protocol    Modalities: performed at the physical therapist's discretion within the guidelines of this protocol.    Wound Care: Keep the dressing clean and dry.  Light drainage may occur the first 2 days postop.  You may remove the dressings and get the incision wet in the shower 72 hours after surgery.  DO NOT remove  steri-strips or sutures.  DO NOT immerse the incision under water.  Carefully pat the incision dry.  If there is wound drainage, re-apply a fresh dry gauze dressing.    Sling: wear the sling at all times, including sleep, except for hygiene for 6 weeks following surgery. Keep the arm by the side during hygiene. The patient may remove the sling, keeping the arm by the side to perform gentle wrist and elbow range of motion. The sling may be removed for PT sessions as described below.    PHASE I (Weeks 0-4): passive range of motion       · Begin passive range of motion within 7 days following surgery. Completed 3 times per week       · Begin scapula exercises with the arm in the sling within 7 days following surgery, including scapular elevation, depression, retraction, and protraction. Completed daily Weeks 0-6       · Begin pendulum exercises at home. Completed 2 times per day    PHASE II (Weeks 4-8): active assisted range of motion       · Week 4: lying active assisted motion            o Using a stick or cane while lying flat, the nonsurgical arm moves the injured arm through different motions, including forward flexion, external rotation, and abduction. Completed daily       · Weeks 5: 45-degree active assisted motion            o Using a stick or cane while lying propped at 45 degrees, the nonsurgical arm moves the injured arm through different motions, including forward flexion, external rotation, and abduction. Completed daily       · Weeks 6-8: upright active assisted motion            o Using a stick or cane while sitting up or standing, the nonsurgical arm moves the injured arm through different motions, including forward flexion, external rotation, and abduction. Completed daily       · Week 6: scapula exercises without the sling, including scapular elevation, depression, retraction, and protraction. Completed daily    PHASE III (Weeks 8-12): active motion       · AROM: while sitting up or standing,  actively forward flex and abduct the shoulders. Completed daily            o Important not to hike the shoulder up towards the ear.       · Isometric exercises: push and pull a folded towel or pillow into a wall. Completed daily, holding push/pull for 15s with 30s rest in between for 10-15 reps            o Completed with elbow flexed at 90 degrees and with shoulder internal/external rotation    PHASE IV (Weeks 12-16): resisted exercise       · Begin rotator cuff strengthening using weights and/or elastic bands. Completed 3 times per week for 3-4 sets of 10-15 reps            o Resisted shoulder internal/external rotation            o Resisted deltoid strengthening            o Resisted scapula strengthening            o AVOID doing full can or empty can exercises       · Begin light shoulder stretching            o Hold each stretch for 15s, then rest 15s. Repeat 5 times            o After Week 16, may use aggressive stretching if needed

## 2024-04-04 ENCOUNTER — APPOINTMENT (OUTPATIENT)
Dept: PHYSICAL THERAPY | Facility: CLINIC | Age: 74
End: 2024-04-04
Payer: MEDICARE

## 2024-04-05 ENCOUNTER — OFFICE VISIT (OUTPATIENT)
Dept: OCCUPATIONAL THERAPY | Facility: CLINIC | Age: 74
End: 2024-04-05
Payer: MEDICARE

## 2024-04-05 DIAGNOSIS — R20.0 NUMBNESS AND TINGLING IN RIGHT HAND: Primary | ICD-10-CM

## 2024-04-05 DIAGNOSIS — M25.631 STIFFNESS OF RIGHT WRIST JOINT: ICD-10-CM

## 2024-04-05 DIAGNOSIS — M79.89 SWELLING OF RIGHT HAND: ICD-10-CM

## 2024-04-05 DIAGNOSIS — M25.641 JOINT STIFFNESS OF HAND, RIGHT: ICD-10-CM

## 2024-04-05 DIAGNOSIS — R20.2 NUMBNESS AND TINGLING IN RIGHT HAND: Primary | ICD-10-CM

## 2024-04-05 PROCEDURE — 97110 THERAPEUTIC EXERCISES: CPT

## 2024-04-05 PROCEDURE — 97530 THERAPEUTIC ACTIVITIES: CPT

## 2024-04-05 NOTE — PROGRESS NOTES
Daily Note    Today's date: 2024  Patient name: Dominique Varela  : 1950  MRN: [id number]  Referring provider: Karolyn Valdovinos PA-C  Dx:   Encounter Diagnosis     ICD-10-CM    1. Numbness and tingling in right hand  R20.0     R20.2       2. Joint stiffness of hand, right  M25.641       3. Swelling of right hand  M79.89       4. Stiffness of right wrist joint  M25.631                    Subjective: Patient offers no functional changes this date.       Objective: See treatment diary below      Auth Tracker- NO AUTH REQ BOMN  Auth Status Total   Visits  Expiration date Co-Insurance   pending                                  Visit Tracker  Date 2/27 2/28 3/5 3/7 3/12 3/14    3/18 3/21 3/25 3/28  RE                                                                   PMHx:   has a past medical history of Breast cyst, GERD (gastroesophageal reflux disease), and Macular degeneration.    Precautions: [place]    Manuals HEP 2024                        Ther Ex     Education on HEP and dx  x5min   AROM tendon glides x    AROM table top extension x    AROM digit add/abduction x    Wrist PRE's x X15 2# 3 ways   OTS assisted PROM wrist flex/ext & digit IP flexion/ext x x5min   Wrist extension stretch w/ powerweb  Red x10   PowerWeb digit flexion  Red 2x10   Supination/pronation hammer  X10 2# wts.   AAROM wrist flex/ext stretch w/ ball  X15    Powerweb wrist extension stretch  X15 red    Ther Act      Towel scrunch   X10; full fist hold at end   juxtaciser     Digi flex     Flex bar N's/U's  X15 red   Sponges grasp and release   Lvl 2 gripper   Digital extension      Putty  squeezes  X3min yellow   Dice remove with CP  Whole jar with Red clothespin   Weight bearing w/ ball  X10 prolonged hold   Modalities     MHP  x5 min           Assessment:   Patient tolerated session well with all PROM, therapeutic exercises and activities with no reports of pain. Session focused on ROM, gentle strengthening &  weight bearing to improve deficits and functional performance with daily activities. Pt unable to make full composite fist w/ R hand. HEP upgraded this date to include theraputty gripping to facilitate pt's ability to perform digit flexion. Pt provided w/ all necessary materials. Patient progressing well towards goals. Patient benefiting from skilled hand therapy OT to reduce deficits to improve independence with daily activities.     Plan:   Focus on ROM and strengthening to improve ability to complete daily activites with ease.  POC 2/27/24 - 5/7/24     Patient treated by ERROL Hansen under my direct supervision.

## 2024-04-08 ENCOUNTER — CONSULT (OUTPATIENT)
Dept: PAIN MEDICINE | Facility: CLINIC | Age: 74
End: 2024-04-08
Payer: MEDICARE

## 2024-04-08 VITALS
HEIGHT: 64 IN | WEIGHT: 227 LBS | SYSTOLIC BLOOD PRESSURE: 138 MMHG | HEART RATE: 73 BPM | BODY MASS INDEX: 38.76 KG/M2 | DIASTOLIC BLOOD PRESSURE: 87 MMHG

## 2024-04-08 DIAGNOSIS — M75.121 NONTRAUMATIC COMPLETE TEAR OF RIGHT ROTATOR CUFF: ICD-10-CM

## 2024-04-08 DIAGNOSIS — G90.511 COMPLEX REGIONAL PAIN SYNDROME TYPE 1 OF RIGHT UPPER EXTREMITY: ICD-10-CM

## 2024-04-08 DIAGNOSIS — G56.11 RIGHT MEDIAN NERVE NEUROPATHY: Primary | ICD-10-CM

## 2024-04-08 PROCEDURE — 99204 OFFICE O/P NEW MOD 45 MIN: CPT | Performed by: STUDENT IN AN ORGANIZED HEALTH CARE EDUCATION/TRAINING PROGRAM

## 2024-04-08 RX ORDER — CAPSAICIN 0.75 MG/G
CREAM TOPICAL 3 TIMES DAILY PRN
Qty: 120 G | Refills: 0 | Status: SHIPPED | OUTPATIENT
Start: 2024-04-08 | End: 2024-05-08

## 2024-04-08 NOTE — PROGRESS NOTES
Assessment:  1. Right median nerve neuropathy    2. Complex regional pain syndrome type 1 of right upper extremity    3. Nontraumatic complete tear of right rotator cuff    Patient is a pleasant 73-year-old woman who presents as a new patient to me after being referred by Dr. Jared Keys for concern for complex regional pain syndrome in the right upper extremity in the setting of a prior right shoulder rotator cuff repair and biceps tenotomy.  Over the past month the intensity pain has been mild and she rates the pain currently as a 1 out of 10 on numeric rating scale.  Pain is occurring constantly, 100% of the time and there is no pattern of when symptoms are better or worse.  She describes pain as shooting, numbing, sharp, stiffness with some associated weakness in upper extremities.  She does not use any assistive devices.  Patient to specific complaints is that she cannot open cans or twist to space with her operative hand she has been doing occupational therapy for about 4 weeks with minimal improvement.  Patient also reporting some skin changes compared to her other hand along with swelling.  She denies any temperature changes.  Prior pain treatments include physical therapy provided moderate relief, heat which provides moderate relief and chiropractic and ablation which was mild relief.  Current medications include Tylenol.    Patient symptoms have continued to improve with physical therapy and Occupational Therapy.  On examination patient with minimal findings of swelling in the right hand compared to the left hand with some reports of temperature changes that she reports the right upper extremity gets cold compared to the left.  Patient does not meet criteria for complex regional pain syndrome at this time and her symptoms seem more in line with right median nerve neuropathy rather than complex regional pain syndrome.  To further evaluate this we will get an EMG of the right upper extremity.  Pending  findings can consider referral to orthopedic hand.  For symptomatic relief in the interim we will prescribe topical capsaicin ointment.    [place] Criteria for Complex Regional Pain Syndrome  []  Continuing pain, which is disproportionate to any inciting event  Reports at least one symptom in 3/4 of categories (more stringent research criteria require at least one symptom in all four categories):  1. Sensory: Reports of  [] Hyperesthesia and/or   [] Allodynia  2. Vasomotor: Reports of  [x] Skin temperature asymmetry and/or   [] Skin color changes and/or   [] Skin color asymmetry  3. Sudomotor/edema: Reports of  [] Edema and/or   [] Sweating changes and/or   [] Sweating asymmetry  4 Motor/trophic: Reports of  [x] Decreased range of motion and/or   [] Motor dysfunction (weakness, tremor, dystonia) and/or   [] Trophic changes (hair, nail, skin)  Display at least one sign at the time of evaluation in 2/4 of categories (the more stringent research criteria require at least one sign in three of the four categories):  1. Sensory: Evidence of  [] Hyperalgesia (to pinprick) and/or   [] Allodynia (to light touch and/or   [] Temperature sensation and/or deep somatic pressure and/or joint movement)  2. Vasomotor: Evidence of  [] Temperature asymmetry (>1°C) and/or   [] Skin color changes and/or   [] Asymmetry  3. Sudomotor/edema: Evidence of  [] Edema and/or  [] Sweating changes and/or   [] Sweating asymmetry  4. Motor/trophic: Evidence of  [x] Decreased range of motion and/or   [] Motor dysfunction (weakness, tremor, dystonia) and/or   [] Trophic changes (hair, nail, skin)    CRPS Final Assessment:  Is there any other diagnosis that better explains the signs and symptoms?  [x] Yes  [] No    Does patient meet the [place] Criteria for CRPS:  [] Yes  [x] No      Plan:  Topical capsaicin 0.75%   EMG 2 limb upper extremities. Pending EMG findings can consider referral to orthopedic hand   Continue occupational therapy and  physical therapy   If EMG findings unremarkable, can consider triple phase bone scan to evaluate for CRPS  Orders Placed This Encounter   Procedures   • EMG 2 Limb Upper Extremity     Standing Status:   Future     Standing Expiration Date:   4/8/2025     Order Specific Question:   Possible Diagnosis:     Answer:   median neuropathy (carpal tunnel syndrome)       New Medications Ordered This Visit   Medications   • capsicum (ZOSTRIX) 0.075 % topical cream     Sig: Apply topically 3 (three) times a day as needed (for nerve pain)     Dispense:  120 g     Refill:  0       My impressions and treatment recommendations were discussed in detail with the patient, who verbalized understanding and had no further questions.    Follow-up is planned in six weeks time or sooner as warranted.  Discharge instructions were provided. I personally saw and examined the patient and I agree with the above discussed plan of care.    History of Present Illness:    Dominique Varela is a 73 y.o. female who presents to [facility] for initial evaluation of the above stated pain complaints. The patient has a past medical and chronic pain history as outlined in the assessment section. She was referred by Karolyn Valdovinos PA-C  [address], [address]  [place] [address].    Patient is a pleasant 73-year-old woman who presents as a new patient to me after being referred by Dr. Jared Keys for concern for complex regional pain syndrome in the right upper extremity in the setting of a prior right shoulder rotator cuff repair and biceps tenotomy.  Over the past month the intensity pain has been mild and she rates the pain currently as a 1 out of 10 on numeric rating scale.  Pain is occurring constantly, 100% of the time and there is no pattern of when symptoms are better or worse.  She describes pain as shooting, numbing, sharp, stiffness with some associated weakness in upper extremities.  She  does not use any assistive devices.  Patient to specific complaints is that she cannot open cans or twist to space with her operative hand she has been doing occupational therapy for about 4 weeks with minimal improvement.  Patient also reporting some skin changes compared to her other hand along with swelling.  She denies any temperature changes.  Prior pain treatments include physical therapy provided moderate relief, heat which provides moderate relief and chiropractic and ablation which was mild relief.  Current medications include Tylenol.    Review of Systems:    Review of Systems   Constitutional:  Negative for chills and fatigue.   HENT:  Negative for ear pain, mouth sores and sinus pressure.    Eyes:  Positive for visual disturbance. Negative for pain and redness.   Respiratory:  Negative for shortness of breath and wheezing.    Cardiovascular:  Negative for chest pain and palpitations.   Gastrointestinal:  Negative for abdominal pain and nausea.   Endocrine: Negative for polyphagia.   Musculoskeletal:  Positive for joint swelling. Negative for arthralgias, back pain and neck pain.        Decreased ROM, joint and muscle pain in the right hand   Skin:  Negative for wound.   Neurological:  Positive for weakness and numbness. Negative for seizures.   Psychiatric/Behavioral:  Positive for sleep disturbance. Negative for dysphoric mood.            Past Medical History:   Diagnosis Date   • Breast cyst    • GERD (gastroesophageal reflux disease)     tums only   • Macular degeneration     left eye       Past Surgical History:   Procedure Laterality Date   • BREAST CYST EXCISION Right     2 rt breast cysts removed   •  SECTION      and    • CHOLECYSTECTOMY     • COLONOSCOPY     • IN SURGICAL ARTHROSCOPY SHOULDER LMTD DBRDMT  Right 2024    Procedure: Right shoulder arthroscopic rotator cuff repair, biceps tenotomy, Extensive Debridement;  Surgeon: Everette Keys MD;   Location: [facility] OR;  Service: Orthopedics       Family History   Problem Relation Age of Onset   • Heart disease Father    • Breast cancer Other         unkown age   • No Known Problems Sister    • No Known Problems Daughter    • No Known Problems Maternal Grandmother    • No Known Problems Paternal Grandmother    • No Known Problems Maternal Aunt    • No Known Problems Maternal Aunt    • No Known Problems Paternal Aunt        Social History     Occupational History   • Not on file   Tobacco Use   • Smoking status: Never     Passive exposure: Never   • Smokeless tobacco: Never   Vaping Use   • Vaping status: Never Used   Substance and Sexual Activity   • Alcohol use: No   • Drug use: No   • Sexual activity: Not on file         Current Outpatient Medications:   •  acetaminophen (TYLENOL) 500 mg tablet, Take 2 tablets (1,000 mg total) by mouth every 8 (eight) hours, Disp: 60 tablet, Rfl: 0  •  capsicum (ZOSTRIX) 0.075 % topical cream, Apply topically 3 (three) times a day as needed (for nerve pain), Disp: 120 g, Rfl: 0  •  Coenzyme Q10 (CoQ10) 50 MG CAPS, Take by mouth, Disp: , Rfl:   •  Lutein 40 MG CAPS, Take by mouth, Disp: , Rfl:   •  Multiple Vitamins-Minerals (MULTIVITAMIN WITH MINERALS) tablet, Take 1 tablet by mouth daily, Disp: , Rfl:   •  glucosamine-chondroitin 500-400 MG tablet, Take 1 tablet by mouth 3 (three) times a day (Patient not taking: Reported on 1/15/2024), Disp: , Rfl:   •  naproxen (Naprosyn) 500 mg tablet, Take 1 tablet (500 mg total) by mouth 2 (two) times a day with meals (Patient not taking: Reported on 1/15/2024), Disp: 20 tablet, Rfl: 0  •  ondansetron (ZOFRAN) 4 mg tablet, Take 1 tablet (4 mg total) by mouth every 8 (eight) hours as needed for nausea or vomiting (Patient not taking: Reported on 1/15/2024), Disp: 20 tablet, Rfl: 0  •  tolterodine (DETROL LA) 2 mg 24 hr capsule, Take 2 mg by mouth daily, Disp: , Rfl:     No Known Allergies    Physical Exam:    /87   Pulse 73   Ht  "5' 4\" (1.626 m)   Wt 103 kg (227 lb)   BMI 38.96 kg/m²     Constitutional: normal, well developed, well nourished, alert, in no distress and non-toxic and no overt pain behavior.  Eyes: anicteric  HEENT: grossly intact  Neck: supple, symmetric, trachea midline and no masses   Pulmonary:even and unlabored  Cardiovascular:No edema or pitting edema present  Skin:Normal without rashes or lesions and well hydrated  Psychiatric:Mood and affect appropriate  Neurologic:Cranial Nerves II-XII grossly intact  Musculoskeletal:normal gait. Reduced  strength in right hand compared to left. +Tinel's on the right. No swelling, skin changes, or temperature changes appreciated on examination today.     Imaging  MRI R shoulder 12/3/2023  MRI SHOULDER RIGHT WO CONTRAST     INDICATION:   M24.811: Other specific joint derangements of right shoulder, not elsewhere classified.     COMPARISON: X-ray right shoulder dated October 9, 2023     TECHNIQUE:  Multiplanar/multisequence MR of the right shoulder was performed.        FINDINGS:     SUBCUTANEOUS TISSUES: Normal     JOINT EFFUSION: There is a small glenohumeral joint effusion.     ACROMION PROCESS: Normal.     ROTATOR CUFF:  - Complete (full-thickness and full width) supraspinatus insertional tear at the footprint, with 1.4 cm tendon retraction (series 6 images 8-12, series 7 images 23-25). No muscle atrophy.     - Mild infraspinatus tendinosis with low-grade interstitial tear (7/19).     - Subscapularis tendinosis with low-grade interstitial tear resulting in medial subluxation of long head of biceps which is perched over the lesser tuberosity (series 3 images 11-16).     - Teres minor is intact.        SUBACROMIAL/SUBDELTOID BURSA: Small volume bursal fluid secondary to the rotator cuff tear.     LONG HEAD OF BICEPS TENDON: Medially subluxed long head of biceps with moderate tendinosis. No discrete tear. Small volume fluid within the tendon sheath, likely secondary to " communication with the joint fluid.     GLENOID LABRUM: Degenerative changes of the superior labrum.     GLENOHUMERAL JOINT: Intact.     ACROMIOCLAVICULAR JOINT: There is mild osteoarthritis.     BONES: Normal.     IMPRESSION:     1. Complete (full-thickness and full width) supraspinatus insertional tear, with 1.4 cm tendon retraction. No muscle atrophy.     2. Mild infraspinatus tendinosis with low-grade interstitial tear.     3. Subscapularis tendinosis with low-grade interstitial tear resulting in medial subluxation of long head of biceps which is perched over the lesser tuberosity.     4. Medially subluxed long head of biceps with moderate tendinosis.     Workstation performed: [id number]     No orders to display       Orders Placed This Encounter   Procedures   • EMG 2 Limb Upper Extremity

## 2024-04-09 ENCOUNTER — OFFICE VISIT (OUTPATIENT)
Dept: PHYSICAL THERAPY | Facility: CLINIC | Age: 74
End: 2024-04-09
Payer: MEDICARE

## 2024-04-09 ENCOUNTER — OFFICE VISIT (OUTPATIENT)
Dept: OCCUPATIONAL THERAPY | Facility: CLINIC | Age: 74
End: 2024-04-09
Payer: MEDICARE

## 2024-04-09 DIAGNOSIS — R20.0 NUMBNESS AND TINGLING IN RIGHT HAND: Primary | ICD-10-CM

## 2024-04-09 DIAGNOSIS — Z98.890 S/P RIGHT ROTATOR CUFF REPAIR: ICD-10-CM

## 2024-04-09 DIAGNOSIS — M25.641 JOINT STIFFNESS OF HAND, RIGHT: ICD-10-CM

## 2024-04-09 DIAGNOSIS — R20.2 NUMBNESS AND TINGLING IN RIGHT HAND: Primary | ICD-10-CM

## 2024-04-09 DIAGNOSIS — M79.89 SWELLING OF RIGHT HAND: ICD-10-CM

## 2024-04-09 DIAGNOSIS — M25.631 STIFFNESS OF RIGHT WRIST JOINT: ICD-10-CM

## 2024-04-09 DIAGNOSIS — M75.121 NONTRAUMATIC COMPLETE TEAR OF RIGHT ROTATOR CUFF: Primary | ICD-10-CM

## 2024-04-09 PROCEDURE — 97112 NEUROMUSCULAR REEDUCATION: CPT

## 2024-04-09 PROCEDURE — 97530 THERAPEUTIC ACTIVITIES: CPT

## 2024-04-09 PROCEDURE — 97110 THERAPEUTIC EXERCISES: CPT

## 2024-04-09 NOTE — PROGRESS NOTES
Daily Note    Today's date: 2024  Patient name: Dominique Varela  : 1950  MRN: [id number]  Referring provider: Karolyn Valdovinos PA-C  Dx:   Encounter Diagnosis     ICD-10-CM    1. Numbness and tingling in right hand  R20.0     R20.2       2. Joint stiffness of hand, right  M25.641       3. Swelling of right hand  M79.89       4. Stiffness of right wrist joint  M25.631                      Subjective: Patient offers no functional changes this date.       Objective: See treatment diary below      Auth Tracker- NO AUTH REQ BOMN  Auth Status Total   Visits  Expiration date Co-Insurance   pending                                  Visit Tracker  Date 2/27 2/28 3/5 3/7 3/12 3/14    3/18 3/21 3/25 3/28  RE                                                                  PMHx:   has a past medical history of Breast cyst, GERD (gastroesophageal reflux disease), and Macular degeneration.    Precautions: [place]    Manuals HEP 2024                        Ther Ex     Education on HEP and dx  x5min   AROM tendon glides x    AROM table top extension x    AROM digit add/abduction x    Wrist PRE's x X15 2# 3 ways   Therapist assisted PROM wrist flex/ext & digit IP flexion/ext x x5min   Wrist extension stretch w/ powerweb  Red x10   PowerWeb digit flexion  Red 2x10   Supination/pronation hammer  X10 2# wts.   AAROM wrist flex/ext stretch w/ ball  X15              Ther Act      Towel scrunch   X10; full fist hold at end   Juxtaciser  x5   Digi flex  Green x10    Flex bar N's/U's  X15 red   Sponges grasp and release   Lvl 2 gripper   Digital extension      Putty  squeezes  X3min yellow   Dice remove with CP  Whole jar with reverse Red clothespin   Weight bearing w/ ball  X10 prolonged hold   Modalities     MHP  x5 min           Assessment:   Patient tolerated session well with all PROM, therapeutic exercises and activities with no reports of pain. Session focused on ROM, gentle strengthening & weight  bearing to improve deficits and functional performance with daily activities. Pt able to make improved composite fist w/ R hand.  Patient progressing well towards goals. Patient benefiting from skilled hand therapy OT to reduce deficits to improve independence with daily activities.     Plan:   Focus on ROM and strengthening to improve ability to complete daily activites with ease.  POC 2/27/24 - 5/7/24

## 2024-04-09 NOTE — PROGRESS NOTES
Daily Note      Today's date: 2024  Patient name: Dominique Varela  : 1950  MRN: [id number]  Referring provider: Karolyn Valdovinos PA-C  Dx:   Encounter Diagnosis     ICD-10-CM    1. Nontraumatic complete tear of right rotator cuff  M75.121       2. S/P right rotator cuff repair  Z98.890           Subjective: Pt reports that she saw pain management specialist about her hand and was ordered an EMG study for her hand. Pt states she was also prescribed a capsaicin cream for her hand which she has picked up today but has not used yet. Pt states her shoulder is improving.        Objective: See treatment diary below    Assessment: Pt tolerated treatment well.  Pt is now 13 weeks and 5 days post-op and was progressed to the resisted exercise phase. Pt noted no issue with rows, extensions, and theraband IR and ER. Pt demonstrates good familiarity with these exercises from rehabilitation at this facility prior to surgery. Pt continues to note difficulty and limited AROM with shoulder abduction.     Plan: Continue per plan of care.  Continue to progress strengthening.          Insurance:  AMA/CMS Eval/ Re-eval POC expires FOTO Auth Status Co-Insurance   CMS - [facility] 1/15/24 4/15/24  None req No    2/15/24 4/15/24       3/14/24 4/30/24        1.  1/15 2.   1 3.   1 4.    5.    6.   2/1   7.   2/5 8.   2/8 9.   214 10.   2/15 RE 11.   2 12.   2   13.   2 14.   2 15.   3/5 16.   3/7 17.   3/12 18.   3/14 RE   19.   3/19 20.  3/21 21.  3/ 22.  3/ 23.  4/2 24.   4/9   25.  26. 27. 28. 29. 30.   31. 32.  33. 34. 35. 36.        Precautions: s/p R rotator cuff repair w/ biceps tenotomy 24     21 22 23 24     Manuals 3/26/24 3/28/24 4/2/24 4/9/24     STM/MFR          Joint mobs         Neuromobility         Neuro Re-Ed         Cervical retraction 2x10 seated 2x10 seated 2x10 seated      Rows    2x10 RTB inc    Shoulder extensions    2x10 RTB     Shoulder isos 10x10s  10x10s 20x with ball  Flex,  ext, ER, IR, abd                Ther Ex         PROM shoulder Flex, ER, abd  Flex, ER, abd Flex, ER, abd     Pendulums         Cane flexion         Cane ER         Flexion AAROM w/ hands clasped 20x5s standing 20x 5s standing 20x 5s standing w/ eccentric controlled lower     20x5s standing w/ ecc lowering     Cane abd 10x5s cane  2x10 AROM 10x 5s with cane    2x10 AROM 10x 5s with cane    2x10 AROM 10x5 with cane    2x10 AROM     Table slides         Finger ladder         Pulleys 20x5s flex, abd 20x 5s flex, abd 20x 5s flex, abd 20x5s flex, abd     Supine shld flex AROM 2x10         Stand flexion AROM 2x10  2x10       Sidelying ER 2x10 AROM 2x10 AROM standing  2x10 sidelying     Tband IR    2x10 GTB     Tband ER    2x10 YTB     Behind the back stretch    5x15s      Ther Activity         Pt education                           Modalities         CP R shoulder                     Access Code: [email or web address]  URL: [email or web address]  Date: 02/01/2024  Prepared by: Irvin Abdul    Exercises  - Seated Scapular Retraction  - 2-3 x daily - 7 x weekly - 2 sets - 10 reps - 5 hold  - Seated Shoulder Shrugs  - 2-3 x daily - 7 x weekly - 2 sets - 10 reps - 5 hold  - Circular Shoulder Pendulum with Table Support  - 2-3 x daily - 7 x weekly - 1 sets - 30 reps  - Standing Elbow Flexion Extension AROM  - 2-3 x daily - 7 x weekly - 2 sets - 10 reps - 5 hold  - Supine Shoulder Flexion with Dowel  - 1 x daily - 7 x weekly - 1 sets - 10 reps - 5 hold  - Supine Shoulder External Rotation with Dowel  - 1 x daily - 7 x weekly - 1 sets - 10 reps - 5 hold       Physical Therapy Protocol: Rotator Cuff Repair Immediate Therapy    Based on Moon Shoulder Protocol    Modalities: performed at the physical therapist's discretion within the guidelines of this protocol.    Wound Care: Keep the dressing clean and dry.  Light drainage may occur the first 2 days postop.  You may remove the dressings and get the incision wet in the shower 72  hours after surgery.  DO NOT remove steri-strips or sutures.  DO NOT immerse the incision under water.  Carefully pat the incision dry.  If there is wound drainage, re-apply a fresh dry gauze dressing.    Sling: wear the sling at all times, including sleep, except for hygiene for 6 weeks following surgery. Keep the arm by the side during hygiene. The patient may remove the sling, keeping the arm by the side to perform gentle wrist and elbow range of motion. The sling may be removed for PT sessions as described below.    PHASE I (Weeks 0-4): passive range of motion       · Begin passive range of motion within 7 days following surgery. Completed 3 times per week       · Begin scapula exercises with the arm in the sling within 7 days following surgery, including scapular elevation, depression, retraction, and protraction. Completed daily Weeks 0-6       · Begin pendulum exercises at home. Completed 2 times per day    PHASE II (Weeks 4-8): active assisted range of motion       · Week 4: lying active assisted motion            o Using a stick or cane while lying flat, the nonsurgical arm moves the injured arm through different motions, including forward flexion, external rotation, and abduction. Completed daily       · Weeks 5: 45-degree active assisted motion            o Using a stick or cane while lying propped at 45 degrees, the nonsurgical arm moves the injured arm through different motions, including forward flexion, external rotation, and abduction. Completed daily       · Weeks 6-8: upright active assisted motion            o Using a stick or cane while sitting up or standing, the nonsurgical arm moves the injured arm through different motions, including forward flexion, external rotation, and abduction. Completed daily       · Week 6: scapula exercises without the sling, including scapular elevation, depression, retraction, and protraction. Completed daily    PHASE III (Weeks 8-12): active motion       · AROM:  while sitting up or standing, actively forward flex and abduct the shoulders. Completed daily            o Important not to hike the shoulder up towards the ear.       · Isometric exercises: push and pull a folded towel or pillow into a wall. Completed daily, holding push/pull for 15s with 30s rest in between for 10-15 reps            o Completed with elbow flexed at 90 degrees and with shoulder internal/external rotation    PHASE IV (Weeks 12-16): resisted exercise       · Begin rotator cuff strengthening using weights and/or elastic bands. Completed 3 times per week for 3-4 sets of 10-15 reps            o Resisted shoulder internal/external rotation            o Resisted deltoid strengthening            o Resisted scapula strengthening            o AVOID doing full can or empty can exercises       · Begin light shoulder stretching            o Hold each stretch for 15s, then rest 15s. Repeat 5 times            o After Week 16, may use aggressive stretching if needed

## 2024-04-10 ENCOUNTER — APPOINTMENT (OUTPATIENT)
Dept: OCCUPATIONAL THERAPY | Facility: CLINIC | Age: 74
End: 2024-04-10
Payer: MEDICARE

## 2024-04-11 ENCOUNTER — OFFICE VISIT (OUTPATIENT)
Dept: OCCUPATIONAL THERAPY | Facility: CLINIC | Age: 74
End: 2024-04-11
Payer: MEDICARE

## 2024-04-11 ENCOUNTER — OFFICE VISIT (OUTPATIENT)
Dept: PHYSICAL THERAPY | Facility: CLINIC | Age: 74
End: 2024-04-11
Payer: MEDICARE

## 2024-04-11 DIAGNOSIS — M79.89 SWELLING OF RIGHT HAND: ICD-10-CM

## 2024-04-11 DIAGNOSIS — M25.631 STIFFNESS OF RIGHT WRIST JOINT: ICD-10-CM

## 2024-04-11 DIAGNOSIS — Z98.890 S/P RIGHT ROTATOR CUFF REPAIR: ICD-10-CM

## 2024-04-11 DIAGNOSIS — M75.121 NONTRAUMATIC COMPLETE TEAR OF RIGHT ROTATOR CUFF: Primary | ICD-10-CM

## 2024-04-11 DIAGNOSIS — R20.0 NUMBNESS AND TINGLING IN RIGHT HAND: Primary | ICD-10-CM

## 2024-04-11 DIAGNOSIS — R20.2 NUMBNESS AND TINGLING IN RIGHT HAND: Primary | ICD-10-CM

## 2024-04-11 DIAGNOSIS — M25.641 JOINT STIFFNESS OF HAND, RIGHT: ICD-10-CM

## 2024-04-11 PROCEDURE — 97110 THERAPEUTIC EXERCISES: CPT

## 2024-04-11 PROCEDURE — 97530 THERAPEUTIC ACTIVITIES: CPT

## 2024-04-11 PROCEDURE — 97112 NEUROMUSCULAR REEDUCATION: CPT

## 2024-04-11 NOTE — PROGRESS NOTES
Daily Note      Today's date: 2024  Patient name: Dominique Varela  : 1950  MRN: [id number]  Referring provider: Karolyn Valdovinos PA-C  Dx:   Encounter Diagnosis     ICD-10-CM    1. Nontraumatic complete tear of right rotator cuff  M75.121       2. S/P right rotator cuff repair  Z98.890           Subjective: Pt reports no new complaints.        Objective: See treatment diary below    Assessment: Pt tolerated treatment well.  Pt demonstrates improved shoulder flexion and abduction AROM maximum in standing, which decreased with repetitions due to fatigue. Pt continued with shoulder IR and ER and noted most fatigue with shoulder ER with theraband.     Plan: Continue per plan of care.           Insurance:  AMA/CMS Eval/ Re-eval POC expires FOTO Auth Status Co-Insurance   CMS - [facility] 1/15/24 4/15/24  None req No    2/15/24 4/15/24       3/14/24 4/30/24        1.  1/15 2.   1 3.    4.    5.   1 6.   2/   7.   2/5 8.   2/8 9.   2/14 10.   2/15 RE 11.   2/ 12.   2/   13.   2/ 14.   2/ 15.   3/5 16.   3/7 17.   3/12 18.   3/14 RE   19.   3/19 20.  3/21 21.  3/ 22.  3/28 23.  4/2 24.   4/9   25.   4/11 26. 27. 28. 29. 30.   31. 32.  33. 34. 35. 36.        Precautions: s/p R rotator cuff repair w/ biceps tenotomy 24     21 22 23 24 25    Manuals 3/26/24 3/28/24 4/2/24 4/9/24 4/11/24    STM/MFR          Joint mobs         Neuromobility         Neuro Re-Ed         Cervical retraction 2x10 seated 2x10 seated 2x10 seated      Rows    2x10 RTB 2x10 GTB    Shoulder extensions    2x10 RTB 2x10 GTB    Shoulder isos 10x10s  10x10s 20x with ball  Flex, ext, ER, IR, abd                                  Ther Ex         PROM shoulder Flex, ER, abd  Flex, ER, abd Flex, ER, abd     Pendulums         Cane flexion         Cane ER         Flexion AAROM w/ hands clasped 20x5s standing 20x 5s standing 20x 5s standing w/ eccentric controlled lower     20x5s standing w/ ecc lowering 20x5s standing w/ ecc lowering     Cane abd 10x5s cane  2x10 AROM 10x 5s with cane    2x10 AROM 10x 5s with cane    2x10 AROM 10x5 with cane    2x10 AROM 15x5s with cane    2x10 AROM    Table slides         Finger ladder         Pulleys 20x5s flex, abd 20x 5s flex, abd 20x 5s flex, abd 20x5s flex, abd 20x5s flex, abd    Supine shld flex AROM 2x10         Stand flexion AROM 2x10  2x10       Sidelying ER 2x10 AROM 2x10 AROM standing  2x10 sidelying     Tband IR    2x10 GTB 2x10 GTB    Tband ER    2x10 YTB 2x10 YTB    Behind the back stretch    5x15s  5x15s     Ther Activity         Pt education                           Modalities         CP R shoulder                     Access Code: [email or web address]  URL: [email or web address]  Date: 02/01/2024  Prepared by: Irvin Abdul    Exercises  - Seated Scapular Retraction  - 2-3 x daily - 7 x weekly - 2 sets - 10 reps - 5 hold  - Seated Shoulder Shrugs  - 2-3 x daily - 7 x weekly - 2 sets - 10 reps - 5 hold  - Circular Shoulder Pendulum with Table Support  - 2-3 x daily - 7 x weekly - 1 sets - 30 reps  - Standing Elbow Flexion Extension AROM  - 2-3 x daily - 7 x weekly - 2 sets - 10 reps - 5 hold  - Supine Shoulder Flexion with Dowel  - 1 x daily - 7 x weekly - 1 sets - 10 reps - 5 hold  - Supine Shoulder External Rotation with Dowel  - 1 x daily - 7 x weekly - 1 sets - 10 reps - 5 hold       Physical Therapy Protocol: Rotator Cuff Repair Immediate Therapy    Based on [facility] Shoulder Protocol    Modalities: performed at the physical therapist's discretion within the guidelines of this protocol.    Wound Care: Keep the dressing clean and dry.  Light drainage may occur the first 2 days postop.  You may remove the dressings and get the incision wet in the shower 72 hours after surgery.  DO NOT remove steri-strips or sutures.  DO NOT immerse the incision under water.  Carefully pat the incision dry.  If there is wound drainage, re-apply a fresh dry gauze dressing.    Sling: wear the sling at all times,  including sleep, except for hygiene for 6 weeks following surgery. Keep the arm by the side during hygiene. The patient may remove the sling, keeping the arm by the side to perform gentle wrist and elbow range of motion. The sling may be removed for PT sessions as described below.    PHASE I (Weeks 0-4): passive range of motion       · Begin passive range of motion within 7 days following surgery. Completed 3 times per week       · Begin scapula exercises with the arm in the sling within 7 days following surgery, including scapular elevation, depression, retraction, and protraction. Completed daily Weeks 0-6       · Begin pendulum exercises at home. Completed 2 times per day    PHASE II (Weeks 4-8): active assisted range of motion       · Week 4: lying active assisted motion            o Using a stick or cane while lying flat, the nonsurgical arm moves the injured arm through different motions, including forward flexion, external rotation, and abduction. Completed daily       · Weeks 5: 45-degree active assisted motion            o Using a stick or cane while lying propped at 45 degrees, the nonsurgical arm moves the injured arm through different motions, including forward flexion, external rotation, and abduction. Completed daily       · Weeks 6-8: upright active assisted motion            o Using a stick or cane while sitting up or standing, the nonsurgical arm moves the injured arm through different motions, including forward flexion, external rotation, and abduction. Completed daily       · Week 6: scapula exercises without the sling, including scapular elevation, depression, retraction, and protraction. Completed daily    PHASE III (Weeks 8-12): active motion       · AROM: while sitting up or standing, actively forward flex and abduct the shoulders. Completed daily            o Important not to hike the shoulder up towards the ear.       · Isometric exercises: push and pull a folded towel or pillow into a wall.  Completed daily, holding push/pull for 15s with 30s rest in between for 10-15 reps            o Completed with elbow flexed at 90 degrees and with shoulder internal/external rotation    PHASE IV (Weeks 12-16): resisted exercise       · Begin rotator cuff strengthening using weights and/or elastic bands. Completed 3 times per week for 3-4 sets of 10-15 reps            o Resisted shoulder internal/external rotation            o Resisted deltoid strengthening            o Resisted scapula strengthening            o AVOID doing full can or empty can exercises       · Begin light shoulder stretching            o Hold each stretch for 15s, then rest 15s. Repeat 5 times            o After Week 16, may use aggressive stretching if needed

## 2024-04-11 NOTE — PROGRESS NOTES
Daily Note    Today's date: 2024  Patient name: Dominique Varela  : 1950  MRN: [id number]  Referring provider: Karolyn Valdovinos PA-C  Dx:   Encounter Diagnosis     ICD-10-CM    1. Numbness and tingling in right hand  R20.0     R20.2       2. Joint stiffness of hand, right  M25.641       3. Stiffness of right wrist joint  M25.631       4. Swelling of right hand  M79.89                      Subjective: Patient offers no functional changes this date.       Objective: See treatment diary below      Auth Tracker- NO AUTH REQ BOMN  Auth Status Total   Visits  Expiration date Co-Insurance   pending                                  Visit Tracker  Date 2/27 2/28 3/5 3/7 3/12 3/14    3/18 3/21 3/25 3/28  RE                                                                 PMHx:   has a past medical history of Breast cyst, GERD (gastroesophageal reflux disease), and Macular degeneration.    Precautions: [place]    Manuals HEP 2024                        Ther Ex     Education on HEP and dx  x5min   AROM tendon glides x    AROM table top extension x    AROM digit add/abduction x    Wrist PRE's x X15 2# 3 ways   Therapist assisted PROM wrist flex/ext & digit IP flexion/ext x x5min   Wrist extension stretch w/ powerweb  Red x10   PowerWeb digit flexion  Red 2x10   Supination/pronation hammer  X10 2# wts.   AAROM wrist flex/ext stretch w/ ball  X15              Ther Act      Towel scrunch   X10; full fist hold at end   Juxtaciser  x5   Digi flex  Green x10    Flex bar N's/U's  X15 red   Sponges grasp and release   Lvl 2 gripper   Digital extension      Putty  squeezes  X3min yellow   Dice remove with CP  Whole jar with reverse Red clothespin   Weight bearing w/ ball  X10 prolonged hold   Modalities     MHP  x5 min           Assessment:   Assessment: Tolerated treatment well. Patient exhibited good technique with therapeutic exercises and would benefit from continued OT.       Plan:   Focus on  ROM and strengthening to improve ability to complete daily activites with ease.  POC 2/27/24 - 5/7/24

## 2024-04-12 ENCOUNTER — OFFICE VISIT (OUTPATIENT)
Dept: OCCUPATIONAL THERAPY | Facility: CLINIC | Age: 74
End: 2024-04-12
Payer: MEDICARE

## 2024-04-12 DIAGNOSIS — R20.2 NUMBNESS AND TINGLING IN RIGHT HAND: Primary | ICD-10-CM

## 2024-04-12 DIAGNOSIS — M25.641 JOINT STIFFNESS OF HAND, RIGHT: ICD-10-CM

## 2024-04-12 DIAGNOSIS — R20.0 NUMBNESS AND TINGLING IN RIGHT HAND: Primary | ICD-10-CM

## 2024-04-12 DIAGNOSIS — M25.631 STIFFNESS OF RIGHT WRIST JOINT: ICD-10-CM

## 2024-04-12 DIAGNOSIS — M79.89 SWELLING OF RIGHT HAND: ICD-10-CM

## 2024-04-12 PROCEDURE — 97530 THERAPEUTIC ACTIVITIES: CPT

## 2024-04-12 PROCEDURE — 97110 THERAPEUTIC EXERCISES: CPT

## 2024-04-12 NOTE — PROGRESS NOTES
Daily Note    Today's date: 2024  Patient name: Dominique Varela  : 1950  MRN: [id number]  Referring provider: Karolyn Valdovinos PA-C  Dx:   Encounter Diagnosis     ICD-10-CM    1. Numbness and tingling in right hand  R20.0     R20.2       2. Joint stiffness of hand, right  M25.641       3. Swelling of right hand  M79.89       4. Stiffness of right wrist joint  M25.631                  Subjective: Patient offers no functional changes this date.       Objective: See treatment diary below      Auth Tracker- NO AUTH REQ BOMN  Auth Status Total   Visits  Expiration date Co-Insurance   pending                                  Visit Tracker  Date 2/27 2/28 3/5 3/7 3/12 3/14    3/18 3/21 3/25 3/28  RE                                                                PMHx:   has a past medical history of Breast cyst, GERD (gastroesophageal reflux disease), and Macular degeneration.    Precautions: [place]    Manuals HEP 2024                        Ther Ex     Education on HEP and dx  x5min   AROM digit add/abduction x    Wrist PRE's x X15 2# 3 ways   Therapist assisted PROM wrist flex/ext & digit IP flexion/ext x x5min   Wrist extension stretch w/ powerweb  Red x10   PowerWeb digit flexion and rotate  Green 2x10   Supination/pronation hammer  X10 2# wts.   AAROM wrist flex/ext stretch w/ ball  X15              Ther Act      Towel scrunch   X10; full fist hold at end   Juxtaciser  x5   Digi flex  Green x10    Flex bar N's/U's  X15 red   Sponges grasp and release   Lvl 2 gripper   Putty  squeezes  X3min yellow   Dice remove with CP  Whole jar with reverse Red clothespin   Weight bearing w/ ball  X10 prolonged hold   Modalities     MHP  x5 min             Assessment: Tolerated treatment well. Patient exhibited good technique with therapeutic exercises and would benefit from continued OT.       Plan:   Focus on ROM and strengthening to improve ability to complete daily activites with  ease.  POC 2/27/24 - 5/7/24

## 2024-04-15 ENCOUNTER — OFFICE VISIT (OUTPATIENT)
Dept: OCCUPATIONAL THERAPY | Facility: CLINIC | Age: 74
End: 2024-04-15
Payer: MEDICARE

## 2024-04-15 DIAGNOSIS — R20.0 NUMBNESS AND TINGLING IN RIGHT HAND: Primary | ICD-10-CM

## 2024-04-15 DIAGNOSIS — R20.2 NUMBNESS AND TINGLING IN RIGHT HAND: Primary | ICD-10-CM

## 2024-04-15 DIAGNOSIS — M25.641 JOINT STIFFNESS OF HAND, RIGHT: ICD-10-CM

## 2024-04-15 DIAGNOSIS — M25.631 STIFFNESS OF RIGHT WRIST JOINT: ICD-10-CM

## 2024-04-15 DIAGNOSIS — M79.89 SWELLING OF RIGHT HAND: ICD-10-CM

## 2024-04-15 PROCEDURE — 97110 THERAPEUTIC EXERCISES: CPT

## 2024-04-15 PROCEDURE — 97530 THERAPEUTIC ACTIVITIES: CPT

## 2024-04-15 NOTE — PROGRESS NOTES
Daily Note    Today's date: 4/15/2024  Patient name: Dominique Varela  : 1950  MRN: [id number]  Referring provider: Karolyn Valdovinos PA-C  Dx:   Encounter Diagnosis     ICD-10-CM    1. Numbness and tingling in right hand  R20.0     R20.2       2. Swelling of right hand  M79.89       3. Joint stiffness of hand, right  M25.641       4. Stiffness of right wrist joint  M25.631                  Subjective: Patient offers no functional changes this date.       Objective: See treatment diary below      Auth Tracker- NO AUTH REQ BOMN  Auth Status Total   Visits  Expiration date Co-Insurance   pending                                  Visit Tracker  Date 2/27 2/28 3/5 3/7 3/12 3/14    3/18 3/21 3/25 3/28  RE 4/1 4/2    4/5 4/9 4/11 4/12 4/15                                                              PMHx:   has a past medical history of Breast cyst, GERD (gastroesophageal reflux disease), and Macular degeneration.    Precautions: [place]    Manuals HEP 4/15/2024                        Ther Ex     Education on HEP and dx  x5min   AROM digit add/abduction x    Wrist PRE's x X15 2# 3 ways   Therapist assisted PROM wrist flex/ext & digit IP flexion/ext x x5min   Wrist extension stretch w/ powerweb  Red x10   PowerWeb digit flexion and rotate  Green 2x10   Supination/pronation hammer  X10 2# wts.   AAROM wrist flex/ext stretch w/ ball  X15              Ther Act      Towel scrunch   X10; full fist hold at end   Juxtaciser  x5   Digi flex  Green x10    Flex bar N's/U's  X15 red   Sponges grasp and release   Lvl 2 gripper   Putty  squeezes  X3min yellow   Dice remove with CP  Whole jar with reverse Red clothespin   Weight bearing w/ ball  X10 prolonged hold   Modalities     MHP  x5 min             Assessment: Tolerated treatment well. Patient exhibited good technique with therapeutic exercises and would benefit from continued OT.       Plan:   Focus on ROM and strengthening to improve ability to complete daily activites  with ease.  POC 2/27/24 - 5/7/24

## 2024-04-16 ENCOUNTER — OFFICE VISIT (OUTPATIENT)
Dept: OCCUPATIONAL THERAPY | Facility: CLINIC | Age: 74
End: 2024-04-16
Payer: MEDICARE

## 2024-04-16 ENCOUNTER — OFFICE VISIT (OUTPATIENT)
Dept: PHYSICAL THERAPY | Facility: CLINIC | Age: 74
End: 2024-04-16
Payer: MEDICARE

## 2024-04-16 DIAGNOSIS — M79.89 SWELLING OF RIGHT HAND: ICD-10-CM

## 2024-04-16 DIAGNOSIS — R20.0 NUMBNESS AND TINGLING IN RIGHT HAND: Primary | ICD-10-CM

## 2024-04-16 DIAGNOSIS — M25.641 JOINT STIFFNESS OF HAND, RIGHT: ICD-10-CM

## 2024-04-16 DIAGNOSIS — M75.121 NONTRAUMATIC COMPLETE TEAR OF RIGHT ROTATOR CUFF: Primary | ICD-10-CM

## 2024-04-16 DIAGNOSIS — M25.631 STIFFNESS OF RIGHT WRIST JOINT: ICD-10-CM

## 2024-04-16 DIAGNOSIS — Z98.890 S/P RIGHT ROTATOR CUFF REPAIR: ICD-10-CM

## 2024-04-16 DIAGNOSIS — R20.2 NUMBNESS AND TINGLING IN RIGHT HAND: Primary | ICD-10-CM

## 2024-04-16 PROCEDURE — 97110 THERAPEUTIC EXERCISES: CPT

## 2024-04-16 PROCEDURE — 97530 THERAPEUTIC ACTIVITIES: CPT

## 2024-04-16 NOTE — PROGRESS NOTES
Daily Note    Today's date: 2024  Patient name: Dominique Varela  : 1950  MRN: [id number]  Referring provider: Karolyn Valdovinos PA-C  Dx:   Encounter Diagnosis     ICD-10-CM    1. Numbness and tingling in right hand  R20.0     R20.2       2. Swelling of right hand  M79.89       3. Joint stiffness of hand, right  M25.641       4. Stiffness of right wrist joint  M25.631                  Subjective: Patient offers no functional changes this date.       Objective: See treatment diary below      Auth Tracker- NO AUTH REQ BOMN  Auth Status Total   Visits  Expiration date Co-Insurance   pending                                  Visit Tracker  Date 2/27 2/28 3/5 3/7 3/12 3/14    3/18 3/21 3/25 3/28  RE 4/1 4/2    4/5 4/9 4/11 4/12 4/15 4/16                                                             PMHx:   has a past medical history of Breast cyst, GERD (gastroesophageal reflux disease), and Macular degeneration.    Precautions: [place]    Manuals HEP 2024                        Ther Ex     Education on HEP and dx  x5min   AROM digit add/abduction x    Wrist PRE's x X15 2# 3 ways   Therapist assisted PROM wrist flex/ext & digit IP flexion/ext x x5min   Wrist extension stretch w/ powerweb  Red x10   PowerWeb digit flexion and rotate  Green 2x10   Supination/pronation hammer  X10 2# wts.   AAROM wrist flex/ext stretch w/ ball  X15              Ther Act      Towel scrunch   X10; full fist hold at end   Juxtaciser  x5   Digi flex  Green x10    Flex bar N's/U's  X15 red   Sponges grasp and release   Lvl 2 gripper   Putty  squeezes  X3min yellow   Dice remove with CP  Whole jar with reverse Red clothespin   Custom night time extension splint  X10 min                        Modalities     MHP  x5 min             Assessment: Tolerated treatment well. Patient exhibited good technique with therapeutic exercises/ activities. Patient would benefit from continued OT. Patient fit with custom night time extension splint  for exhibited numbness and tingling. Written approval from physician given.       Plan:   Focus on ROM and strengthening to improve ability to complete daily activites with ease.  POC 2/27/24 - 5/7/24

## 2024-04-18 ENCOUNTER — OFFICE VISIT (OUTPATIENT)
Dept: OCCUPATIONAL THERAPY | Facility: CLINIC | Age: 74
End: 2024-04-18
Payer: MEDICARE

## 2024-04-18 ENCOUNTER — EVALUATION (OUTPATIENT)
Dept: PHYSICAL THERAPY | Facility: CLINIC | Age: 74
End: 2024-04-18
Payer: MEDICARE

## 2024-04-18 DIAGNOSIS — M25.641 JOINT STIFFNESS OF HAND, RIGHT: ICD-10-CM

## 2024-04-18 DIAGNOSIS — Z98.890 S/P RIGHT ROTATOR CUFF REPAIR: ICD-10-CM

## 2024-04-18 DIAGNOSIS — M75.121 NONTRAUMATIC COMPLETE TEAR OF RIGHT ROTATOR CUFF: Primary | ICD-10-CM

## 2024-04-18 DIAGNOSIS — M79.89 SWELLING OF RIGHT HAND: ICD-10-CM

## 2024-04-18 DIAGNOSIS — R20.2 NUMBNESS AND TINGLING IN RIGHT HAND: Primary | ICD-10-CM

## 2024-04-18 DIAGNOSIS — M25.631 STIFFNESS OF RIGHT WRIST JOINT: ICD-10-CM

## 2024-04-18 DIAGNOSIS — R20.0 NUMBNESS AND TINGLING IN RIGHT HAND: Primary | ICD-10-CM

## 2024-04-18 PROCEDURE — 97112 NEUROMUSCULAR REEDUCATION: CPT

## 2024-04-18 PROCEDURE — 97530 THERAPEUTIC ACTIVITIES: CPT

## 2024-04-18 PROCEDURE — 97110 THERAPEUTIC EXERCISES: CPT

## 2024-04-18 NOTE — PROGRESS NOTES
PT Re-Evaluation     Today's date: 2024  Patient name: Dominique Varela  : 1950  MRN: [id number]  Referring provider: Karolyn Valdovinos PA-C  Dx:   Encounter Diagnosis     ICD-10-CM    1. Nontraumatic complete tear of right rotator cuff  M75.121       2. S/P right rotator cuff repair  Z98.890                      Assessment  Assessment details: Dominique Varela has been seen in hospital-based outpatient PT for 27 visits s/p right rotator cuff repair with biceps tenotomy, which occurred on 24. Patient demonstrates good progress in short-term and long-term physical therapy goals. Patient presents with the following improvements noted: decreased right shoulder pain, improved right shoulder ROM, and improved right shoulder strength. Patient continues to present with the following impairments: right shoulder pain, limited right shoulder P/AAROM, limited right UE strength, and postural dysfunction. Due to these impairments, patient continues to have difficulty performing the following: ADL's, lifting/carrying, reaching overhead, self-care activities, bed mobility, sidelying right, putting on/taking off shoes/socks, household chores, shopping, and sleeping. Patient has been educated in updated plan of care. Patient would benefit from continued skilled physical therapy services to address the above functional limitations and progress towards prior level of function and independence with home exercise program.  Impairments: abnormal muscle firing, abnormal or restricted ROM, activity intolerance, impaired physical strength, lacks appropriate home exercise program, pain with function, scapular dyskinesis, weight-bearing intolerance, poor posture  and poor body mechanics  Understanding of Dx/Px/POC: good   Prognosis: good    Goals  Short Term Goals [3 weeks; target date: 2/15/24]  1. Patient will be independent with initial HEP. - goal met  2. Patient will demonstrate an increase in right shoulder flexion PROM to  130°. - goal met    Long Term Goals [6 weeks; target date: 4/15/24]  1. Patient will be independent with comprehensive HEP.  - in progress  2. Patient will demonstrate an increase in right shoulder AROM to WNL in order to promote bathing/dressing without pain. - in progress  3. Patient will demonstrate an increase in right shoulder strength to at least 4/5 on MMT in order to promote pain-free carrying/lifting. - in progress  4. Patient will be able to place a 2 lb. weight on a shelf above shoulder height with proper scapulohumeral mechanics. - in progress  5. Patient will be able to perform ADLs with right shoulder pain of 2/10 at worst. - in progress    Plan  Patient would benefit from: skilled physical therapy  Planned modality interventions: cryotherapy, electrical stimulation/Russian stimulation, TENS, ultrasound, high voltage pulsed current: pain management, thermotherapy: hydrocollator packs, unattended electrical stimulation and high voltage pulsed current: spasm management  Planned therapy interventions: flexibility, functional ROM exercises, graded exercise, home exercise program, joint mobilization, manual therapy, neuromuscular re-education, patient education, strengthening, stretching, therapeutic exercise, therapeutic activities, activity modification, postural training, body mechanics training, graded activity, self care, muscle pump exercises, abdominal trunk stabilization, ADL retraining, ADL training, IADL retraining, breathing training, behavior modification, therapeutic training, transfer training and Hernandez taping  Frequency: 2x week  Duration in weeks: 4  Plan of Care beginning date: 4/18/2024  Plan of Care expiration date: 5/31/2024  Treatment plan discussed with: patient    Subjective Evaluation    History of Present Illness  Date of surgery: 1/4/2024  Mechanism of injury: (Information regarding KIMBERLY was taken from evaluation at this facility on 10/16/23.)     Pt reports R shoulder pain over  the past 3 weeks, which she attributes to moving and pushing boxes around her home as she is trying to get rid of things around her home. Pt states that while pushing with her arm to get up from her sectional couch, she has pain in the shoulder. Pt states that reaching back will sometimes cause popping and mild pain. Pt denies difficulty with getting dressed, bathing, or getting ready. Pt states she had an x-ray taken when she saw orthopedics, which was normal. Pt was referred to PT.     (1/15/24) Pt attended PT at this facility from October 2023 - November 2023 and stopped to pursue further testing with orthopedics. An MRI in December 2023 revealed supraspinatus tear, low-grade infraspinatus and subscapularis tear, and medially subluxed biceps long head tendon as per MRI report. Pt elected to undergo RTC repair surgery on 1/11/24 and is doing well overall. Pt states that she has been able to shower with the help of her daughter. Pt states that resting her arm at her side while showering makes the shoulder feel achy, but otherwise pain has been improving overall.     (2/15/24) Pt reports good progress since starting PT. Pt notes improvements in pain, ROM, and use of the right arm. Pt states she is better able to button her shirt, which she was not able to do a month ago. Pt states that she is still limited in reaching up, reaching behind the back, and in the strength of the right arm.      (3/14/24) Pt reports continued progress during PT. Pt states that pain and ROM have improved. Pt states she is able to reach back further, but still has trouble reaching to the center of her back. Pt states reaching to the side is most limited and is improving with reaching forward and up. Pt states she still needs to gain strength.     (4/18/24) Pt reports 75% improvement in the right shoulder since the start of PT. Pt states that pain has decreased and she has improved with reaching forward and to the side. Pt states she still  has difficulty reaching behind her back. Pt states that she feels soreness with exercises, but not as much pain.   Patient Goals  Patient goal: To get back to normal  Pain  Current pain ratin  At best pain ratin  At worst pain ratin  Location: R shoulder  Quality: dull ache  Relieving factors: ice and medications  Exacerbated by: Resting with arm down.  Progression: improved    Social Support  Lives with: Son; daughter is available to come 2x/week to help with bathing.    Employment status: not working  Hand dominance: left  Exercise history: Walking 2x/week      Diagnostic Tests  MRI studies: abnormal  Treatments  Previous treatment: physical therapy      Objective     Postural Observations    Additional Postural Observation Details  Slight forward head, rounded shoulder posture     Active Range of Motion   Left Shoulder   Flexion: 160 degrees   Abduction: 150 degrees   External rotation 0°: 88 degrees   Internal rotation 0°: WFL  Internal rotation BTB: T8     Right Shoulder   Flexion: 160 degrees   Abduction: 123 degrees   External rotation 0°: 68 degrees with pain  Internal rotation 0°: WFL  Internal rotation BTB: L1 with pain    Additional Active Range of Motion Details  (+) shrug during abduction ROM    Passive Range of Motion     Right Shoulder   Flexion: 162 degrees with pain  Abduction: 130 degrees with pain  External rotation 0°: 70 degrees with pain  Internal rotation 0°: WFL    Strength/Myotome Testing     Left Shoulder     Planes of Motion   Flexion: 5   Abduction: 5   External rotation at 0°: 5   Internal rotation at 0°: 5     Right Shoulder     Planes of Motion   Flexion: 3+   Abduction: 3   External rotation at 0°: 3   Internal rotation at 0°: 4              Insurance:  AMA/CMS Eval/ Re-eval POC expires FOTO Auth Status Co-Insurance   CMS - MCR 1/15/24 4/15/24   None req No     2/15/24 4/15/24           3/14/24 4/30/24          4/18/24 5/31/24         1.  1/15 2.    3.    4.     5.   1/30 6.   2/1   7.   2/5 8.   2/8 9.   2/14 10.   2/15 RE 11.   2/20 12.   2/22   13.   2/27 14.   2/28 15.   3/5 16.   3/7 17.   3/12 18.   3/14 RE   19.   3/19 20.  3/21 21.  3/26 22.  3/28 23.  4/2 24.   4/9   25.   4/11 26.  4/16/2024 27.  4/18 RE 28. 29. 30.   31. 32.  33. 34. 35. 36.         Precautions: s/p R rotator cuff repair w/ biceps tenotomy 1/4/24       23 24 25  26 27 (RE-EVAL)    Manuals 4/2/24 4/9/24 4/11/24 4/16/2024 4/18/24    STM/MFR              Joint mobs             Neuromobility             Neuro Re-Ed             Cervical retraction 2x10 seated           Rows   2x10 RTB 2x10 GTB  2x10 GTB 2x10 blue TB    Shoulder extensions   2x10 RTB 2x10 GTB  2x10 GTB 2x10 GTB    Shoulder isos 20x with ball  Flex, ext, ER, IR, abd             Scaption                                         Ther Ex             PROM shoulder Flex, ER, abd Flex, ER, abd     Flex, ER, abd    Pendulums             Cane flexion             Cane ER             Flexion AAROM w/ hands clasped 20x 5s standing w/ eccentric controlled lower     20x5s standing w/ ecc lowering 20x5s standing w/ ecc lowering  20x5s standing w/ ecc lowering     Cane abd 10x 5s with cane     2x10 AROM 10x5 with cane     2x10 AROM 15x5s with cane     2x10 AROM  15x5s with cane     2x10 AROM 2x10 AROM    Table slides             Finger ladder             Pulleys 20x 5s flex, abd 20x5s flex, abd 20x5s flex, abd  20x 5s flex abd 20x5s flex, abd    Supine shld flex AROM             Stand flexion AROM         2x10     Sidelying ER   2x10 sidelying    2x10 sidelying     Tband IR   2x10 GTB 2x10 GTB  2x10 GTB      Tband ER   2x10 YTB 2x10 YTB  2x10 YTB     Behind the back stretch   5x15s  5x15s   5x 15s 5x15s    Lateral raises         Ther Activity             Pt education                                         Modalities             CP R shoulder                              Access Code: [email or web address]  URL: [email or web address]  Date:  02/01/2024  Prepared by: Irvin Abdul     Exercises  - Seated Scapular Retraction  - 2-3 x daily - 7 x weekly - 2 sets - 10 reps - 5 hold  - Seated Shoulder Shrugs  - 2-3 x daily - 7 x weekly - 2 sets - 10 reps - 5 hold  - Circular Shoulder Pendulum with Table Support  - 2-3 x daily - 7 x weekly - 1 sets - 30 reps  - Standing Elbow Flexion Extension AROM  - 2-3 x daily - 7 x weekly - 2 sets - 10 reps - 5 hold  - Supine Shoulder Flexion with Dowel  - 1 x daily - 7 x weekly - 1 sets - 10 reps - 5 hold  - Supine Shoulder External Rotation with Dowel  - 1 x daily - 7 x weekly - 1 sets - 10 reps - 5 hold         Physical Therapy Protocol: Rotator Cuff Repair Immediate Therapy    Based on [facility] Shoulder Protocol    Modalities: performed at the physical therapist's discretion within the guidelines of this protocol.    Wound Care: Keep the dressing clean and dry.  Light drainage may occur the first 2 days postop.  You may remove the dressings and get the incision wet in the shower 72 hours after surgery.  DO NOT remove steri-strips or sutures.  DO NOT immerse the incision under water.  Carefully pat the incision dry.  If there is wound drainage, re-apply a fresh dry gauze dressing.    Sling: wear the sling at all times, including sleep, except for hygiene for 6 weeks following surgery. Keep the arm by the side during hygiene. The patient may remove the sling, keeping the arm by the side to perform gentle wrist and elbow range of motion. The sling may be removed for PT sessions as described below.    PHASE I (Weeks 0-4): passive range of motion       · Begin passive range of motion within 7 days following surgery. Completed 3 times per week       · Begin scapula exercises with the arm in the sling within 7 days following surgery, including scapular elevation, depression, retraction, and protraction. Completed daily Weeks 0-6       · Begin pendulum exercises at home. Completed 2 times per day    PHASE II (Weeks 4-8): active  assisted range of motion       · Week 4: lying active assisted motion            o Using a stick or cane while lying flat, the nonsurgical arm moves the injured arm through different motions, including forward flexion, external rotation, and abduction. Completed daily       · Weeks 5: 45-degree active assisted motion            o Using a stick or cane while lying propped at 45 degrees, the nonsurgical arm moves the injured arm through different motions, including forward flexion, external rotation, and abduction. Completed daily       · Weeks 6-8: upright active assisted motion            o Using a stick or cane while sitting up or standing, the nonsurgical arm moves the injured arm through different motions, including forward flexion, external rotation, and abduction. Completed daily       · Week 6: scapula exercises without the sling, including scapular elevation, depression, retraction, and protraction. Completed daily    PHASE III (Weeks 8-12): active motion       · AROM: while sitting up or standing, actively forward flex and abduct the shoulders. Completed daily            o Important not to hike the shoulder up towards the ear.       · Isometric exercises: push and pull a folded towel or pillow into a wall. Completed daily, holding push/pull for 15s with 30s rest in between for 10-15 reps            o Completed with elbow flexed at 90 degrees and with shoulder internal/external rotation    PHASE IV (Weeks 12-16): resisted exercise       · Begin rotator cuff strengthening using weights and/or elastic bands. Completed 3 times per week for 3-4 sets of 10-15 reps            o Resisted shoulder internal/external rotation            o Resisted deltoid strengthening            o Resisted scapula strengthening            o AVOID doing full can or empty can exercises       · Begin light shoulder stretching            o Hold each stretch for 15s, then rest 15s. Repeat 5 times            o After Week 16, may use  aggressive stretching if needed

## 2024-04-18 NOTE — PROGRESS NOTES
Daily Note    Today's date: 2024  Patient name: Dominique Varela  : 1950  MRN: [id number]  Referring provider: Karolyn Valdovinos PA-C  Dx:   Encounter Diagnosis     ICD-10-CM    1. Numbness and tingling in right hand  R20.0     R20.2       2. Swelling of right hand  M79.89       3. Stiffness of right wrist joint  M25.631       4. Joint stiffness of hand, right  M25.641                    Subjective: Patient offers no functional changes this date.       Objective: See treatment diary below      Auth Tracker- NO AUTH REQ BOMN  Auth Status Total   Visits  Expiration date Co-Insurance   pending                                  Visit Tracker  Date 2/27 2/28 3/5 3/7 3/12 3/14    3/18 3/21 3/25 3/28  RE 4/1 4/2    4/5 4/9 4/11 4/12 4/15 4/16    4/18                                                         PMHx:   has a past medical history of Breast cyst, GERD (gastroesophageal reflux disease), and Macular degeneration.    Precautions: [place]    Manuals HEP 2024                        Ther Ex     Education on HEP and dx  x5min   AROM digit add/abduction x    Wrist PRE's x X15 2# 3 ways   Therapist assisted PROM wrist flex/ext & digit IP flexion/ext x x5min   Wrist extension stretch w/ powerweb  Red x10   PowerWeb digit flexion and rotate  Green 2x10   Supination/pronation hammer  X10 2# wts.   AAROM wrist flex/ext stretch w/ ball  X15              Ther Act      Towel scrunch   X10; full fist hold at end   Digi flex  Green x10    Flex bar N's/U's  X15 red   Sponges grasp and release   Lvl 2 gripper   Putty  squeezes  X3min yellow   Dice remove with CP  Whole jar with reverse Red clothespin   Custom night time extension splint                         Modalities     MHP  x5 min             Assessment: Tolerated treatment well. Patient exhibited good technique with therapeutic exercises/ activities. Patient would benefit from continued OT. Patient fit with custom night time extension splint for exhibited  numbness and tingling. Written approval from physician given.         Plan:   Focus on ROM and strengthening to improve ability to complete daily activites with ease.  POC 2/27/24 - 5/7/24

## 2024-04-19 ENCOUNTER — OFFICE VISIT (OUTPATIENT)
Dept: OCCUPATIONAL THERAPY | Facility: CLINIC | Age: 74
End: 2024-04-19
Payer: MEDICARE

## 2024-04-19 DIAGNOSIS — M25.631 STIFFNESS OF RIGHT WRIST JOINT: ICD-10-CM

## 2024-04-19 DIAGNOSIS — R20.0 NUMBNESS AND TINGLING IN RIGHT HAND: Primary | ICD-10-CM

## 2024-04-19 DIAGNOSIS — M79.89 SWELLING OF RIGHT HAND: ICD-10-CM

## 2024-04-19 DIAGNOSIS — R20.2 NUMBNESS AND TINGLING IN RIGHT HAND: Primary | ICD-10-CM

## 2024-04-19 DIAGNOSIS — M25.641 JOINT STIFFNESS OF HAND, RIGHT: ICD-10-CM

## 2024-04-19 PROCEDURE — 97110 THERAPEUTIC EXERCISES: CPT

## 2024-04-19 PROCEDURE — 97530 THERAPEUTIC ACTIVITIES: CPT

## 2024-04-19 NOTE — PROGRESS NOTES
Daily Note    Today's date: 2024  Patient name: Dominique Varela  : 1950  MRN: [id number]  Referring provider: Karolyn Valdovinos PA-C  Dx:   Encounter Diagnosis     ICD-10-CM    1. Numbness and tingling in right hand  R20.0     R20.2       2. Swelling of right hand  M79.89       3. Stiffness of right wrist joint  M25.631       4. Joint stiffness of hand, right  M25.641                  Subjective: Patient offers no functional changes this date.         Objective: See treatment diary below      Auth Tracker- NO AUTH REQ BOMN  Auth Status Total   Visits  Expiration date Co-Insurance   pending                                  Visit Tracker  Date 2/27 2/28 3/5 3/7 3/12 3/14    3/18 3/21 3/25 3/28  RE 4/1 4/2    4/5 4/9 4/11 4/12 4/15 4/16    4/18 4/19                                                        PMHx:   has a past medical history of Breast cyst, GERD (gastroesophageal reflux disease), and Macular degeneration.    Precautions: [place]    Manuals HEP 2024                        Ther Ex     Education on HEP and dx  x5min   AROM digit add/abduction x    Wrist PRE's x X15 2# 3 ways   Therapist assisted PROM wrist flex/ext & digit IP flexion/ext x x5min   Wrist extension stretch w/ powerweb  Red x10   PowerWeb digit flexion and rotate  Green 2x10   Supination/pronation hammer  X10 2# wts.   AAROM wrist flex/ext stretch w/ ball  X15    Varigrip Bert  Flexion x20, extension x20 50% resistance             Ther Act      Towel scrunch   X10; full fist hold at end   Digi flex  Green x10    Flex bar N's/U's  X15 red   Sponges grasp and release   Lvl 2 gripper   Putty  squeezes  X3min yellow   Dice remove with CP  Whole jar with reverse Red clothespin   Custom night time extension splint                         Modalities     MHP  x5 min             Assessment: Tolerated treatment well with minimal complaints of muscle fatigue requiring rest breaks through out. Patient exhibited good technique with  therapeutic exercises/ activities. Patient would benefit from continued OT. Patient fit with custom night time extension splint for exhibited numbness and tingling. Written approval from physician given.         Plan:   Focus on ROM and strengthening to improve ability to complete daily activites with ease.  POC 2/27/24 - 5/7/24

## 2024-04-22 ENCOUNTER — OFFICE VISIT (OUTPATIENT)
Dept: OCCUPATIONAL THERAPY | Facility: CLINIC | Age: 74
End: 2024-04-22
Payer: MEDICARE

## 2024-04-22 ENCOUNTER — OFFICE VISIT (OUTPATIENT)
Dept: PHYSICAL THERAPY | Facility: CLINIC | Age: 74
End: 2024-04-22
Payer: MEDICARE

## 2024-04-22 DIAGNOSIS — M25.631 STIFFNESS OF RIGHT WRIST JOINT: ICD-10-CM

## 2024-04-22 DIAGNOSIS — R20.2 NUMBNESS AND TINGLING IN RIGHT HAND: Primary | ICD-10-CM

## 2024-04-22 DIAGNOSIS — M79.89 SWELLING OF RIGHT HAND: ICD-10-CM

## 2024-04-22 DIAGNOSIS — M25.641 JOINT STIFFNESS OF HAND, RIGHT: ICD-10-CM

## 2024-04-22 DIAGNOSIS — R20.0 NUMBNESS AND TINGLING IN RIGHT HAND: Primary | ICD-10-CM

## 2024-04-22 DIAGNOSIS — Z98.890 S/P RIGHT ROTATOR CUFF REPAIR: Primary | ICD-10-CM

## 2024-04-22 PROCEDURE — 97110 THERAPEUTIC EXERCISES: CPT

## 2024-04-22 PROCEDURE — 97530 THERAPEUTIC ACTIVITIES: CPT

## 2024-04-22 PROCEDURE — 97112 NEUROMUSCULAR REEDUCATION: CPT

## 2024-04-22 NOTE — PROGRESS NOTES
Daily Note     Today's date: 2024  Patient name: Dominique Varela  : 1950  MRN: [id number]  Referring provider: Karolyn Valdovinos PA-C  Dx:   Encounter Diagnosis     ICD-10-CM    1. S/P right rotator cuff repair  Z98.890                      Subjective: Patient reports her shoulder is feeling the same since last session.       Objective: See treatment diary below; Patient was co-treated with JOHN Dozier, under my direct supervision.        Assessment: Tolerated treatment well. Patient  had increased motion with active range of motion in shoulder flexion and abduction. The patient was able to increase resistance in shoulder ER and IR  and was able to add resistance to shoulder abduction and ER in side lying. During next session, plan to continue to progress shoulder active range of motion and increase shoulder strengthening activities.       Plan: Continue per plan of care.      Insurance:  AMA/CMS Eval/ Re-eval POC expires FOTO Auth Status Co-Insurance   CMS - MCR 1/15/24 4/15/24   None req No     2/15/24 4/15/24           3/14/24 4/30/24          4/18/24 5/31/24         1.  1/15 2.   1/ 3.   1 4.    5.    6.   2/   7.   2/5 8.   2/8 9.   2/14 10.   2/15 RE 11.   2/ 12.   2   13.   2/ 14.   2/ 15.   3/5 16.   3/7 17.   3/12 18.   3/14 RE   19.   3/19 20.  3/21 21.  3/26 22.  3/28 23.  4/2 24.   4/9   25.   4/11 26.  2024 27.  4/18 RE 28.   29. 30.   31. 32.  33. 34. 35. 36.         Precautions: s/p R rotator cuff repair w/ biceps tenotomy 24       23 24 25  26 27 (RE-EVAL) 28   Manuals 24     STM/MFR              Joint mobs             Neuromobility             Neuro Re-Ed             Cervical retraction 2x10 seated           Rows   2x10 RTB 2x10 GTB  2x10 GTB 2x10 blue TB 2x15 blue tb    Shoulder extensions   2x10 RTB 2x10 GTB  2x10 GTB 2x10 GTB 2x15 blue tb   Shoulder isos 20x with ball  Flex, ext, ER, IR, abd              Scaption                                         Ther Ex             PROM shoulder Flex, ER, abd Flex, ER, abd     Flex, ER, abd    Pendulums             Cane flexion             Cane ER             Flexion AAROM w/ hands clasped 20x 5s standing w/ eccentric controlled lower     20x5s standing w/ ecc lowering 20x5s standing w/ ecc lowering  20x5s standing w/ ecc lowering  20x5s with ecc lowering   Cane abd 10x 5s with cane     2x10 AROM 10x5 with cane     2x10 AROM 15x5s with cane     2x10 AROM  15x5s with cane     2x10 AROM 2x10 AROM 2x10 AROM   Table slides             Finger ladder             Pulleys 20x 5s flex, abd 20x5s flex, abd 20x5s flex, abd  20x 5s flex abd 20x5s flex, abd 20x5s flex abd   Supine shld flex AROM             Stand flexion AROM         2x10  2x10   Standing abd       2x10    Sidelying ER   2x10 sidelying    2x10 sidelying  2x10 1lb   Tband IR   2x10 GTB 2x10 GTB  2x10 GTB   Blue tb 2x10    Tband ER   2x10 YTB 2x10 YTB  2x10 YTB  RTB 2x10   Behind the back stretch   5x15s  5x15s   5x 15s 5x15s 5x15s   Lateral raises      1lb 2x10    Ther Activity             Pt education                                         Modalities             CP R shoulder                              Access Code: [email or web address]  URL: [email or web address]  Date: 02/01/2024  Prepared by: Irvin Abdul     Exercises  - Seated Scapular Retraction  - 2-3 x daily - 7 x weekly - 2 sets - 10 reps - 5 hold  - Seated Shoulder Shrugs  - 2-3 x daily - 7 x weekly - 2 sets - 10 reps - 5 hold  - Circular Shoulder Pendulum with Table Support  - 2-3 x daily - 7 x weekly - 1 sets - 30 reps  - Standing Elbow Flexion Extension AROM  - 2-3 x daily - 7 x weekly - 2 sets - 10 reps - 5 hold  - Supine Shoulder Flexion with Dowel  - 1 x daily - 7 x weekly - 1 sets - 10 reps - 5 hold  - Supine Shoulder External Rotation with Dowel  - 1 x daily - 7 x weekly - 1 sets - 10 reps - 5 hold

## 2024-04-22 NOTE — PROGRESS NOTES
Daily Note    Today's date: 2024  Patient name: Dominique Varela  : 1950  MRN: [id number]  Referring provider: Karolyn Valdovinos PA-C  Dx:   Encounter Diagnosis     ICD-10-CM    1. Numbness and tingling in right hand  R20.0     R20.2       2. Swelling of right hand  M79.89       3. Stiffness of right wrist joint  M25.631       4. Joint stiffness of hand, right  M25.641                  Subjective: Patient offers no functional changes this date.         Objective: See treatment diary below      Auth Tracker- NO AUTH REQ BOMN  Auth Status Total   Visits  Expiration date Co-Insurance   pending                                  Visit Tracker  Date 2/27 2/28 3/5 3/7 3/12 3/14    3/18 3/21 3/25 3/28   4/1 4/2  RE    4/5 4/9 4/11 4/12 4/15 4/16    4/18 4/19 4/22                                                       PMHx:   has a past medical history of Breast cyst, GERD (gastroesophageal reflux disease), and Macular degeneration.    Precautions: [place]    Manuals HEP 2024                        Ther Ex     Education on HEP and dx  x5min   AROM digit add/abduction x    Wrist PRE's x X15 2# 3 ways   Therapist assisted PROM wrist flex/ext & digit IP flexion/ext x x5min   Wrist extension stretch w/ powerweb  Red x10   PowerWeb digit flexion and rotate  Green 2x10   Supination/pronation hammer  X10 2# wts.   AAROM wrist flex/ext stretch w/ ball  X15    Varigrip Bert  Flexion x20, extension x20 50% resistance             Ther Act      Towel scrunch   X10; full fist hold at end   Digi flex  Green x10    Flex bar N's/U's  X15 red   Sponges grasp and release   Lvl 2 gripper   Putty  squeezes  X3min yellow   Dice remove with CP  Whole jar with reverse Red clothespin   Custom night time extension splint                         Modalities     MHP  x5 min             Assessment: Tolerated treatment well with minimal complaints of muscle fatigue requiring rest breaks through out. Patient exhibited good technique with  therapeutic exercises/ activities. Patient would benefit from continued OT. Patient fit with custom night time extension splint for exhibited numbness and tingling. Written approval from physician given.         Plan:   Focus on ROM and strengthening to improve ability to complete daily activites with ease.  POC 2/27/24 - 5/7/24

## 2024-04-24 ENCOUNTER — OFFICE VISIT (OUTPATIENT)
Dept: PHYSICAL THERAPY | Facility: CLINIC | Age: 74
End: 2024-04-24
Payer: MEDICARE

## 2024-04-24 ENCOUNTER — OFFICE VISIT (OUTPATIENT)
Dept: OCCUPATIONAL THERAPY | Facility: CLINIC | Age: 74
End: 2024-04-24
Payer: MEDICARE

## 2024-04-24 DIAGNOSIS — M79.89 SWELLING OF RIGHT HAND: ICD-10-CM

## 2024-04-24 DIAGNOSIS — M25.641 JOINT STIFFNESS OF HAND, RIGHT: ICD-10-CM

## 2024-04-24 DIAGNOSIS — M25.631 STIFFNESS OF RIGHT WRIST JOINT: ICD-10-CM

## 2024-04-24 DIAGNOSIS — R20.0 NUMBNESS AND TINGLING IN RIGHT HAND: Primary | ICD-10-CM

## 2024-04-24 DIAGNOSIS — R20.2 NUMBNESS AND TINGLING IN RIGHT HAND: Primary | ICD-10-CM

## 2024-04-24 DIAGNOSIS — Z98.890 S/P RIGHT ROTATOR CUFF REPAIR: Primary | ICD-10-CM

## 2024-04-24 PROCEDURE — 97110 THERAPEUTIC EXERCISES: CPT

## 2024-04-24 PROCEDURE — 97112 NEUROMUSCULAR REEDUCATION: CPT

## 2024-04-24 PROCEDURE — 97530 THERAPEUTIC ACTIVITIES: CPT

## 2024-04-24 NOTE — PROGRESS NOTES
Daily Note     Today's date: 2024  Patient name: Dominique Varela  : 1950  MRN: [id number]  Referring provider: Karolyn Valdovinos PA-C  Dx:   Encounter Diagnosis     ICD-10-CM    1. S/P right rotator cuff repair  Z98.890                      Subjective: Patient reports her shoulder is feeling okay today. She reports no new changes in her shoulder symptoms.      Objective: See treatment diary below; Patient was co-treated with JOHN Dozier, under my direct supervision.        Assessment: Tolerated treatment well. Patient  demonstrated an increase in active shoulder flexion and the motion was smooth and involved no shoulder hiking compensation. Patient was able to complete abduction with a 1lb weight in side-lying with improved range of motion than when completed in standing.       Plan: Continue per plan of care.      Insurance:  AMA/CMS Eval/ Re-eval POC expires FOTO Auth Status Co-Insurance   CMS - [facility] 1/15/24 4/15/24   None req No     2/15/24 4/15/24           3/14/24 4/30/24          4/18/24 5/31/24         1.  1/15 2.   1/17 3.   1 4.    5.    6.   2/1   7.   2/5 8.   2/8 9.   2/14 10.   2/15 RE 11.   2/20 12.   2/   13.   2/ 14.   2/ 15.   3/5 16.   3/7 17.   3/12 18.   3/14 RE   19.   3/19 20.  3/21 21.  3/ 22.  3/28 23.  4/2 24.   4/9   25.   4/11 26.  2024 27.  4/18 RE 28.  4/ 29.  2024   30.   31. 32.  33. 34. 35. 36.         Precautions: s/p R rotator cuff repair w/ biceps tenotomy 24       25  26 27 (RE-EVAL) 28 29    Manuals 24      STM/MFR            Joint mobs           Neuromobility           Neuro Re-Ed           Cervical retraction           Rows 2x10 GTB  2x10 GTB 2x10 blue TB 2x15 blue tb  2x15 blue TB    Shoulder extensions 2x10 GTB  2x10 GTB 2x10 GTB 2x15 blue tb 2x15 blue TB    Shoulder isos            Scaption                                   Ther Ex           PROM shoulder     Flex, ER, abd       Pendulums           Cane flexion           Cane ER           Flexion AAROM w/ hands clasped 20x5s standing w/ ecc lowering  20x5s standing w/ ecc lowering  20x5s with ecc lowering 20x5s with ecc lowering     Cane abd 15x5s with cane     2x10 AROM  15x5s with cane     2x10 AROM 2x10 AROM 2x10 AROM 20x with cane     Table slides           Finger ladder           Pulleys 20x5s flex, abd  20x 5s flex abd 20x5s flex, abd 20x5s flex abd 20 x 5s flex, abd    Supine shld flex AROM           Stand flexion AROM     2x10  2x10 2x10    Standing abd     2x10  2x10    Sidelying ER    2x10 sidelying  2x10 1lb     Sidelying abd      1lb 1# 2x10    Tband IR 2x10 GTB  2x10 GTB   Blue tb 2x10  Blue TB 2x10    Tband ER 2x10 YTB  2x10 YTB  RTB 2x10 GTB 2x10    Behind the back stretch 5x15s   5x 15s 5x15s 5x15s 5x15s    Lateral raises    1lb 2x10      Ther Activity           Pt education                                   Modalities           CP R shoulder                          Access Code: [email or web address]  URL: [email or web address]  Date: 02/01/2024  Prepared by: Irvin Abdul     Exercises  - Seated Scapular Retraction  - 2-3 x daily - 7 x weekly - 2 sets - 10 reps - 5 hold  - Seated Shoulder Shrugs  - 2-3 x daily - 7 x weekly - 2 sets - 10 reps - 5 hold  - Circular Shoulder Pendulum with Table Support  - 2-3 x daily - 7 x weekly - 1 sets - 30 reps  - Standing Elbow Flexion Extension AROM  - 2-3 x daily - 7 x weekly - 2 sets - 10 reps - 5 hold  - Supine Shoulder Flexion with Dowel  - 1 x daily - 7 x weekly - 1 sets - 10 reps - 5 hold  - Supine Shoulder External Rotation with Dowel  - 1 x daily - 7 x weekly - 1 sets - 10 reps - 5 hold

## 2024-04-24 NOTE — PROGRESS NOTES
Daily Note    Today's date: 2024  Patient name: Dominique Varela  : 1950  MRN: [id number]  Referring provider: Karolyn Valdovinos PA-C  Dx:   Encounter Diagnosis     ICD-10-CM    1. Numbness and tingling in right hand  R20.0     R20.2       2. Swelling of right hand  M79.89       3. Stiffness of right wrist joint  M25.631       4. Joint stiffness of hand, right  M25.641                    Subjective: Patient offers no functional changes this date.         Objective: See treatment diary below      Auth Tracker- NO AUTH REQ BOMN  Auth Status Total   Visits  Expiration date Co-Insurance   pending                                  Visit Tracker  Date 2/27 2/28 3/5 3/7 3/12 3/14    3/18 3/21 3/25 3/28   4/1 4/2  RE    4/5 4/9 4/11 4/12 4/15 4/16    4/18 4/19 4/22 4/24                                                      PMHx:   has a past medical history of Breast cyst, GERD (gastroesophageal reflux disease), and Macular degeneration.    Precautions: [place]    Manuals HEP 2024                        Ther Ex     Education on HEP and dx  x5min   Wrist PRE's x X15 2# 3 ways   Therapist assisted PROM wrist flex/ext & digit IP flexion/ext x x5min   Wrist extension stretch w/ powerweb  Red x10   PowerWeb digit flexion and rotate  Green 2x10   Supination/pronation hammer  X10 2# wts.   Varigrip Bert  Flexion x20, extension x20 50% resistance             Ther Act      Towel scrunch   X10; full fist hold at end   Digi flex  Green x10    Flex bar N's/U's  X10 red   Sponges grasp and release   Lvl 3 gripper   Dice remove with CP  Whole jar with Green clothespin   Custom night time extension splint                         Modalities     MHP  x5 min             Assessment: Tolerated treatment well with minimal complaints of muscle fatigue requiring rest breaks through out. Patient exhibited good technique with therapeutic exercises/ activities, able to tolerate upgrades in resistance. Patient would benefit from continued  OT to improve overall ability with daily tasks.        Plan:   Focus on ROM and strengthening to improve ability to complete daily activites with ease.  POC 2/27/24 - 5/7/24

## 2024-04-25 ENCOUNTER — HOSPITAL ENCOUNTER (OUTPATIENT)
Dept: NEUROLOGY | Facility: HOSPITAL | Age: 74
End: 2024-04-25
Attending: STUDENT IN AN ORGANIZED HEALTH CARE EDUCATION/TRAINING PROGRAM
Payer: MEDICARE

## 2024-04-25 ENCOUNTER — OFFICE VISIT (OUTPATIENT)
Dept: OCCUPATIONAL THERAPY | Facility: CLINIC | Age: 74
End: 2024-04-25
Payer: MEDICARE

## 2024-04-25 DIAGNOSIS — M25.631 STIFFNESS OF RIGHT WRIST JOINT: ICD-10-CM

## 2024-04-25 DIAGNOSIS — G56.11 RIGHT MEDIAN NERVE NEUROPATHY: ICD-10-CM

## 2024-04-25 DIAGNOSIS — R20.0 NUMBNESS AND TINGLING IN RIGHT HAND: Primary | ICD-10-CM

## 2024-04-25 DIAGNOSIS — M25.641 JOINT STIFFNESS OF HAND, RIGHT: ICD-10-CM

## 2024-04-25 DIAGNOSIS — M79.89 SWELLING OF RIGHT HAND: ICD-10-CM

## 2024-04-25 DIAGNOSIS — R20.2 NUMBNESS AND TINGLING IN RIGHT HAND: Primary | ICD-10-CM

## 2024-04-25 PROBLEM — G56.03 CARPAL TUNNEL SYNDROME ON BOTH SIDES: Status: ACTIVE | Noted: 2024-04-25

## 2024-04-25 PROCEDURE — 95886 MUSC TEST DONE W/N TEST COMP: CPT | Performed by: PSYCHIATRY & NEUROLOGY

## 2024-04-25 PROCEDURE — 95911 NRV CNDJ TEST 9-10 STUDIES: CPT | Performed by: PSYCHIATRY & NEUROLOGY

## 2024-04-25 PROCEDURE — 95885 MUSC TST DONE W/NERV TST LIM: CPT | Performed by: PSYCHIATRY & NEUROLOGY

## 2024-04-25 PROCEDURE — 97530 THERAPEUTIC ACTIVITIES: CPT

## 2024-04-25 PROCEDURE — 97110 THERAPEUTIC EXERCISES: CPT

## 2024-04-25 NOTE — PROGRESS NOTES
Daily Note    Today's date: 2024  Patient name: Dominique Varela  : 1950  MRN: [id number]  Referring provider: Karolyn Valdovinos PA-C  Dx:   Encounter Diagnosis     ICD-10-CM    1. Numbness and tingling in right hand  R20.0     R20.2       2. Swelling of right hand  M79.89       3. Joint stiffness of hand, right  M25.641       4. Stiffness of right wrist joint  M25.631                      Subjective: Patient offers no functional changes this date.           Objective: See treatment diary below      Auth Tracker- NO AUTH REQ BOMN  Auth Status Total   Visits  Expiration date Co-Insurance   pending                                  Visit Tracker  Date 2/27 2/28 3/5 3/7 3/12 3/14    3/18 3/21 3/25 3/28   4/1 4/2  RE    4/5 4/9 4/11 4/12 4/15 4/16    4/18 4/19 4/22 4/24 4/25                                                     PMHx:   has a past medical history of Breast cyst, GERD (gastroesophageal reflux disease), and Macular degeneration.    Precautions: [place]    Manuals HEP 2024                        Ther Ex     Education on HEP and dx  x5min   Wrist PRE's x X15 2# 3 ways   Therapist assisted PROM wrist flex/ext & digit IP flexion/ext x x5min   Wrist extension stretch w/ powerweb  Red x10   PowerWeb digit flexion and rotate  Green 2x10   Supination/pronation hammer  X10 2# wts.   Varigrip Bert  Flexion x20, extension x20 50% resistance             Ther Act      Towel scrunch   X10; full fist hold at end   Digi flex  Green x10    Flex bar N's/U's  X10 red   Sponges grasp and release   Lvl 3 gripper   Dice remove with CP  Whole jar with Green clothespin   Custom night time extension splint                         Modalities     MHP  x5 min             Assessment: Tolerated treatment well with minimal complaints of muscle fatigue requiring rest breaks through out. Patient exhibited good technique with therapeutic exercises/ activities, able to tolerate upgrades in resistance. Patient would benefit from  continued OT to improve overall ability with daily tasks.      Plan:   Focus on ROM and strengthening to improve ability to complete daily activites with ease.  POC 2/27/24 - 5/7/24

## 2024-04-26 ENCOUNTER — APPOINTMENT (OUTPATIENT)
Dept: OCCUPATIONAL THERAPY | Facility: CLINIC | Age: 74
End: 2024-04-26
Payer: MEDICARE

## 2024-04-26 ENCOUNTER — TELEPHONE (OUTPATIENT)
Dept: PAIN MEDICINE | Facility: CLINIC | Age: 74
End: 2024-04-26

## 2024-04-26 DIAGNOSIS — G56.03 CARPAL TUNNEL SYNDROME ON BOTH SIDES: Primary | ICD-10-CM

## 2024-04-26 NOTE — TELEPHONE ENCOUNTER
----- Message from Rodney Keys MD sent at 4/26/2024  7:17 AM EDT -----  Patient's EMG consistent with carpal tunnel and not cervical radiculopathy. Recommend follow up with orthopedic hand.

## 2024-04-26 NOTE — TELEPHONE ENCOUNTER
S/w pt and advised. Verbalized understanding and given number for orthopedic surgery.  Pt aware to ask for hand specialist    Dr Keys would you please enter a referral

## 2024-04-29 ENCOUNTER — OFFICE VISIT (OUTPATIENT)
Dept: PHYSICAL THERAPY | Facility: CLINIC | Age: 74
End: 2024-04-29
Payer: MEDICARE

## 2024-04-29 ENCOUNTER — OFFICE VISIT (OUTPATIENT)
Dept: OCCUPATIONAL THERAPY | Facility: CLINIC | Age: 74
End: 2024-04-29
Payer: MEDICARE

## 2024-04-29 DIAGNOSIS — M75.121 NONTRAUMATIC COMPLETE TEAR OF RIGHT ROTATOR CUFF: ICD-10-CM

## 2024-04-29 DIAGNOSIS — Z98.890 S/P RIGHT ROTATOR CUFF REPAIR: Primary | ICD-10-CM

## 2024-04-29 DIAGNOSIS — R20.2 NUMBNESS AND TINGLING IN RIGHT HAND: Primary | ICD-10-CM

## 2024-04-29 DIAGNOSIS — M79.89 SWELLING OF RIGHT HAND: ICD-10-CM

## 2024-04-29 DIAGNOSIS — R20.0 NUMBNESS AND TINGLING IN RIGHT HAND: Primary | ICD-10-CM

## 2024-04-29 DIAGNOSIS — M25.641 JOINT STIFFNESS OF HAND, RIGHT: ICD-10-CM

## 2024-04-29 DIAGNOSIS — M25.631 STIFFNESS OF RIGHT WRIST JOINT: ICD-10-CM

## 2024-04-29 PROCEDURE — 97110 THERAPEUTIC EXERCISES: CPT

## 2024-04-29 PROCEDURE — 97530 THERAPEUTIC ACTIVITIES: CPT

## 2024-04-29 NOTE — PROGRESS NOTES
Daily Note    Today's date: 2024  Patient name: Dominique Varela  : 1950  MRN: [id number]  Referring provider: Karolyn Valdovinos PA-C  Dx:   Encounter Diagnosis     ICD-10-CM    1. Numbness and tingling in right hand  R20.0     R20.2       2. Joint stiffness of hand, right  M25.641       3. Stiffness of right wrist joint  M25.631       4. Swelling of right hand  M79.89                        Subjective: Patient states increased stiffness in R hand middle finger and had EMG test done in which she has moderate to serve CTS in bilateral wrists.           Objective: See treatment diary below      Auth Tracker- NO AUTH REQ BOMN  Auth Status Total   Visits  Expiration date Co-Insurance   pending                                  Visit Tracker  Date 2/27 2/28 3/5 3/7 3/12 3/14    3/18 3/21 3/25 3/28   4/1 4/2  RE    4/5 4/9 4/11 4/12 4/15 4/16    4/18 4/19 4/22 4/24 4/25 4/29       RE                                               PMHx:   has a past medical history of Breast cyst, GERD (gastroesophageal reflux disease), and Macular degeneration.    Precautions: [place]    Manuals HEP 2024                        Ther Ex     Education on HEP and dx  x5min   Wrist PRE's x X15 2# 3 ways   Therapist assisted PROM wrist flex/ext & digit IP flexion/ext x x5min   PowerWeb digit flexion and rotate  Green 2x10   Supination/pronation hammer  X10 2# wts.   Varigrip Bert  Flexion x20, extension x20 50% resistance             Ther Act      Towel scrunch   X10; full fist hold at end   Digi flex  Green x10    Flex bar N's/U's  X10 red   Sponges grasp and release   Lvl 3 gripper   Dice remove with CP  Whole jar with Green clothespin   Custom night time extension splint                         Modalities     MHP  x5 min             Assessment: Tolerated treatment well with minimal complaints of muscle fatigue requiring rest breaks through out. Patient exhibited good technique with therapeutic exercises/ activities, able to  tolerate upgrades in resistance. Patient would benefit from continued OT to improve overall ability with daily tasks.      Plan:   Focus on ROM and strengthening to improve ability to complete daily activites with ease.  POC 2/27/24 - 5/7/24

## 2024-04-29 NOTE — PROGRESS NOTES
Daily Note     Today's date: 2024  Patient name: Dominique Varela  : 1950  MRN: [id number]  Referring provider: Karolyn Valdovinos PA-C  Dx:   Encounter Diagnosis     ICD-10-CM    1. S/P right rotator cuff repair  Z98.890       2. Nontraumatic complete tear of right rotator cuff  M75.121           Start Time: 1420  Stop Time: 1500  Total time in clinic (min): 40 minutes    Patient treated by JOHN Dozier under supervision from this PT. We discussed the case to ensure appropriate continuation and progression of care and I reviewed the documentation.     Subjective: Patient reports her shoulder  is stiff today as she was pulled over by her dog and fell onto her outstretched hands. The patient reports she did not hit her head. Patient she is meeting with a surgeon to discuss carpal tunnel surgery on Monday.      Objective: See treatment diary below      Assessment: Tolerated treatment well. Patient  showed good improvement in AROM shoulder flexion and abduction . Patient displayed increased strength with less shoulder shrugging with lateral raises and shoulder flexion. In future sessions continue to build on functional overhead activities and strengthening .       Plan: Continue per plan of care.      Insurance:  AMA/CMS Eval/ Re-eval POC expires FOTO Auth Status Co-Insurance   CMS - [facility] 1/15/24 4/15/24   None req No     2/15/24 4/15/24           3/14/24 4/30/24          4/18/24 5/31/24         1.  1/15 2.   1 3.   1 4.    5.    6.   2/   7.   2/ 8.   2/8 9.   2/14 10.   2/15 RE 11.   2 12.   2   13.   2/ 14.   2 15.   3/5 16.   3/7 17.   3/12 18.   3/14 RE   19.   3/ 20.  3/ 21.  3/ 22.  3/ 23.  4/2 24.   4/9   25.   4/11 26.  2024 27.  4/18 RE 28.  4/ 29.  2024   30. 2024     31. 32.  33. 34. 35. 36.         Precautions: s/p R rotator cuff repair w/ biceps tenotomy 24       25  26 27 (RE-EVAL) 28 29 30    Manuals 24  4/22/2024 4/24/2024 4/29/2024      STM/MFR             Joint mobs            Neuromobility            Neuro Re-Ed            Cervical retraction            Rows 2x10 GTB  2x10 GTB 2x10 blue TB 2x15 blue tb  2x15 blue TB 2x15 black TB    Shoulder extensions 2x10 GTB  2x10 GTB 2x10 GTB 2x15 blue tb 2x15 blue TB 2x15     Shoulder isos             Scaption                                      Ther Ex            PROM shoulder     Flex, ER, abd       Pendulums            Cane flexion            Cane ER            Flexion AAROM w/ hands clasped 20x5s standing w/ ecc lowering  20x5s standing w/ ecc lowering  20x5s with ecc lowering 20x5s with ecc lowering  20x5s with ecc lowering  DC   Cane abd 15x5s with cane     2x10 AROM  15x5s with cane     2x10 AROM 2x10 AROM 2x10 AROM 20x with cane   DC   Table slides            Finger ladder            Pulleys 20x5s flex, abd  20x 5s flex abd 20x5s flex, abd 20x5s flex abd 20 x 5s flex, abd 20x5 flex abd    Supine shld flex AROM            Stand flexion AROM     2x10  2x10 2x10 2x10    Standing abd     2x10  2x10 2x10    Sidelying ER    2x10 sidelying  2x10 1lb      Sidelying abd      1lb 1# 2x10     Tband IR 2x10 GTB  2x10 GTB   Blue tb 2x10  Blue TB 2x10 Blue Tb 2x10    Tband ER 2x10 YTB  2x10 YTB  RTB 2x10 GTB 2x10 Blue 2x10     Behind the back stretch 5x15s   5x 15s 5x15s 5x15s 5x15s 5x15s    Lateral raises    1lb 2x10   2lb 2x10     Ther Activity            Pt education                                      Modalities            CP R shoulder                            Access Code: [email or web address]  URL: [email or web address]  Date: 02/01/2024  Prepared by: Irvin Abdul     Exercises  - Seated Scapular Retraction  - 2-3 x daily - 7 x weekly - 2 sets - 10 reps - 5 hold  - Seated Shoulder Shrugs  - 2-3 x daily - 7 x weekly - 2 sets - 10 reps - 5 hold  - Circular Shoulder Pendulum with Table Support  - 2-3 x daily - 7 x weekly - 1 sets - 30 reps  - Standing Elbow Flexion  Extension AROM  - 2-3 x daily - 7 x weekly - 2 sets - 10 reps - 5 hold  - Supine Shoulder Flexion with Dowel  - 1 x daily - 7 x weekly - 1 sets - 10 reps - 5 hold  - Supine Shoulder External Rotation with Dowel  - 1 x daily - 7 x weekly - 1 sets - 10 reps - 5 hold

## 2024-05-01 ENCOUNTER — OFFICE VISIT (OUTPATIENT)
Dept: OBGYN CLINIC | Facility: CLINIC | Age: 74
End: 2024-05-01
Payer: MEDICARE

## 2024-05-01 ENCOUNTER — APPOINTMENT (OUTPATIENT)
Dept: OCCUPATIONAL THERAPY | Facility: CLINIC | Age: 74
End: 2024-05-01
Payer: MEDICARE

## 2024-05-01 VITALS
DIASTOLIC BLOOD PRESSURE: 83 MMHG | HEIGHT: 64 IN | HEART RATE: 75 BPM | SYSTOLIC BLOOD PRESSURE: 128 MMHG | BODY MASS INDEX: 38.76 KG/M2 | WEIGHT: 227 LBS

## 2024-05-01 DIAGNOSIS — G56.03 CARPAL TUNNEL SYNDROME ON BOTH SIDES: ICD-10-CM

## 2024-05-01 PROCEDURE — 20526 THER INJECTION CARP TUNNEL: CPT | Performed by: STUDENT IN AN ORGANIZED HEALTH CARE EDUCATION/TRAINING PROGRAM

## 2024-05-01 PROCEDURE — 99213 OFFICE O/P EST LOW 20 MIN: CPT | Performed by: STUDENT IN AN ORGANIZED HEALTH CARE EDUCATION/TRAINING PROGRAM

## 2024-05-01 RX ORDER — BETAMETHASONE SODIUM PHOSPHATE AND BETAMETHASONE ACETATE 3; 3 MG/ML; MG/ML
6 INJECTION, SUSPENSION INTRA-ARTICULAR; INTRALESIONAL; INTRAMUSCULAR; SOFT TISSUE
Status: COMPLETED | OUTPATIENT
Start: 2024-05-01 | End: 2024-05-01

## 2024-05-01 RX ORDER — LIDOCAINE HYDROCHLORIDE 5 MG/ML
1 INJECTION, SOLUTION INFILTRATION; PERINEURAL
Status: COMPLETED | OUTPATIENT
Start: 2024-05-01 | End: 2024-05-01

## 2024-05-01 RX ADMIN — LIDOCAINE HYDROCHLORIDE 1 ML: 5 INJECTION, SOLUTION INFILTRATION; PERINEURAL at 11:30

## 2024-05-01 RX ADMIN — BETAMETHASONE SODIUM PHOSPHATE AND BETAMETHASONE ACETATE 6 MG: 3; 3 INJECTION, SUSPENSION INTRA-ARTICULAR; INTRALESIONAL; INTRAMUSCULAR; SOFT TISSUE at 11:30

## 2024-05-01 NOTE — PROGRESS NOTES
ASSESSMENT/PLAN:    Assessment:   Right carpal tunnel symptoms     Plan:   -She denies any current paresthesias to the right hand however reports right hand stiffness and pain particularly to the middle finger.  She had paresthesias to the right hand after her shoulder surgery however these have resolved with occupational therapy and time.  On EMG and nerve conduction study it does show compression neuropathy of the median nerve at the wrist however her symptoms do not correlate with this.  We discussed that sometimes carpal tunnel can present with pain and stiffness and I offered her a corticosteroid injection at the carpal tunnel for diagnostic purposes.  We discussed that if the corticosteroid injection improved her symptoms this could be symptoms from her carpal tunnel and a carpal tunnel release could potentially be helpful.      Follow Up:  PRN        _____________________________________________________  CHIEF COMPLAINT:  Chief Complaint   Patient presents with   • Left Hand - Numbness   • Right Hand - Numbness         SUBJECTIVE:  Dominique Varela is a 73 y.o. female who presents after a referral to me from Dr. Jared Keys.  She had right hand paresthesias to all digits after undergoing a rotator cuff repair this year in January.  She was seen by occupational therapy and paresthesias have subsequently resolved with therapy and nighttime splinting.  She continues to have right hand stiffness and pain particularly to the middle finger however denies any paresthesias.  She had an EMG and nerve conduction study that showed compression neuropathy of the median nerve at the carpal tunnel bilaterally however denies any symptoms to the left hand.  Denies any neck pain or radiating pain.    PAST MEDICAL HISTORY:  Past Medical History:   Diagnosis Date   • Breast cyst    • GERD (gastroesophageal reflux disease)     tums only   • Macular degeneration     left eye       PAST SURGICAL HISTORY:  Past Surgical History:    Procedure Laterality Date   • BREAST CYST EXCISION Right 1971    2 rt breast cysts removed   •  SECTION      and    • CHOLECYSTECTOMY     • COLONOSCOPY     • [place] SURGICAL ARTHROSCOPY SHOULDER LMTD DBRDMT  Right 2024    Procedure: Right shoulder arthroscopic rotator cuff repair, biceps tenotomy, Extensive Debridement;  Surgeon: Everette Keys MD;  Location: [facility];  Service: Orthopedics       FAMILY HISTORY:  Family History   Problem Relation Age of Onset   • Heart disease Father    • Breast cancer Other         unkown age   • No Known Problems Sister    • No Known Problems Daughter    • No Known Problems Maternal Grandmother    • No Known Problems Paternal Grandmother    • No Known Problems Maternal Aunt    • No Known Problems Maternal Aunt    • No Known Problems Paternal Aunt        SOCIAL HISTORY:  Social History     Tobacco Use   • Smoking status: Never     Passive exposure: Never   • Smokeless tobacco: Never   Vaping Use   • Vaping status: Never Used   Substance Use Topics   • Alcohol use: No   • Drug use: No       MEDICATIONS:    Current Outpatient Medications:   •  acetaminophen (TYLENOL) 500 mg tablet, Take 2 tablets (1,000 mg total) by mouth every 8 (eight) hours, Disp: 60 tablet, Rfl: 0  •  Coenzyme Q10 (CoQ10) 50 MG CAPS, Take by mouth, Disp: , Rfl:   •  Lutein 40 MG CAPS, Take by mouth, Disp: , Rfl:   •  Multiple Vitamins-Minerals (MULTIVITAMIN WITH MINERALS) tablet, Take 1 tablet by mouth daily, Disp: , Rfl:   •  capsicum (ZOSTRIX) 0.075 % topical cream, Apply topically 3 (three) times a day as needed (for nerve pain) (Patient not taking: Reported on 2024), Disp: 120 g, Rfl: 0  •  glucosamine-chondroitin 500-400 MG tablet, Take 1 tablet by mouth 3 (three) times a day (Patient not taking: Reported on 1/15/2024), Disp: , Rfl:   •  naproxen (Naprosyn) 500 mg tablet, Take 1 tablet (500 mg total) by mouth 2 (two) times a day with meals (Patient not taking:  "Reported on 1/15/2024), Disp: 20 tablet, Rfl: 0  •  ondansetron (ZOFRAN) 4 mg tablet, Take 1 tablet (4 mg total) by mouth every 8 (eight) hours as needed for nausea or vomiting (Patient not taking: Reported on 1/15/2024), Disp: 20 tablet, Rfl: 0  •  tolterodine (DETROL LA) 2 mg 24 hr capsule, Take 2 mg by mouth daily (Patient not taking: Reported on 5/1/2024), Disp: , Rfl:   No current facility-administered medications for this visit.    ALLERGIES:  No Known Allergies    REVIEW OF SYSTEMS:  Pertinent items are noted in HPI.  A comprehensive review of systems was negative.    LABS:  HgA1c:   Lab Results   Component Value Date    HGBA1C 5.9 (H) 12/28/2023     BMP:   Lab Results   Component Value Date    CALCIUM 9.3 12/28/2023    K 4.5 12/28/2023    CO2 30 12/28/2023     12/28/2023    BUN 20 12/28/2023    CREATININE 0.92 12/28/2023         _____________________________________________________  PHYSICAL EXAMINATION:  Vital signs: /83   Pulse 75   Ht 5' 4\" (1.626 m)   Wt 103 kg (227 lb)   BMI 38.96 kg/m²   General: well developed and well nourished, alert, oriented times 3, and appears comfortable  Psychiatric: Normal  HEENT: Trachea Midline, No torticollis  Cardiovascular: No discernable arrhythmia  Pulmonary: No wheezing or stridor  Abdomen: No rebound or guarding  Extremities: No peripheral edema  Skin: No masses, erythema, lacerations, fluctation, ulcerations  Neurovascular: Sensation Intact to the Median, Ulnar, Radial Nerve, Motor Intact to the Median, Ulnar, Radial Nerve, and Pulses Intact    MUSCULOSKELETAL EXAMINATION:  Right hand   Negative Tinel's at carpal tunnel   Negative flexion compression test at the carpal tunnel  Not tender to palpation at the A1 pulley of the middle finger, no clicking or catching noted  Make a full fist has full finger extension   APB strength 4/5  No thenar or hypothenar atrophy      _____________________________________________________  STUDIES REVIEWED:  EMG: Shows " compression neuropathy of the median nerve at the carpal tunnel bilaterally      PROCEDURES PERFORMED:  Hand/upper extremity injection: R carpal tunnel  [place] Protocol:  Consent: Verbal consent obtained.  Consent given by: patient  Timeout called at: 5/1/2024 12:14 PM.  Patient understanding: patient states understanding of the procedure being performed  Patient identity confirmed: verbally with patient  Supporting Documentation  Indications: diagnostic   Procedure Details  Condition:carpal tunnel syndrome Site: R carpal tunnel   Needle size: 22 G  Ultrasound guidance: no  Medications administered: 6 mg betamethasone acetate-betamethasone sodium phosphate 6 (3-3) mg/mL; 1 mL lidocaine 0.5 %

## 2024-05-03 ENCOUNTER — OFFICE VISIT (OUTPATIENT)
Dept: OCCUPATIONAL THERAPY | Facility: CLINIC | Age: 74
End: 2024-05-03
Payer: MEDICARE

## 2024-05-03 DIAGNOSIS — M79.89 SWELLING OF RIGHT HAND: ICD-10-CM

## 2024-05-03 DIAGNOSIS — R20.2 NUMBNESS AND TINGLING IN RIGHT HAND: Primary | ICD-10-CM

## 2024-05-03 DIAGNOSIS — M25.631 STIFFNESS OF RIGHT WRIST JOINT: ICD-10-CM

## 2024-05-03 DIAGNOSIS — M25.641 JOINT STIFFNESS OF HAND, RIGHT: ICD-10-CM

## 2024-05-03 DIAGNOSIS — R20.0 NUMBNESS AND TINGLING IN RIGHT HAND: Primary | ICD-10-CM

## 2024-05-03 PROCEDURE — 97530 THERAPEUTIC ACTIVITIES: CPT

## 2024-05-03 PROCEDURE — 97110 THERAPEUTIC EXERCISES: CPT

## 2024-05-03 NOTE — PROGRESS NOTES
Daily Note    Today's date: 5/3/2024  Patient name: Dominique Varela  : 1950  MRN: [id number]  Referring provider: Karolyn Valdovinos PA-C  Dx:   Encounter Diagnosis     ICD-10-CM    1. Numbness and tingling in right hand  R20.0     R20.2       2. Joint stiffness of hand, right  M25.641       3. Stiffness of right wrist joint  M25.631       4. Swelling of right hand  M79.89                    Subjective: Patient states she had an appointment with ortho surgeon post her EMG study for carpal tunnel syndrome and received a CSI which has been providing some relief.           Objective: See treatment diary below      Auth Tracker- NO AUTH REQ BOMN  Auth Status Total   Visits  Expiration date Co-Insurance   pending                                  Visit Tracker  Date 2/27 2/28 3/5 3/7 3/12 3/14    3/18 3/21 3/25 3/28   4/1 4/2  RE    4/5 4/9 4/11 4/12 4/15 4/16    4/18 4/19 4/22 4/24 4/25 4/29      5/3 RE                                               PMHx:   has a past medical history of Breast cyst, GERD (gastroesophageal reflux disease), and Macular degeneration.    Precautions: [place]    Manuals HEP 5/3/2024                        Ther Ex     Education on HEP and dx  x5min   Wrist PRE's x X15 2# 3 ways   Therapist assisted PROM wrist flex/ext & digit IP flexion/ext x x5min   PowerWeb digit flexion and rotate  Green 2x10   Supination/pronation hammer  X10 2# wts.   Varigrip Bert  Flexion x20, extension x20 50% resistance             Ther Act      Towel scrunch   X10; full fist hold at end   Digi flex  Green x10    Flex bar N's/U's  X10 green   Sponges grasp and release   Lvl 3 gripper   Dice remove with CP  Whole jar with Green clothespin   Custom night time extension splint     Yellow putty finding marbles, pinches,  squeezes  X6 marbles, x20 pinches, x5 squeezes                  Modalities     MHP  x5 min             Assessment: Patient tolerated treatment well with minimal complaints of muscle fatigue  requiring minimal rest breaks through out but able to perform TA and TE with increased resistance with good activity tolerance. Patient exhibited good technique with therapeutic exercises/ activities, able to tolerate upgrades in resistance. Patient would benefit from continued OT to improve overall ability with daily tasks.      Plan:   Focus on ROM and strengthening to improve ability to complete daily activites with ease.  POC 2/27/24 - 5/7/24

## 2024-05-06 ENCOUNTER — OFFICE VISIT (OUTPATIENT)
Dept: OCCUPATIONAL THERAPY | Facility: CLINIC | Age: 74
End: 2024-05-06
Payer: MEDICARE

## 2024-05-06 ENCOUNTER — OFFICE VISIT (OUTPATIENT)
Dept: OBGYN CLINIC | Facility: CLINIC | Age: 74
End: 2024-05-06
Payer: MEDICARE

## 2024-05-06 ENCOUNTER — OFFICE VISIT (OUTPATIENT)
Dept: PHYSICAL THERAPY | Facility: CLINIC | Age: 74
End: 2024-05-06
Payer: MEDICARE

## 2024-05-06 VITALS
BODY MASS INDEX: 38.76 KG/M2 | HEIGHT: 64 IN | WEIGHT: 227 LBS | DIASTOLIC BLOOD PRESSURE: 88 MMHG | HEART RATE: 71 BPM | SYSTOLIC BLOOD PRESSURE: 145 MMHG

## 2024-05-06 DIAGNOSIS — Z98.890 S/P RIGHT ROTATOR CUFF REPAIR: Primary | ICD-10-CM

## 2024-05-06 DIAGNOSIS — M25.631 STIFFNESS OF RIGHT WRIST JOINT: ICD-10-CM

## 2024-05-06 DIAGNOSIS — M75.121 NONTRAUMATIC COMPLETE TEAR OF RIGHT ROTATOR CUFF: ICD-10-CM

## 2024-05-06 DIAGNOSIS — R20.2 NUMBNESS AND TINGLING IN RIGHT HAND: ICD-10-CM

## 2024-05-06 DIAGNOSIS — M25.641 JOINT STIFFNESS OF HAND, RIGHT: Primary | ICD-10-CM

## 2024-05-06 DIAGNOSIS — R20.0 NUMBNESS AND TINGLING IN RIGHT HAND: ICD-10-CM

## 2024-05-06 DIAGNOSIS — M79.89 SWELLING OF RIGHT HAND: ICD-10-CM

## 2024-05-06 PROCEDURE — 97110 THERAPEUTIC EXERCISES: CPT

## 2024-05-06 PROCEDURE — 99213 OFFICE O/P EST LOW 20 MIN: CPT | Performed by: ORTHOPAEDIC SURGERY

## 2024-05-06 PROCEDURE — 97530 THERAPEUTIC ACTIVITIES: CPT

## 2024-05-06 NOTE — PROGRESS NOTES
"SUBJECTIVE:  Patient is a 73 y.o. year old female who presents for follow up now 4 months s/p  Right shoulder arthroscopic rotator cuff repair, biceps tenotomy, Extensive Debridement - Right performed on  1/4/2024.  Today, the patient continues to be pleased with her progress. The patient does note some soreness/\"stretching\" in the shoulder with internal rotation. She continues to attend PT and OT for carpal tunnel syndrome consistently. She notes persistent stiffness with making a fist, but notes numbness/tingling have resolved since her CSI by Dr. Issa on 5/1/24. The patient denies any new injury/trauma.     Of note, the patient does report her hands shake bilaterally, left more noticeable than the right. She notices this only with certain actions, such as drinking. She denies resting tremors, weakness, gait changes, or vision changes. The patient has not addressed this with her PCP.    Surgical Shoulder SANE Score: 85      VITALS:  Vitals:    05/06/24 0957   BP: 145/88   Pulse: 71       PHYSICAL EXAMINATION:  General: well developed and well nourished, alert, oriented times 3, and appears comfortable  Psychiatric: Normal    MUSCULOSKELETAL EXAMINATION:    Right Upper Extremity    Incision: well-healed  Range of Motion:  without pain, ER 50 without pain, IR T12 without significant pain  Strength: 4+/5 Empty Can Test with mild pain, 4+/5 ER, 5/5 IR  +Axillary/AIN/PIN/Ulnar Motor Function   SILT throughout   Palpable Radial pulse       PLAN:    I believe the patient is continuing to progress appropriately at the 4-month post-op perlita. She will continue physical therapy per provided protocol with focus on progressing rotator cuff strengthening. We discussed that she will continue to make noticeable strides in strength over the next couple of months and beyond the 6 month point. She may use OTC medications as needed for pain control.     Regarding her tremors, these do only occur with activity. We discussed " that this may be related to her carpal tunnel syndrome or other relatively benign etiologies, however, I do recommend the patient follow up with her PCP for further management of this as necessary.     Follow up in 6-8 weeks.

## 2024-05-06 NOTE — PROGRESS NOTES
Re-Evaluation Note    Today's date: 2024  Patient name: Dominique Varela  : 1950  MRN: [id number]  Referring provider: Karolyn Valdovinos PA-C  Dx:   Encounter Diagnosis     ICD-10-CM    1. Numbness and tingling in right hand  R20.0     R20.2       2. Joint stiffness of hand, right  M25.641       3. Swelling of right hand  M79.89       4. Stiffness of right wrist joint  M25.631                  Subjective: Patient states she has decreased stiffness since she has had the steroid injection.          Objective: See treatment diary below      Active Range of Motion     Left Wrist   Wrist flexion: 61 degrees   Wrist extension: 60 degrees     Right Wrist   Wrist flexion: 51 degrees   Wrist extension: 60 degrees     Left Thumb   Kapandji score: 10 degrees      Right Thumb   Kapandji score: 10 degrees      Strength/Myotome Testing     Left Wrist/Hand    (2nd hand position)     Trial 1: 60    Right Wrist/Hand    (2nd hand position)     Trial 1: 35 (+15 lbs)      Goals  Short term goals 2-4 weeks  Establish HEP to enhance performance with ADLs.   MET    Improve active range of motion of RUE by 20  to assist with UE dressing. MET    Achieve composite digital flexion to touch distal montes crease for grasp on utensils. Progressing 1/2 inch from DPC    In crease  strength by 10 lbs. to enhance gripping hand tools. MET        Long Term goals by discharge  Establish final home exercise program to enhance maximal functional level with ADLs.    Achieve functional active range of motion of RUE for full return to household chores. MET      Achieve functional strength of RUE for full return to high level ADLs.          Auth Tracker- NO AUTH REQ BOMN  Auth Status Total   Visits  Expiration date Co-Insurance   pending                                  Visit Tracker  Date 2/27 2/28 3/5 3/7 3/12 3/14    3/18 3/21 3/25 3/28   4/1 4/2  RE    4/5 4/9 4/11 4/12 4/15 4/16    4/18 4/19 4/22 4/24 4/25 4/29      5/3 5/6  RE                                                PMHx:   has a past medical history of Breast cyst, GERD (gastroesophageal reflux disease), and Macular degeneration.    Precautions: [place]    Manuals HEP 5/6/2024                        Ther Ex     Education on HEP and dx  x5min   Wrist PRE's x X15 2# 3 ways   Therapist assisted PROM wrist flex/ext & digit IP flexion/ext x x5min   PowerWeb digit flexion and rotate  Green 2x10   Supination/pronation hammer  X10 2# wts.   Varigrip Bert  Flexion x20, extension x20 50% resistance   Measurements  x        Ther Act      Towel scrunch   X10; full fist hold at end   Digi flex  Green x10  (not this session)   Flex bar N's/U's  X10 green   Sponges grasp and release   Lvl 3 gripper   Dice remove with CP  Whole jar with Green clothespin   Custom night time extension splint     Yellow putty pressing and  squeezes, jarciser  x                  Modalities     MHP  x5 min             Assessment: Progress note measurements taken today in which patient has achieved increased wrist AROM as well as increased  strength however still continues to have deficits in R hand  strength compared to contralateral side. Patient tolerated treatment well with minimal complaints of muscle fatigue requiring minimal rest breaks through out but able to perform TA and TE with increased resistance with good activity tolerance. Patient exhibited good technique with therapeutic exercises/ activities, able to tolerate upgrades in resistance. Patient would benefit from continued OT to improve overall ability with daily tasks.      Plan:   Focus on ROM and strengthening to improve ability to complete daily activites with ease.  POC 5/6/24 - 7/10/24

## 2024-05-06 NOTE — PROGRESS NOTES
Daily Note     Today's date: 2024  Patient name: Dominique Varela  : 1950  MRN: [id number]  Referring provider: Karolyn Valdovinos PA-C  Dx:   Encounter Diagnosis     ICD-10-CM    1. S/P right rotator cuff repair  Z98.890       2. Nontraumatic complete tear of right rotator cuff  M75.121                      Subjective: Patient reports her shoulder is doing well today. The patient reports she got a cortisone shot in her right wrist and she thinks it has helped.       Objective: See treatment diary below; Patient was co-treated with JOHN Dozier, under my direct supervision.        Assessment: Tolerated treatment well. Patient  displayed increased standing active shoulder range of motion in flexion and abduction with less compensatory movement. Patient was able to increase the resistance on shoulder strengthening exercises from theraband to cable column without any self-reported pain or discomfort.  In future sessions, continue to increase function shoulder strengthening.      Plan: Continue per plan of care.           Insurance:  AMA/CMS Eval/ Re-eval POC expires FOTO Auth Status Co-Insurance   CMS - [facility] 1/15/24 4/15/24   None req No     2/15/24 4/15/24           3/14/24 4/30/24          4/18/24 5/31/24         1.  1/15 2.   1/ 3.   1 4.    5.   1 6.   2/   7.   2/5 8.   2/8 9.   2/14 10.   2/15 RE 11.   2 12.   2   13.   2 14.   2 15.   3/5 16.   3/7 17.   3/12 18.   3/14 RE   19.   3/19 20.  3/21 21.  3/26 22.  3/28 23.  4/2 24.   4/9   25.   4/11 26.  2024 27.  4/ RE 28.  4/ 29.  2024   30. 2024     31.  5/6 32.  33. 34. 35. 36.         Precautions: s/p R rotator cuff repair w/ biceps tenotomy 24       25  26 27 (RE-EVAL) 28 29 30 31   Manuals 24     STM/MFR             Joint mobs            Neuromobility            Neuro Re-Ed            Cervical retraction            Rows 2x10 GTB   2x10 GTB 2x10 blue TB 2x15 blue tb  2x15 blue TB 2x15 black TB 2x15 12.5#   Shoulder extensions 2x10 GTB  2x10 GTB 2x10 GTB 2x15 blue tb 2x15 blue TB 2x15  2x15 12.5#   Shoulder isos             Scaption                                      Ther Ex            PROM shoulder     Flex, ER, abd       Pendulums            Cane flexion            Cane ER            Flexion AAROM w/ hands clasped 20x5s standing w/ ecc lowering  20x5s standing w/ ecc lowering  20x5s with ecc lowering 20x5s with ecc lowering  20x5s with ecc lowering  20x5s with eccentric lowering   Cane abd 15x5s with cane     2x10 AROM  15x5s with cane     2x10 AROM 2x10 AROM 2x10 AROM 20x with cane   DC   Table slides            Finger ladder            Pulleys 20x5s flex, abd  20x 5s flex abd 20x5s flex, abd 20x5s flex abd 20 x 5s flex, abd 20x5 flex abd 20x5 flx abd   Supine shld flex AROM            Stand flexion AROM     2x10  2x10 2x10 2x10 2x10   Standing abd     2x10  2x10 2x10 2x10   Sidelying ER    2x10 sidelying  2x10 1lb   3x10 2#   Ball circles       30x   Sidelying abd      1lb 1# 2x10     Tband IR 2x10 GTB  2x10 GTB   Blue tb 2x10  Blue TB 2x10 Blue Tb 2x10 2.5# 2x10   Tband ER 2x10 YTB  2x10 YTB  RTB 2x10 GTB 2x10 Blue 2x10  2.5# 2x10   Behind the back stretch 5x15s   5x 15s 5x15s 5x15s 5x15s 5x15s 5x15s   Lateral raises    1lb 2x10   2lb 2x10  2# 2x10   Face pulls           Shoulder press.          Ther Activity            Pt education                                      Modalities            CP R shoulder                            Access Code: [email or web address]  URL: [email or web address]  Date: 02/01/2024  Prepared by: Irvin Abdul     Exercises  - Seated Scapular Retraction  - 2-3 x daily - 7 x weekly - 2 sets - 10 reps - 5 hold  - Seated Shoulder Shrugs  - 2-3 x daily - 7 x weekly - 2 sets - 10 reps - 5 hold  - Circular Shoulder Pendulum with Table Support  - 2-3 x daily - 7 x weekly - 1 sets - 30 reps  - Standing Elbow Flexion  Extension AROM  - 2-3 x daily - 7 x weekly - 2 sets - 10 reps - 5 hold  - Supine Shoulder Flexion with Dowel  - 1 x daily - 7 x weekly - 1 sets - 10 reps - 5 hold  - Supine Shoulder External Rotation with Dowel  - 1 x daily - 7 x weekly - 1 sets - 10 reps - 5 hold

## 2024-05-08 ENCOUNTER — OFFICE VISIT (OUTPATIENT)
Dept: PHYSICAL THERAPY | Facility: CLINIC | Age: 74
End: 2024-05-08
Payer: MEDICARE

## 2024-05-08 ENCOUNTER — OFFICE VISIT (OUTPATIENT)
Dept: OCCUPATIONAL THERAPY | Facility: CLINIC | Age: 74
End: 2024-05-08
Payer: MEDICARE

## 2024-05-08 DIAGNOSIS — M25.641 JOINT STIFFNESS OF HAND, RIGHT: ICD-10-CM

## 2024-05-08 DIAGNOSIS — M25.631 STIFFNESS OF RIGHT WRIST JOINT: ICD-10-CM

## 2024-05-08 DIAGNOSIS — M75.121 NONTRAUMATIC COMPLETE TEAR OF RIGHT ROTATOR CUFF: ICD-10-CM

## 2024-05-08 DIAGNOSIS — R20.2 NUMBNESS AND TINGLING IN RIGHT HAND: Primary | ICD-10-CM

## 2024-05-08 DIAGNOSIS — M79.89 SWELLING OF RIGHT HAND: ICD-10-CM

## 2024-05-08 DIAGNOSIS — R20.0 NUMBNESS AND TINGLING IN RIGHT HAND: Primary | ICD-10-CM

## 2024-05-08 DIAGNOSIS — Z98.890 S/P RIGHT ROTATOR CUFF REPAIR: Primary | ICD-10-CM

## 2024-05-08 PROCEDURE — 97112 NEUROMUSCULAR REEDUCATION: CPT

## 2024-05-08 PROCEDURE — 97110 THERAPEUTIC EXERCISES: CPT

## 2024-05-08 PROCEDURE — 97530 THERAPEUTIC ACTIVITIES: CPT

## 2024-05-08 NOTE — PROGRESS NOTES
Daily Note    Today's date: 2024  Patient name: Dominique Varela  : 1950  MRN: [id number]  Referring provider: Karolyn Valdovinos PA-C  Dx:   Encounter Diagnosis     ICD-10-CM    1. Numbness and tingling in right hand  R20.0     R20.2       2. Swelling of right hand  M79.89       3. Joint stiffness of hand, right  M25.641       4. Stiffness of right wrist joint  M25.631                  Subjective: Patient states she has wrist pain at start of session.         Objective: See treatment diary below      Auth Tracker- NO AUTH REQ BOMN  Auth Status Total   Visits  Expiration date Co-Insurance   pending                                  Visit Tracker  Date 2/27 2/28 3/5 3/7 3/12 3/14    3/18 3/21 3/25 3/28   4/1 4/2  RE    4/5 4/9 4/11 4/12 4/15 4/16    4/18 4/19 4/22 4/24 4/25 4/29      5/3 5/6  RE                                               PMHx:   has a past medical history of Breast cyst, GERD (gastroesophageal reflux disease), and Macular degeneration.    Precautions: [place]    Manuals HEP 2024                        Ther Ex     Education on HEP and dx  x5min   Wrist PRE's x X15 2# 3 ways   Therapist assisted PROM wrist flex/ext & digit IP flexion/ext x x5min   Supination/pronation hammer  X10 2# wts.   Measurements          Ther Act      Towel scrunch   X10; full fist hold at end   Digi flex  Green x10     Flex bar N's/U's  X10 green   Sponges grasp and release   Lvl 3 gripper   Dice remove with CP  Whole jar with Green clothespin   Custom night time extension splint     Yellow putty pressing and  squeezes, jarciser  X10 presses using 2# dumbbells                   Modalities     MHP  x5 min             Assessment: Patient tolerated treatment well with minimal complaints of muscle fatigue requiring minimal rest breaks through out but able to perform TA and TE with increased resistance with good activity tolerance. Patient exhibited good technique with therapeutic exercises/ activities. Patient  would benefit from continued OT to improve overall ability with daily tasks.      Plan:   Focus on ROM and strengthening to improve ability to complete daily activites with ease.  POC 5/6/24 - 7/10/24

## 2024-05-08 NOTE — PROGRESS NOTES
"Daily Note     Today's date: 2024  Patient name: Dominique Varela  : 1950  MRN: [id number]  Referring provider: Karolyn Valdovinos PA-C  Dx:   Encounter Diagnosis     ICD-10-CM    1. S/P right rotator cuff repair  Z98.890       2. Nontraumatic complete tear of right rotator cuff  M75.121                      Subjective: Patient reports her shoulder was sore following treatment last session and she thinks it could be from increasing the weight on shoulder ER or IR. Patient reports she is still having the lateral shoulder muscle soreness following last session. Pt states that she received results from her EMG and found out she had \"carpal tunnel\" from her right elbow to the right hand and \"all throughout\" the left arm. Pt states she has no symptoms in her left arm.       Objective: See treatment diary below      Assessment: Tolerated treatment well. Patient  demonstrated good form with lateral raises without using and compensatory shoulder shrugging.  Patient required verbal cueing for proper form during shoulder IR: to keep the elbow tight to the body throughout the exercise. Patient reported some lightheadedness while completing ball circles and looking upwards so she was instructed to sit and rest. Pt notes a history of vertigo. The symptoms abolished after rest and drinking cold water.        Plan: Continue per plan of care.        Insurance:  AMA/CMS Eval/ Re-eval POC expires FOTO Auth Status Co-Insurance   CMS - [facility] 1/15/24 4/15/24   None req No     2/15/24 4/15/24           3/14/24 4/30/24          4/18/24 5/31/24         1.  1/15 2.   1 3.   1 4.    5.    6.   2/1   7.   2/5 8.   2/8 9.   214 10.   2/15 RE 11.   2/ 12.   2   13.   2/ 14.   2 15.   3/5 16.   3/7 17.   3/12 18.   3/14 RE   19.   3/19 20.  3/21 21.  3/26 22.  3/28 23.  4/2 24.   4/9   25.   4/11 26.  2024 27.  4/18 RE 28.  4/22 29.  2024   30. 2024     31.  5/6 32.   2024   33. 34. 35. 36.       "   Precautions: s/p R rotator cuff repair w/ biceps tenotomy 1/4/24       29 30 31 32     Manuals 4/24/2024 4/29/2024 5/6/2024 5/8/2024       STM/MFR          Joint mobs         Neuromobility         Neuro Re-Ed         Cervical retraction         Rows 2x15 blue TB 2x15 black TB 2x15 12.5# 2x15 12.5#     Shoulder extensions 2x15 blue TB 2x15  2x15 12.5# 2x15 12.5#     Shoulder isos          Scaption                             Ther Ex         PROM shoulder         Pendulums         Cane flexion         Cane ER         Flexion AAROM w/ hands clasped 20x5s with ecc lowering  20x5s with ecc lowering  20x5s with eccentric lowering      Cane abd 20x with cane   DC      Table slides         Finger ladder         Pulleys 20 x 5s flex, abd 20x5 flex abd 20x5 flx abd 20x5s flex abd     Supine shld flex AROM         Stand flexion AROM 2x10 2x10 2x10 2x15     Standing abd 2x10 2x10 2x10 2x15     Sidelying ER   3x10 2#      Ball circles   30x 30x     Wall slides with band    Wall walks          No monies     2x15 rtb     Sidelying abd  1lb 1# 2x10        Tband IR Blue TB 2x10 Blue Tb 2x10 2.5# 2x10 GTB both handles 2x15     Tband ER GTB 2x10 Blue 2x10  2.5# 2x10 GTB both handle 2x15     Behind the back stretch 5x15s 5x15s 5x15s 5x15s     Lateral raises  2lb 2x10  2# 2x10 2# 2x10     Face pulls          Shoulder press.    #2 2x10      Ther Activity         Pt education                             Modalities         CP R shoulder                      Access Code: [email or web address]  URL: [email or web address]  Date: 02/01/2024  Prepared by: Irvin Abdul     Exercises  - Seated Scapular Retraction  - 2-3 x daily - 7 x weekly - 2 sets - 10 reps - 5 hold  - Seated Shoulder Shrugs  - 2-3 x daily - 7 x weekly - 2 sets - 10 reps - 5 hold  - Circular Shoulder Pendulum with Table Support  - 2-3 x daily - 7 x weekly - 1 sets - 30 reps  - Standing Elbow Flexion Extension AROM  - 2-3 x daily - 7 x weekly - 2 sets - 10 reps - 5  hold  - Supine Shoulder Flexion with Dowel  - 1 x daily - 7 x weekly - 1 sets - 10 reps - 5 hold  - Supine Shoulder External Rotation with Dowel  - 1 x daily - 7 x weekly - 1 sets - 10 reps - 5 hold

## 2024-05-10 ENCOUNTER — OFFICE VISIT (OUTPATIENT)
Dept: OCCUPATIONAL THERAPY | Facility: CLINIC | Age: 74
End: 2024-05-10
Payer: MEDICARE

## 2024-05-10 DIAGNOSIS — R20.0 NUMBNESS AND TINGLING IN RIGHT HAND: Primary | ICD-10-CM

## 2024-05-10 DIAGNOSIS — M25.641 JOINT STIFFNESS OF HAND, RIGHT: ICD-10-CM

## 2024-05-10 DIAGNOSIS — R20.2 NUMBNESS AND TINGLING IN RIGHT HAND: Primary | ICD-10-CM

## 2024-05-10 DIAGNOSIS — M25.631 STIFFNESS OF RIGHT WRIST JOINT: ICD-10-CM

## 2024-05-10 DIAGNOSIS — M79.89 SWELLING OF RIGHT HAND: ICD-10-CM

## 2024-05-10 PROCEDURE — 97530 THERAPEUTIC ACTIVITIES: CPT

## 2024-05-10 PROCEDURE — 97110 THERAPEUTIC EXERCISES: CPT

## 2024-05-10 NOTE — PROGRESS NOTES
Daily Note    Today's date: 5/10/2024  Patient name: Dominique Varela  : 1950  MRN: [id number]  Referring provider: Karolyn Valdovinos PA-C  Dx:   Encounter Diagnosis     ICD-10-CM    1. Numbness and tingling in right hand  R20.0     R20.2       2. Swelling of right hand  M79.89       3. Stiffness of right wrist joint  M25.631       4. Joint stiffness of hand, right  M25.641                    Subjective: Patient states she has wrist pain at start of session.         Objective: See treatment diary below      Auth Tracker- NO AUTH REQ BOMN  Auth Status Total   Visits  Expiration date Co-Insurance   pending                                  Visit Tracker  Date 2/27 2/28 3/5 3/7 3/12 3/14    3/18 3/21 3/25 3/28   4/1 4/2  RE    4/5 4/9 4/11 4/12 4/15 4/16    4/18 4/19 4/22 4/24 4/25 4/29      5/3 5/6  RE 5/8 5/10                                             PMHx:   has a past medical history of Breast cyst, GERD (gastroesophageal reflux disease), and Macular degeneration.    Precautions: [place]    Manuals HEP 5/10/2024                        Ther Ex     Education on HEP and dx  x5min   Wrist PRE's x X15 2# 3 ways   Therapist assisted PROM wrist flex/ext & digit IP flexion/ext x x5min   Supination/pronation hammer  X10 2# wts.   Measurements          Ther Act      Towel scrunch   X10; full fist hold at end   Digi flex  Green x10     Flex bar N's/U's  X10 green   Sponges grasp and release   Lvl 3 gripper   Dice remove with CP  Whole jar with reverse Green clothespin (upgrade next session)   Custom night time extension splint     Yellow putty pressing and  squeezes, jarciser  X10 presses using 2# dumbbells                   Modalities     MHP  x5 min             Assessment: Patient tolerated treatment well with minimal complaints of muscle fatigue requiring minimal rest breaks through out but able to perform TA and TE with increased resistance with good activity tolerance. Patient exhibited good technique with  therapeutic exercises/ activities. Patient would benefit from continued OT to improve overall ability with daily tasks.      Plan:   Focus on ROM and strengthening to improve ability to complete daily activites with ease.  POC 5/6/24 - 7/10/24

## 2024-05-13 ENCOUNTER — OFFICE VISIT (OUTPATIENT)
Dept: OCCUPATIONAL THERAPY | Facility: CLINIC | Age: 74
End: 2024-05-13
Payer: MEDICARE

## 2024-05-13 ENCOUNTER — OFFICE VISIT (OUTPATIENT)
Dept: PHYSICAL THERAPY | Facility: CLINIC | Age: 74
End: 2024-05-13
Payer: MEDICARE

## 2024-05-13 DIAGNOSIS — R20.2 NUMBNESS AND TINGLING IN RIGHT HAND: Primary | ICD-10-CM

## 2024-05-13 DIAGNOSIS — M25.641 JOINT STIFFNESS OF HAND, RIGHT: ICD-10-CM

## 2024-05-13 DIAGNOSIS — M79.89 SWELLING OF RIGHT HAND: ICD-10-CM

## 2024-05-13 DIAGNOSIS — R20.0 NUMBNESS AND TINGLING IN RIGHT HAND: Primary | ICD-10-CM

## 2024-05-13 DIAGNOSIS — M25.631 STIFFNESS OF RIGHT WRIST JOINT: ICD-10-CM

## 2024-05-13 DIAGNOSIS — M75.121 NONTRAUMATIC COMPLETE TEAR OF RIGHT ROTATOR CUFF: ICD-10-CM

## 2024-05-13 DIAGNOSIS — Z98.890 S/P RIGHT ROTATOR CUFF REPAIR: Primary | ICD-10-CM

## 2024-05-13 PROCEDURE — 97110 THERAPEUTIC EXERCISES: CPT

## 2024-05-13 PROCEDURE — 97112 NEUROMUSCULAR REEDUCATION: CPT

## 2024-05-13 PROCEDURE — 97530 THERAPEUTIC ACTIVITIES: CPT

## 2024-05-13 NOTE — PROGRESS NOTES
Daily Note      Today's date: 2024  Patient name: Dominique Varela  : 1950  MRN: [id number]  Referring provider: Karolyn Valdovinos PA-C  Dx:   Encounter Diagnosis     ICD-10-CM    1. S/P right rotator cuff repair  Z98.890       2. Nontraumatic complete tear of right rotator cuff  M75.121           Subjective: Pt reports that her arm continues to get better. Pt states that she slept on her stomach for the first time the other day. Pt states she often moves around in her sleep.        Objective: See treatment diary below    Assessment: Pt tolerated treatment well.  Pt presents with decreased compensatory shoulder hiking during both scaption and abduction with weights. Pt presented with limited motion during shoulder ER with double theraband, so this was decreased to single theraband and pt demonstrated better ROM. Pt demonstrates good progress toward normal AROM and is improving with strength exercises. Discuss D/C planning with pt anticipated by the end of this month.     Plan: Continue per plan of care.           Insurance:  AMA/CMS Eval/ Re-eval POC expires FOTO Auth Status Co-Insurance   CMS - [facility] 1/15/24 4/15/24   None req No     2/15/24 4/15/24           3/14/24 4/30/24          4/18/24 5/31/24         1.  1/15 2.   1/ 3.   1 4.    5.    6.   2/1   7.   2/5 8.   2/8 9.   2/14 10.   2/15 RE 11.   2/20 12.   2   13.   2 14.   2 15.   3/5 16.   3/7 17.   3/12 18.   3/14 RE   19.   3/19 20.  3/21 21.  3/ 22.  3/ 23.  4/2 24.   4/9   25.   4/11 26.  2024 27.  4/18 RE 28.  4/22 29.  2024   30. 2024     31.  5/6 32.   2024   33.  5/13 34. 35. 36.         Precautions: s/p R rotator cuff repair w/ biceps tenotomy 24       29 30 31 32 33    Manuals 2024    STM/MFR          Joint mobs         Neuromobility         Neuro Re-Ed         Cervical retraction         Rows 2x15 blue TB 2x15 black TB 2x15 12.5# 2x15 12.5# 30x  17.5#    Shoulder extensions 2x15 blue TB 2x15  2x15 12.5# 2x15 12.5# 30x 12.5#    Shoulder isos          Scaption     2x15 AROM  2x10 2#                        Ther Ex         PROM shoulder         Pendulums         Cane flexion         Cane ER         Flexion AAROM w/ hands clasped 20x5s with ecc lowering  20x5s with ecc lowering  20x5s with eccentric lowering      Cane abd 20x with cane   DC      Pulleys 20 x 5s flex, abd 20x5 flex abd 20x5 flx abd 20x5s flex abd Towel slides flex and abd 20x5s ea.    Supine shld flex AROM         Stand flexion AROM 2x10 2x10 2x10 2x15     Standing abd 2x10 2x10 2x10 2x15 2x15     Sidelying ER   3x10 2#  2x10 2#    Ball circles   30x 30x     Wall slides with band    Wall walks          No monies     2x15 rtb     Sidelying abd  1lb 1# 2x10    2x10 2#    Tband IR Blue TB 2x10 Blue Tb 2x10 2.5# 2x10 GTB both handles 2x15 GTB both handles 2x15    Tband ER GTB 2x10 Blue 2x10  2.5# 2x10 GTB both handle 2x15 GTB single handle 2x15    Behind the back stretch 5x15s 5x15s 5x15s 5x15s     Lateral raises  2lb 2x10  2# 2x10 2# 2x10 2x10 2#    Face pulls          Shoulder press    #2 2x10  2x10 2# standing    Ther Activity         Pt education                             Modalities         CP R shoulder                      Access Code: [email or web address]  URL: [email or web address]  Date: 02/01/2024  Prepared by: Irvin Abdul     Exercises  - Seated Scapular Retraction  - 2-3 x daily - 7 x weekly - 2 sets - 10 reps - 5 hold  - Seated Shoulder Shrugs  - 2-3 x daily - 7 x weekly - 2 sets - 10 reps - 5 hold  - Circular Shoulder Pendulum with Table Support  - 2-3 x daily - 7 x weekly - 1 sets - 30 reps  - Standing Elbow Flexion Extension AROM  - 2-3 x daily - 7 x weekly - 2 sets - 10 reps - 5 hold  - Supine Shoulder Flexion with Dowel  - 1 x daily - 7 x weekly - 1 sets - 10 reps - 5 hold  - Supine Shoulder External Rotation with Dowel  - 1 x daily - 7 x weekly - 1 sets - 10 reps - 5 hold

## 2024-05-13 NOTE — PROGRESS NOTES
Daily Note    Today's date: 2024  Patient name: Dominique Varela  : 1950  MRN: [id number]  Referring provider: Karolyn Valdovinos PA-C  Dx:   Encounter Diagnosis     ICD-10-CM    1. Numbness and tingling in right hand  R20.0     R20.2       2. Joint stiffness of hand, right  M25.641       3. Stiffness of right wrist joint  M25.631       4. Swelling of right hand  M79.89                      Subjective: Patient states no pain at start of session.         Objective: See treatment diary below      Auth Tracker- NO AUTH REQ BOMN  Auth Status Total   Visits  Expiration date Co-Insurance   pending                                  Visit Tracker  Date 2/27 2/28 3/5 3/7 3/12 3/14    3/18 3/21 3/25 3/28   4/1 4/2  RE    4/5 4/9 4/11 4/12 4/15 4/16    4/18 4/19 4/22 4/24 4/25 4/29      5/3 5/6  RE 5/8 5/10 5/13                                            PMHx:   has a past medical history of Breast cyst, GERD (gastroesophageal reflux disease), and Macular degeneration.    Precautions: [place]    Manuals HEP 2024                        Ther Ex     Education on HEP and dx  x5min   Wrist PRE's x X15 3# 3 ways   Therapist assisted PROM wrist flex/ext & digit IP flexion/ext x x5min   Supination/pronation hammer  X10 2# wts.   Measurements          Ther Act      Towel scrunch   X10; full fist hold at end   Digi flex  Green 2x10     Flex bar N's/U's  X10 green   Sponges grasp and release   Lvl 3 gripper   Dice remove with CP  Whole jar with reverse Green clothespin   Custom night time extension splint     Yellow putty pressing and  squeezes, jarciser  X10 presses using 2# dumbbells and x10 jarciser                   Modalities     MHP  x5 min             Assessment: Patient tolerated treatment well with minimal complaints of muscle fatigue requiring minimal rest breaks through out but able to perform TA and TE with increased resistance with good activity tolerance. Patient exhibited good technique with therapeutic  exercises/ activities. Patient would benefit from continued OT to improve overall ability with daily tasks.      Plan:   Focus on ROM and strengthening to improve ability to complete daily activites with ease.  POC 5/6/24 - 7/10/24

## 2024-05-15 ENCOUNTER — OFFICE VISIT (OUTPATIENT)
Dept: OCCUPATIONAL THERAPY | Facility: CLINIC | Age: 74
End: 2024-05-15
Payer: MEDICARE

## 2024-05-15 DIAGNOSIS — M79.89 SWELLING OF RIGHT HAND: ICD-10-CM

## 2024-05-15 DIAGNOSIS — R20.0 NUMBNESS AND TINGLING IN RIGHT HAND: Primary | ICD-10-CM

## 2024-05-15 DIAGNOSIS — M25.631 STIFFNESS OF RIGHT WRIST JOINT: ICD-10-CM

## 2024-05-15 DIAGNOSIS — M25.641 JOINT STIFFNESS OF HAND, RIGHT: ICD-10-CM

## 2024-05-15 DIAGNOSIS — R20.2 NUMBNESS AND TINGLING IN RIGHT HAND: Primary | ICD-10-CM

## 2024-05-15 PROCEDURE — 97530 THERAPEUTIC ACTIVITIES: CPT

## 2024-05-15 PROCEDURE — 97110 THERAPEUTIC EXERCISES: CPT

## 2024-05-15 NOTE — PROGRESS NOTES
Daily Note    Today's date: 5/15/2024  Patient name: Dominique Varela  : 1950  MRN: [id number]  Referring provider: Karolyn Valdovinos PA-C  Dx:   Encounter Diagnosis     ICD-10-CM    1. Numbness and tingling in right hand  R20.0     R20.2       2. Joint stiffness of hand, right  M25.641       3. Stiffness of right wrist joint  M25.631       4. Swelling of right hand  M79.89                        Subjective: Patient states no pain at start of session.         Objective: See treatment diary below      Auth Tracker- NO AUTH REQ BOMN  Auth Status Total   Visits  Expiration date Co-Insurance   pending                                  Visit Tracker  Date 2/27 2/28 3/5 3/7 3/12 3/14    3/18 3/21 3/25 3/28   4/1 4/2  RE    4/5 4/9 4/11 4/12 4/15 4/16    4/18 4/19 4/22 4/24 4/25 4/29      5/3 5/6  RE 5/8 5/10 5/13 5/15                                           PMHx:   has a past medical history of Breast cyst, GERD (gastroesophageal reflux disease), and Macular degeneration.    Precautions: [place]    Manuals HEP 5/15/2024                        Ther Ex     Education on HEP and dx  x5min   Bicep curl's/hammer curls  X10 each 4#   Therapist assisted PROM wrist flex/ext & digit IP flexion/ext x x5min   Supination/pronation hammer  X10 2# wts.   Measurements          Ther Act      Towel scrunch   X10; full fist hold at end   Digi flex  Blue 2x10     Flex bar N's/U's  X10 green   Sponges grasp and release   Lvl 3 gripper   Dice remove with CP  Whole jar with reverse Green clothespin   Custom night time extension splint     Yellow putty pressing and  squeezes, jarciser  X10 presses using 2# dumbbells and x10 jarciser                   Modalities     MHP  x5 min             Assessment: Patient tolerated treatment well with minimal complaints of muscle fatigue requiring minimal rest breaks through out but able to perform TA and TE with increased resistance with good activity tolerance. Patient exhibited good technique with  therapeutic exercises/ activities. Patient would benefit from continued OT to improve overall ability with daily tasks.      Plan:   Focus on ROM and strengthening to improve ability to complete daily activites with ease.  POC 5/6/24 - 7/10/24

## 2024-05-17 ENCOUNTER — APPOINTMENT (OUTPATIENT)
Dept: OCCUPATIONAL THERAPY | Facility: CLINIC | Age: 74
End: 2024-05-17
Payer: MEDICARE

## 2024-05-22 ENCOUNTER — APPOINTMENT (OUTPATIENT)
Dept: PHYSICAL THERAPY | Facility: CLINIC | Age: 74
End: 2024-05-22
Payer: MEDICARE

## 2024-05-22 ENCOUNTER — APPOINTMENT (OUTPATIENT)
Dept: OCCUPATIONAL THERAPY | Facility: CLINIC | Age: 74
End: 2024-05-22
Payer: MEDICARE

## 2024-05-22 ENCOUNTER — OFFICE VISIT (OUTPATIENT)
Dept: OCCUPATIONAL THERAPY | Facility: CLINIC | Age: 74
End: 2024-05-22
Payer: MEDICARE

## 2024-05-22 DIAGNOSIS — R20.0 NUMBNESS AND TINGLING IN RIGHT HAND: Primary | ICD-10-CM

## 2024-05-22 DIAGNOSIS — M25.641 JOINT STIFFNESS OF HAND, RIGHT: ICD-10-CM

## 2024-05-22 DIAGNOSIS — M79.89 SWELLING OF RIGHT HAND: ICD-10-CM

## 2024-05-22 DIAGNOSIS — R20.2 NUMBNESS AND TINGLING IN RIGHT HAND: Primary | ICD-10-CM

## 2024-05-22 DIAGNOSIS — M25.631 STIFFNESS OF RIGHT WRIST JOINT: ICD-10-CM

## 2024-05-22 PROCEDURE — 97110 THERAPEUTIC EXERCISES: CPT

## 2024-05-22 PROCEDURE — 97530 THERAPEUTIC ACTIVITIES: CPT

## 2024-05-22 NOTE — PROGRESS NOTES
Daily Note    Today's date: 2024  Patient name: Dominique Varela  : 1950  MRN: [id number]  Referring provider: Karolyn Valdovinos PA-C  Dx:   Encounter Diagnosis     ICD-10-CM    1. Numbness and tingling in right hand  R20.0     R20.2       2. Joint stiffness of hand, right  M25.641       3. Stiffness of right wrist joint  M25.631       4. Swelling of right hand  M79.89                  Subjective: Patient states no pain at start of session.           Objective: See treatment diary below      Auth Tracker- NO AUTH REQ BOMN  Auth Status Total   Visits  Expiration date Co-Insurance   pending                                  Visit Tracker  Date 2/27 2/28 3/5 3/7 3/12 3/14    3/18 3/21 3/25 3/28   4/1 4/2  RE    4/5 4/9 4/11 4/12 4/15 4/16    4/18 4/19 4/22 4/24 4/25 4/29      5/3 5/6  RE 5/8 5/10 5/13 5/15    5/22                                       PMHx:   has a past medical history of Breast cyst, GERD (gastroesophageal reflux disease), and Macular degeneration.    Precautions: [place]    Manuals HEP 2024                        Ther Ex     Education on HEP and dx  x5min   Bicep curl's/hammer curls  X10 each 4#   Therapist assisted PROM wrist flex/ext & digit IP flexion/ext x x5min   Supination/pronation hammer  X10 3# wts.   Measurements     Flexor stretch powerweb  Red 5 x 10 sec                   Ther Act      Towel scrunch   X10; full fist hold at end   Digi flex  Blue 2x10     Flex bar N's/U's  X10 green   Sponges grasp and release   Lvl 3 gripper   Dice remove with CP  Whole jar with Green clothespin   Custom night time extension splint     Yellow putty pressing and  squeezes, jarciser  X10 presses using 2# dumbbells and x10 jarciser                             Modalities     MHP  x5 min             Assessment: Patient tolerated treatment well with minimal complaints of muscle fatigue requiring minimal rest breaks through out but able to perform TA and TE with increased resistance with good  activity tolerance. Patient exhibited good technique with therapeutic exercises/ activities. Patient would benefit from continued OT to improve overall ability with daily tasks.      Plan:   Focus on ROM and strengthening to improve ability to complete daily activites with ease.  POC 5/6/24 - 7/10/24

## 2024-05-23 ENCOUNTER — OFFICE VISIT (OUTPATIENT)
Dept: PHYSICAL THERAPY | Facility: CLINIC | Age: 74
End: 2024-05-23
Payer: MEDICARE

## 2024-05-23 ENCOUNTER — OFFICE VISIT (OUTPATIENT)
Dept: OCCUPATIONAL THERAPY | Facility: CLINIC | Age: 74
End: 2024-05-23
Payer: MEDICARE

## 2024-05-23 DIAGNOSIS — M25.631 STIFFNESS OF RIGHT WRIST JOINT: ICD-10-CM

## 2024-05-23 DIAGNOSIS — M25.641 JOINT STIFFNESS OF HAND, RIGHT: ICD-10-CM

## 2024-05-23 DIAGNOSIS — Z98.890 S/P RIGHT ROTATOR CUFF REPAIR: Primary | ICD-10-CM

## 2024-05-23 DIAGNOSIS — R20.0 NUMBNESS AND TINGLING IN RIGHT HAND: Primary | ICD-10-CM

## 2024-05-23 DIAGNOSIS — M79.89 SWELLING OF RIGHT HAND: ICD-10-CM

## 2024-05-23 DIAGNOSIS — M75.121 NONTRAUMATIC COMPLETE TEAR OF RIGHT ROTATOR CUFF: ICD-10-CM

## 2024-05-23 DIAGNOSIS — R20.2 NUMBNESS AND TINGLING IN RIGHT HAND: Primary | ICD-10-CM

## 2024-05-23 PROCEDURE — 97112 NEUROMUSCULAR REEDUCATION: CPT

## 2024-05-23 PROCEDURE — 97110 THERAPEUTIC EXERCISES: CPT

## 2024-05-23 PROCEDURE — 97530 THERAPEUTIC ACTIVITIES: CPT

## 2024-05-23 NOTE — PROGRESS NOTES
Daily Note     Today's date: 2024  Patient name: Dominique Varela  : 1950  MRN: [id number]  Referring provider: Karolyn Valdovinos PA-C  Dx:   Encounter Diagnosis     ICD-10-CM    1. S/P right rotator cuff repair  Z98.890                      Subjective: Patient reports her shoulder is feeling good today. Patient reports she has been having some pain in her right wrist the last few days.      Objective: See treatment diary below; Patient was co-treated with JOHN Dozier, under my direct supervision.        Assessment: Tolerated treatment well. Patient  was able to complete lateral raises with decreased shoulder shrugging during today's session. Patient displayed increased shoulder IR range of motion while completing behind the back stretch. Discussed the possibility of discharging to comprehensive [facility] following next weeks session and the patient agreed unless she identifies any physical impairments or  activity limitations.      Plan: Continue per plan of care.         1.  1/15 2.    3.    4.    5.    6.   2/   7.   2/ 8.   2/8 9.   2/14 10.   2/15 RE 11.   2/ 12.   2/   13.   2/ 14.   2/ 15.   3/5 16.   3/7 17.   3/12 18.   3/14 RE   19.   3/19 20.  3/21 21.  3/26 22.  3/28 23.  4/2 24.   4/9   25.   4/11 26.  2024 27.  4/18 RE 28.  4/22 29.  2024   30. 2024     31.  5/6 32.   2024   33.  5/13 34.  5/23 35. 36.         Precautions: s/p R rotator cuff repair w/ biceps tenotomy 24       29 30 31 32 33 34   Manuals 2024   STM/MFR          Joint mobs         Neuromobility         Neuro Re-Ed         Cervical retraction         Rows 2x15 blue TB 2x15 black TB 2x15 12.5# 2x15 12.5# 30x 17.5# 30x 17.5#   Shoulder extensions 2x15 blue TB 2x15  2x15 12.5# 2x15 12.5# 30x 12.5# 30x 12.5#   Shoulder isos          Scaption     2x15 AROM  2x10 2#                        Ther Ex         PROM shoulder          Pendulums         Cane flexion         Cane ER         Flexion AAROM w/ hands clasped 20x5s with ecc lowering  20x5s with ecc lowering  20x5s with eccentric lowering      Cane abd 20x with cane   DC      Pulleys 20 x 5s flex, abd 20x5 flex abd 20x5 flx abd 20x5s flex abd Towel slides flex and abd 20x5s ea. 20x5s flex abd   Supine shld flex AROM         Stand flexion AROM 2x10 2x10 2x10 2x15     Standing abd 2x10 2x10 2x10 2x15 2x15  2x15   Sidelying ER   3x10 2#  2x10 2# 2x10 3#   Ball circles   30x 30x     Wall slides with band    Wall walks          No monies     2x15 rtb  2x15 gtb   Sidelying abd  1lb 1# 2x10    2x10 2# 2x10 2#   Tband IR Blue TB 2x10 Blue Tb 2x10 2.5# 2x10 GTB both handles 2x15 GTB both handles 2x15 GTB both handles 2x15   Tband ER GTB 2x10 Blue 2x10  2.5# 2x10 GTB both handle 2x15 GTB single handle 2x15 GTB single hand 2x15   Behind the back stretch 5x15s 5x15s 5x15s 5x15s     Lateral raises  2lb 2x10  2# 2x10 2# 2x10 2x10 2# 2x10 2#   Face pulls          Shoulder press    #2 2x10  2x10 2# standing 2x10 2#   Ther Activity         Pt education                             Modalities         CP R shoulder                      Access Code: [email or web address]  URL: [email or web address]  Date: 02/01/2024  Prepared by: Irvin Abdul     Exercises  - Seated Scapular Retraction  - 2-3 x daily - 7 x weekly - 2 sets - 10 reps - 5 hold  - Seated Shoulder Shrugs  - 2-3 x daily - 7 x weekly - 2 sets - 10 reps - 5 hold  - Circular Shoulder Pendulum with Table Support  - 2-3 x daily - 7 x weekly - 1 sets - 30 reps  - Standing Elbow Flexion Extension AROM  - 2-3 x daily - 7 x weekly - 2 sets - 10 reps - 5 hold  - Supine Shoulder Flexion with Dowel  - 1 x daily - 7 x weekly - 1 sets - 10 reps - 5 hold  - Supine Shoulder External Rotation with Dowel  - 1 x daily - 7 x weekly - 1 sets - 10 reps - 5 hold

## 2024-05-23 NOTE — PROGRESS NOTES
Daily Note    Today's date: 2024  Patient name: Dominique Varela  : 1950  MRN: [id number]  Referring provider: Karolyn Valdovinos PA-C  Dx:   Encounter Diagnosis     ICD-10-CM    1. Numbness and tingling in right hand  R20.0     R20.2       2. Joint stiffness of hand, right  M25.641       3. Stiffness of right wrist joint  M25.631       4. Swelling of right hand  M79.89                    Subjective: Patient states no pain at start of session.           Objective: See treatment diary below      Auth Tracker- NO AUTH REQ BOMN  Auth Status Total   Visits  Expiration date Co-Insurance   pending                                  Visit Tracker  Date 2/27 2/28 3/5 3/7 3/12 3/14    3/18 3/21 3/25 3/28   4/1 4/2  RE    4/5 4/9 4/11 4/12 4/15 4/16    4/18 4/19 4/22 4/24 4/25 4/29      5/3 5/6  RE 5/8 5/10 5/13 5/15    5/22 5/23                                      PMHx:   has a past medical history of Breast cyst, GERD (gastroesophageal reflux disease), and Macular degeneration.    Precautions: [place]    Manuals HEP 2024                        Ther Ex     Education on HEP and dx  x5min   Bicep curl's/hammer curls  X10 each 4#   Therapist assisted PROM wrist flex/ext & digit IP flexion/ext x x5min   Supination/pronation hammer  X10 3# wts.   Measurements     Flexor stretch powerweb  Red 5 x 10 sec                   Ther Act      Towel scrunch   X10; full fist hold at end   Digi flex  Blue 2x10     Flex bar N's/U's  X10 green   Sponges grasp and release   Lvl 3 gripper   Dice remove with CP  Whole jar with Green clothespin   Custom night time extension splint     Yellow putty pressing and  squeezes, jarciser  X10 presses using 2# dumbbells and x10 jarciser                             Modalities     MHP  x5 min             Assessment: Patient tolerated treatment well with minimal complaints of muscle fatigue requiring minimal rest breaks through out but able to perform TA and TE with increased resistance with  good activity tolerance. Patient exhibited good technique with therapeutic exercises/ activities. Patient would benefit from continued OT to improve overall ability with daily tasks.      Plan:   Focus on ROM and strengthening to improve ability to complete daily activites with ease.  POC 5/6/24 - 7/10/24

## 2024-05-24 ENCOUNTER — OFFICE VISIT (OUTPATIENT)
Dept: OCCUPATIONAL THERAPY | Facility: CLINIC | Age: 74
End: 2024-05-24
Payer: MEDICARE

## 2024-05-24 DIAGNOSIS — R20.2 NUMBNESS AND TINGLING IN RIGHT HAND: Primary | ICD-10-CM

## 2024-05-24 DIAGNOSIS — M25.631 STIFFNESS OF RIGHT WRIST JOINT: ICD-10-CM

## 2024-05-24 DIAGNOSIS — M79.89 SWELLING OF RIGHT HAND: ICD-10-CM

## 2024-05-24 DIAGNOSIS — R20.0 NUMBNESS AND TINGLING IN RIGHT HAND: Primary | ICD-10-CM

## 2024-05-24 DIAGNOSIS — M25.641 JOINT STIFFNESS OF HAND, RIGHT: ICD-10-CM

## 2024-05-24 PROCEDURE — 97530 THERAPEUTIC ACTIVITIES: CPT

## 2024-05-24 PROCEDURE — 97110 THERAPEUTIC EXERCISES: CPT

## 2024-05-24 NOTE — PROGRESS NOTES
Daily Note    Today's date: 2024  Patient name: Dominique Varela  : 1950  MRN: [id number]  Referring provider: Karolyn Valdovinos PA-C  Dx:   Encounter Diagnosis     ICD-10-CM    1. Numbness and tingling in right hand  R20.0     R20.2       2. Joint stiffness of hand, right  M25.641       3. Stiffness of right wrist joint  M25.631       4. Swelling of right hand  M79.89             Start Time: 1015  Stop Time: 1100  Total time in clinic (min): 45 minutes  Subjective: Patient states no pain at start of session.           Objective: See treatment diary below      Auth Tracker- NO AUTH REQ BOMN  Auth Status Total   Visits  Expiration date Co-Insurance   pending                                  Visit Tracker  Date 2/27 2/28 3/5 3/7 3/12 3/14    3/18 3/21 3/25 3/28   4/1 4/2  RE    4/5 4/9 4/11 4/12 4/15 4/16    4/18 4/19 4/22 4/24 4/25 4/29      5/3 5  RE 5/8 5/10 5/13 5/15    5/22 5/23 5/24                                     PMHx:   has a past medical history of Breast cyst, GERD (gastroesophageal reflux disease), and Macular degeneration.    Precautions: [place]    Manuals HEP 2024                        Ther Ex     Education on HEP and dx  x5min   Bicep curl's/hammer curls  X10 each 4#   Therapist assisted PROM wrist flex/ext & digit IP flexion/ext x x5min   Supination/pronation hammer  X10 3# wts.   Measurements     Flexor stretch powerweb  Red 5 x 10 sec                   Ther Act      Towel scrunch   X10; full fist hold at end   Digi flex  Blue 2x10     Flex bar N's/U's  X10 green   Sponges grasp and release   Lvl 3 gripper   Dice remove with CP  Whole jar with Green clothespin   Custom night time extension splint     Yellow putty pressing and  squeezes, jarciser  X10 presses using 2# dumbbells and x10 jarciser                             Modalities     MHP  x5 min             Assessment: Patient tolerated treatment well with minimal complaints of muscle fatigue requiring minimal rest breaks  through out but able to perform TA and TE with increased resistance with good activity tolerance. Patient exhibited good technique with therapeutic exercises/ activities. Patient would benefit from continued OT to improve overall ability with daily tasks.      Plan:   Focus on ROM and strengthening to improve ability to complete daily activites with ease.  POC 5/6/24 - 7/10/24

## 2024-05-28 ENCOUNTER — OFFICE VISIT (OUTPATIENT)
Dept: OCCUPATIONAL THERAPY | Facility: CLINIC | Age: 74
End: 2024-05-28
Payer: MEDICARE

## 2024-05-28 ENCOUNTER — OFFICE VISIT (OUTPATIENT)
Dept: PHYSICAL THERAPY | Facility: CLINIC | Age: 74
End: 2024-05-28
Payer: MEDICARE

## 2024-05-28 DIAGNOSIS — M25.631 STIFFNESS OF RIGHT WRIST JOINT: ICD-10-CM

## 2024-05-28 DIAGNOSIS — R20.0 NUMBNESS AND TINGLING IN RIGHT HAND: Primary | ICD-10-CM

## 2024-05-28 DIAGNOSIS — R20.2 NUMBNESS AND TINGLING IN RIGHT HAND: Primary | ICD-10-CM

## 2024-05-28 DIAGNOSIS — M79.89 SWELLING OF RIGHT HAND: ICD-10-CM

## 2024-05-28 DIAGNOSIS — M75.121 NONTRAUMATIC COMPLETE TEAR OF RIGHT ROTATOR CUFF: ICD-10-CM

## 2024-05-28 DIAGNOSIS — Z98.890 S/P RIGHT ROTATOR CUFF REPAIR: Primary | ICD-10-CM

## 2024-05-28 DIAGNOSIS — M25.641 JOINT STIFFNESS OF HAND, RIGHT: ICD-10-CM

## 2024-05-28 PROCEDURE — 97110 THERAPEUTIC EXERCISES: CPT

## 2024-05-28 PROCEDURE — 97112 NEUROMUSCULAR REEDUCATION: CPT

## 2024-05-28 NOTE — PROGRESS NOTES
Daily Note    Today's date: 2024  Patient name: Dominique Varela  : 1950  MRN: [id number]  Referring provider: Karolyn Valdovinos PA-C  Dx:   Encounter Diagnosis     ICD-10-CM    1. Numbness and tingling in right hand  R20.0     R20.2       2. Joint stiffness of hand, right  M25.641       3. Stiffness of right wrist joint  M25.631       4. Swelling of right hand  M79.89                        Subjective: Patient states no pain at start of session.           Objective: See treatment diary below      Auth Tracker- NO AUTH REQ BOMN  Auth Status Total   Visits  Expiration date Co-Insurance   pending                                  Visit Tracker  Date 2/27 2/28 3/5 3/7 3/12 3/14    3/18 3/21 3/25 3/28   4/1 4/2  RE    4/5 4/9 4/11 4/12 4/15 4/16    4/18 4/19 4/22 4/24 4/25 4/29      5/3 5  RE 5/8 5/10 5/13 5/15    5/22 5/23 5/24 5/28                                    PMHx:   has a past medical history of Breast cyst, GERD (gastroesophageal reflux disease), and Macular degeneration.    Precautions: [place]    Manuals HEP 2024                        Ther Ex     Education on HEP and dx  x5min   Bicep curl's/hammer curls  X10 each 4#   Therapist assisted PROM wrist flex/ext & digit IP flexion/ext x x5min   Supination/pronation hammer  X10 3# wts.   Measurements     Flexor stretch powerweb  Red 5 x 10 sec                   Ther Act      Towel scrunch   X10; full fist hold at end   Digi flex  Blue 2x10     Flex bar N's/U's  X10 green   Sponges grasp and release   Lvl 3 gripper   Dice remove with CP  Whole jar with Green clothespin   Custom night time extension splint     Yellow putty pressing and  squeezes, jarciser  X10 presses using 2# dumbbells and x10 jarciser                             Modalities     MHP  x5 min             Assessment: Patient tolerated treatment well with minimal complaints of muscle fatigue requiring minimal rest breaks through out but able to perform TA and TE with increased  resistance with good activity tolerance. Patient exhibited good technique with therapeutic exercises/ activities. Patient would benefit from continued OT to improve overall ability with daily tasks.      Plan:   Focus on ROM and strengthening to improve ability to complete daily activites with ease.  POC 5/6/24 - 7/10/24

## 2024-05-28 NOTE — PROGRESS NOTES
Daily Note      Today's date: 2024  Patient name: Dominique Varela  : 1950  MRN: [id number]  Referring provider: Karolyn Valdovinos PA-C  Dx:   Encounter Diagnosis     ICD-10-CM    1. S/P right rotator cuff repair  Z98.890       2. Nontraumatic complete tear of right rotator cuff  M75.121           Subjective: Pt reports that her right shoulder is feeling good overall.        Objective: See treatment diary below    Assessment: Pt tolerated treatment well.  Pt demonstrates overall improvement in scapulohumeral mechanics during scaption and abduction with and without weights. Pt introduced to D2 flexion and presents with slight limitation in range and muscle trembling at end range.     Plan: Continue per plan of care.  Plan to discharge to HEP next session.          1.  1/15 2.    3.    4.    5.    6.   2/   7.   2/5 8.   2/8 9.   2/14 10.   2/15 RE 11.   2/ 12.   2/   13.   2/ 14.   2/ 15.   3/5 16.   3/7 17.   3/12 18.   3/14 RE   19.   3/19 20.  3/21 21.  3/26 22.  3/28 23.  4/2 24.   4/9   25.   4/11 26.  2024 27.  4/18 RE 28.  4/22 29.  2024   30. 2024     31.  5/6 32.   2024   33.  5/13 34.  5/23 35.  5/28 36.         Precautions: s/p R rotator cuff repair w/ biceps tenotomy 24       31 32 33 34 35    Manuals 2024    STM/MFR          Joint mobs         Neuromobility         Neuro Re-Ed         Cervical retraction         Rows 2x15 12.5# 2x15 12.5# 30x 17.5# 30x 17.5# 3x10 17.5#    Shoulder extensions 2x15 12.5# 2x15 12.5# 30x 12.5# 30x 12.5# 3x10 12.5#    Shoulder isos          Scaption   2x15 AROM  2x10 2#  2x10 AROM  2x10 2#                         Ther Ex         PROM shoulder         Pendulums         Cane flexion         Cane ER         Flexion AAROM w/ hands clasped 20x5s with eccentric lowering        Cane abd DC        Pulleys 20x5 flx abd 20x5s flex abd Towel slides flex and abd 20x5s ea. 20x5s flex abd 20x5s  flex, abd    Supine shld flex AROM         Stand flexion AROM 2x10 2x15       Standing abd 2x10 2x15 2x15  2x15 2x10     Sidelying ER 3x10 2#  2x10 2# 2x10 3#     Ball circles 30x 30x       Wall slides with band    Wall walks          No monies   2x15 rtb  2x15 gtb     Sidelying abd    2x10 2# 2x10 2#     Tband IR 2.5# 2x10 GTB both handles 2x15 GTB both handles 2x15 GTB both handles 2x15 GTB both handles 2x15    Tband ER 2.5# 2x10 GTB both handle 2x15 GTB single handle 2x15 GTB single hand 2x15 GTB single handle 2x15    Behind the back stretch 5x15s 5x15s       Lateral raises 2# 2x10 2# 2x10 2x10 2# 2x10 2# 2x10 2#    Face pulls          Shoulder press  #2 2x10  2x10 2# standing 2x10 2# 2x10 2#    D2 flexion     2x10 GTB    Ther Activity         Pt education                             Modalities         CP R shoulder                      Access Code: [email or web address]  URL: [email or web address]  Date: 02/01/2024  Prepared by: Irvin Abdul     Exercises  - Seated Scapular Retraction  - 2-3 x daily - 7 x weekly - 2 sets - 10 reps - 5 hold  - Seated Shoulder Shrugs  - 2-3 x daily - 7 x weekly - 2 sets - 10 reps - 5 hold  - Circular Shoulder Pendulum with Table Support  - 2-3 x daily - 7 x weekly - 1 sets - 30 reps  - Standing Elbow Flexion Extension AROM  - 2-3 x daily - 7 x weekly - 2 sets - 10 reps - 5 hold  - Supine Shoulder Flexion with Dowel  - 1 x daily - 7 x weekly - 1 sets - 10 reps - 5 hold  - Supine Shoulder External Rotation with Dowel  - 1 x daily - 7 x weekly - 1 sets - 10 reps - 5 hold

## 2024-05-29 ENCOUNTER — OFFICE VISIT (OUTPATIENT)
Dept: OCCUPATIONAL THERAPY | Facility: CLINIC | Age: 74
End: 2024-05-29
Payer: MEDICARE

## 2024-05-29 ENCOUNTER — OFFICE VISIT (OUTPATIENT)
Dept: PHYSICAL THERAPY | Facility: CLINIC | Age: 74
End: 2024-05-29
Payer: MEDICARE

## 2024-05-29 DIAGNOSIS — Z98.890 S/P RIGHT ROTATOR CUFF REPAIR: Primary | ICD-10-CM

## 2024-05-29 DIAGNOSIS — R20.2 NUMBNESS AND TINGLING IN RIGHT HAND: ICD-10-CM

## 2024-05-29 DIAGNOSIS — M75.121 NONTRAUMATIC COMPLETE TEAR OF RIGHT ROTATOR CUFF: ICD-10-CM

## 2024-05-29 DIAGNOSIS — M25.631 STIFFNESS OF RIGHT WRIST JOINT: ICD-10-CM

## 2024-05-29 DIAGNOSIS — M79.89 SWELLING OF RIGHT HAND: ICD-10-CM

## 2024-05-29 DIAGNOSIS — R20.0 NUMBNESS AND TINGLING IN RIGHT HAND: ICD-10-CM

## 2024-05-29 DIAGNOSIS — M25.641 JOINT STIFFNESS OF HAND, RIGHT: Primary | ICD-10-CM

## 2024-05-29 PROCEDURE — 97110 THERAPEUTIC EXERCISES: CPT

## 2024-05-29 PROCEDURE — 97112 NEUROMUSCULAR REEDUCATION: CPT

## 2024-05-29 PROCEDURE — 97530 THERAPEUTIC ACTIVITIES: CPT

## 2024-05-29 NOTE — PROGRESS NOTES
Daily Note    Today's date: 2024  Patient name: Dominique Varela  : 1950  MRN: [id number]  Referring provider: Karolyn Valdovinos PA-C  Dx:   Encounter Diagnosis     ICD-10-CM    1. Numbness and tingling in right hand  R20.0     R20.2       2. Joint stiffness of hand, right  M25.641       3. Swelling of right hand  M79.89       4. Stiffness of right wrist joint  M25.631             Start Time: 1015  Stop Time: 1100  Total time in clinic (min): 45 minutes      Subjective: Patient states continued stiffness and decreased range of motion in her fingers.         Objective: See treatment diary below      Auth Tracker- NO AUTH REQ BOMN  Auth Status Total   Visits  Expiration date Co-Insurance   pending                                  Visit Tracker  Date 2/27 2/28 3/5 3/7 3/12 3/14    3/18 3/21 3/25 3/28   4/1 4/2  RE    4/5 4/9 4/11 4/12 4/15 4/16    4/18 4/19 4/22 4/24 4/25 4/29      5/3 5  RE 5/8 5/10 5/13 5/15    5/22 5/23 5/24 5/28 5/29                                   PMHx:   has a past medical history of Breast cyst, GERD (gastroesophageal reflux disease), and Macular degeneration.    Precautions: [place]    Manuals HEP 2024                        Ther Ex     Education on HEP and dx  x5min   Bicep curl's/hammer curls  X10 each 5#   Therapist assisted PROM wrist flex/ext & digit IP flexion/ext x x5min   Supination/pronation hammer  X10 3# wts.   Measurements     Flexor stretch powerweb  Red 5 x 10 sec                   Ther Act      Towel scrunch   X10; full fist hold at end   Powerweb squeeze and rotate  Green x10   Flex bar N's/U's  X10 green   Sponges grasp and release   Lvl 3 gripper   Dice remove with CP  Whole jar with Green clothespin   Custom night time extension splint     Yellow putty pressing and  squeezes, jarciser  X10 presses using 2# dumbbells and x10 jarciser                             Modalities     MHP  x5 min             Assessment: Patient tolerated treatment well with  minimal complaints of muscle fatigue requiring minimal rest breaks through out but able to perform TA and TE with increased resistance with good activity tolerance. Patient exhibited good technique with therapeutic exercises/ activities. Patient would benefit from continued OT to improve overall ability with daily tasks.      Plan:   Focus on ROM and strengthening to improve ability to complete daily activites with ease.  POC 5/6/24 - 7/10/24

## 2024-05-29 NOTE — PROGRESS NOTES
Daily Note      Today's date: 2024  Patient name: Dominique Varela  : 1950  MRN: [id number]  Referring provider: Karolyn Valdovinos PA-C  Dx:   Encounter Diagnosis     ICD-10-CM    1. S/P right rotator cuff repair  Z98.890       2. Nontraumatic complete tear of right rotator cuff  M75.121           Subjective: Pt reports that her shoulder feels a little sore after doing lateral raises yesterday. Pt states that she feels ready to continue with HEP on her own.        Objective: See treatment diary below    Assessment: Dominique Varela has been seen in hospital-based outpatient PT for 36 visits s/p right rotator cuff repair with biceps tenotomy, which occurred on 24. Patient demonstrates good progress in short-term and long-term physical therapy goals. Patient presents with the following improvements noted: decreased right shoulder pain, improved right shoulder ROM, and improved right shoulder strength. Patient continues to present with the following impairments: limited right shoulder strength and abnormal scapulohumeral mechanics with fatigue. Due to these impairments, patient continues to have difficulty performing the following: repetitive/prolonged lifting/carrying, lifting overhead, pushing/pulling, and heavy household chores. Patient has been educated in comprehensive HEP and D/C planning. Patient has agreed to be discharged from PT due to achieving independence with comprehensive HEP.     Goals  Short Term Goals [3 weeks; target date: 2/15/24]  1. Patient will be independent with initial HEP. - goal met  2. Patient will demonstrate an increase in right shoulder flexion PROM to 130°. - goal met    Long Term Goals [6 weeks; target date: 4/15/24]  1. Patient will be independent with comprehensive HEP.  - goal met  2. Patient will demonstrate an increase in right shoulder AROM to WNL in order to promote bathing/dressing without pain. - goal met  3. Patient will demonstrate an increase in right shoulder  strength to at least 4/5 on MMT in order to promote pain-free carrying/lifting. - not met  4. Patient will be able to place a 2 lb. weight on a shelf above shoulder height with proper scapulohumeral mechanics. - goal met  5. Patient will be able to perform ADLs with right shoulder pain of 2/10 at worst. - not met    Plan:  Discharge from PT.          1.  1/15 2.   1/17 3.   1/23 4.   1/25 5.   1/30 6.   2/1   7.   2/5 8.   2/8 9.   2/14 10.   2/15 RE 11.   2/20 12.   2/22   13.   2/27 14.   2/28 15.   3/5 16.   3/7 17.   3/12 18.   3/14 RE   19.   3/19 20.  3/21 21.  3/26 22.  3/28 23.  4/2 24.   4/9   25.   4/11 26.  4/16/2024 27.  4/18 RE 28.  4/22 29.  4/24/2024   30. 4/29/2024     31.  5/6 32.   5/8/2024   33.  5/13 34.  5/23 35.  5/28 36.  5/29         Precautions: s/p R rotator cuff repair w/ biceps tenotomy 1/4/24       31 32 33 34 35 36   Manuals 5/6/2024 5/8/2024 5/13/24 5/23/24 5/28/24 5/29/24   STM/MFR          Joint mobs         Neuromobility         Neuro Re-Ed         Cervical retraction         Rows 2x15 12.5# 2x15 12.5# 30x 17.5# 30x 17.5# 3x10 17.5# 3x10 17.5#   Shoulder extensions 2x15 12.5# 2x15 12.5# 30x 12.5# 30x 12.5# 3x10 12.5# 3x10 12.5#   Shoulder isos          Scaption   2x15 AROM  2x10 2#  2x10 AROM  2x10 2#  2x15 AROM  2x10 2#                       Ther Ex         PROM shoulder         Pendulums         Cane flexion         Cane ER         Flexion AAROM w/ hands clasped 20x5s with eccentric lowering        Cane abd DC        Pulleys 20x5 flx abd 20x5s flex abd Towel slides flex and abd 20x5s ea. 20x5s flex abd 20x5s flex, abd Towel slides 20x5s flex, abd   Supine shld flex AROM         Stand flexion AROM 2x10 2x15    2x10    Standing abd 2x10 2x15 2x15  2x15 2x10  2x10    Sidelying ER 3x10 2#  2x10 2# 2x10 3#     Ball circles 30x 30x       Wall slides with band    Wall walks          No monies   2x15 rtb  2x15 gtb     Sidelying abd    2x10 2# 2x10 2#     Tband IR 2.5# 2x10 GTB both  handles 2x15 GTB both handles 2x15 GTB both handles 2x15 GTB both handles 2x15 GTB both handles 2x15   Tband ER 2.5# 2x10 GTB both handle 2x15 GTB single handle 2x15 GTB single hand 2x15 GTB single handle 2x15 GTB single handle 2x15   Behind the back stretch 5x15s 5x15s       Lateral raises 2# 2x10 2# 2x10 2x10 2# 2x10 2# 2x10 2# 2x10 1#   Face pulls          Shoulder press  #2 2x10  2x10 2# standing 2x10 2# 2x10 2# 2x10 1#   D2 flexion     2x10 GTB    Ther Activity         Pt education      Comprehensive HEP                       Modalities         CP R shoulder                         Access Code: [email or web address]  URL: [email or web address]  Date: 05/29/2024  Prepared by: Irvin Abdul    Exercises  - Shoulder Flexion Wall Slide with Towel  - 1 x daily - 7 x weekly - 2 sets - 10 reps - 5 hold  - Standing Shoulder Abduction Slides at Wall  - 1 x daily - 7 x weekly - 2 sets - 10 reps - 5 hold  - Standing Shoulder Scaption  - 1 x daily - 7 x weekly - 2 sets - 15 reps  - Shoulder Abduction - Thumbs Up  - 1 x daily - 7 x weekly - 2 sets - 15 reps  - Standing Row with Anchored Resistance  - 1 x daily - 7 x weekly - 3 sets - 10 reps  - Shoulder Extension with Resistance  - 1 x daily - 7 x weekly - 3 sets - 10 reps  - Shoulder Internal Rotation with Resistance  - 1 x daily - 7 x weekly - 3 sets - 10 reps  - Shoulder External Rotation with Anchored Resistance  - 1 x daily - 7 x weekly - 2 sets - 10 reps  - Scaption with Dumbbells  - 1 x daily - 7 x weekly - 3 sets - 10 reps  - Shoulder Abduction with Dumbbells - Thumbs Up  - 1 x daily - 7 x weekly - 3 sets - 10 reps  - Shoulder Overhead Press in Flexion with Dumbbells  - 1 x daily - 7 x weekly - 3 sets - 10 reps  - Doorway Pec Stretch at 60 Elevation  - 1 x daily - 7 x weekly - 1 sets - 5 reps - 15 hold

## 2024-06-03 ENCOUNTER — OFFICE VISIT (OUTPATIENT)
Dept: OCCUPATIONAL THERAPY | Facility: CLINIC | Age: 74
End: 2024-06-03
Payer: MEDICARE

## 2024-06-03 DIAGNOSIS — R20.0 NUMBNESS AND TINGLING IN RIGHT HAND: Primary | ICD-10-CM

## 2024-06-03 DIAGNOSIS — M25.631 STIFFNESS OF RIGHT WRIST JOINT: ICD-10-CM

## 2024-06-03 DIAGNOSIS — M79.89 SWELLING OF RIGHT HAND: ICD-10-CM

## 2024-06-03 DIAGNOSIS — M25.641 JOINT STIFFNESS OF HAND, RIGHT: ICD-10-CM

## 2024-06-03 DIAGNOSIS — R20.2 NUMBNESS AND TINGLING IN RIGHT HAND: Primary | ICD-10-CM

## 2024-06-03 PROCEDURE — 97530 THERAPEUTIC ACTIVITIES: CPT

## 2024-06-03 PROCEDURE — 97110 THERAPEUTIC EXERCISES: CPT

## 2024-06-03 NOTE — PROGRESS NOTES
Daily Note    Today's date: 6/3/2024  Patient name: Dominique Varela  : 1950  MRN: [id number]  Referring provider: Karolyn Valdovinos PA-C  Dx:   Encounter Diagnosis     ICD-10-CM    1. Numbness and tingling in right hand  R20.0     R20.2       2. Stiffness of right wrist joint  M25.631       3. Swelling of right hand  M79.89       4. Joint stiffness of hand, right  M25.641                      Subjective: Patient states she was doing a lot over the weekend and was able to make a fist more easily.         Objective: See treatment diary below      Auth Tracker- NO AUTH REQ BOMN  Auth Status Total   Visits  Expiration date Co-Insurance   pending                                  Visit Tracker  Date 2/27 2/28 3/5 3/7 3/12 3/14    3/18 3/21 3/25 3/28   4/1 4/2  RE    4/5 4/9 4/11 4/12 4/15 4/16    4/18 4/19 4/22 4/24 4/25 4/29      5/3 56  RE 5/8 5/10 5/13 5/15    5/22 5/23 5/24 5/28 5/29 6/3                                  PMHx:   has a past medical history of Breast cyst, GERD (gastroesophageal reflux disease), and Macular degeneration.    Precautions: [place]    Manuals HEP 6/3/2024                        Ther Ex     Education on HEP and dx  x5min   Bicep curl's/hammer curls  X10 each 5#   Therapist assisted PROM wrist flex/ext & digit IP flexion/ext x x5min   Supination/pronation hammer  X10 3# wts.   Measurements     Flexor stretch powerweb  Red 5 x 10 sec                   Ther Act      Towel scrunch   X10; full fist hold at end   Powerweb squeeze and rotate  Green x10   Flex bar N's/U's  X10 green   Sponges grasp and release   Lvl 3 gripper   Dice remove with CP  Whole jar with Green clothespin   Custom night time extension splint     Yellow putty pressing and  squeezes, jarciser  X10 presses using 2# dumbbells and x10 jarciser                             Modalities     MHP  x5 min             Assessment: Patient tolerated treatment well with minimal complaints of muscle fatigue requiring minimal rest  breaks through out but able to perform TA and TE with increased resistance with good activity tolerance. Patient exhibited good technique with therapeutic exercises/ activities. Patient would benefit from continued OT to improve overall ability with daily tasks.      Plan:   Focus on ROM and strengthening to improve ability to complete daily activites with ease.  POC 5/6/24 - 7/10/24

## 2024-06-05 ENCOUNTER — OFFICE VISIT (OUTPATIENT)
Dept: OCCUPATIONAL THERAPY | Facility: CLINIC | Age: 74
End: 2024-06-05
Payer: MEDICARE

## 2024-06-05 DIAGNOSIS — R20.2 NUMBNESS AND TINGLING IN RIGHT HAND: Primary | ICD-10-CM

## 2024-06-05 DIAGNOSIS — R20.0 NUMBNESS AND TINGLING IN RIGHT HAND: Primary | ICD-10-CM

## 2024-06-05 DIAGNOSIS — M25.641 JOINT STIFFNESS OF HAND, RIGHT: ICD-10-CM

## 2024-06-05 DIAGNOSIS — M79.89 SWELLING OF RIGHT HAND: ICD-10-CM

## 2024-06-05 DIAGNOSIS — M25.631 STIFFNESS OF RIGHT WRIST JOINT: ICD-10-CM

## 2024-06-05 PROCEDURE — 97110 THERAPEUTIC EXERCISES: CPT

## 2024-06-05 PROCEDURE — 97530 THERAPEUTIC ACTIVITIES: CPT

## 2024-06-05 NOTE — PROGRESS NOTES
Daily Note    Today's date: 2024  Patient name: Dominique Varela  : 1950  MRN: [id number]  Referring provider: Karolyn Valdovinos PA-C  Dx:   Encounter Diagnosis     ICD-10-CM    1. Numbness and tingling in right hand  R20.0     R20.2       2. Stiffness of right wrist joint  M25.631       3. Swelling of right hand  M79.89       4. Joint stiffness of hand, right  M25.641                      Subjective: Patient offers no functional changes.         Objective: See treatment diary below      Auth Tracker- NO AUTH REQ BOMN  Auth Status Total   Visits  Expiration date Co-Insurance   pending                                  Visit Tracker  Date 2/27 2/28 3/5 3/7 3/12 3/14    3/18 3/21 3/25 3/28   4/1 4/2  RE    4/5 4/9 4/11 4/12 4/15 4/16    4/18 4/19 4/22 4/24 4/25 4/29      5/3 5  RE 5/8 5/10 5/13 5/15    5/22 5/23 5/24 5/28 5/29 6/3    6                              PMHx:   has a past medical history of Breast cyst, GERD (gastroesophageal reflux disease), and Macular degeneration.    Precautions: [place]    Manuals HEP 2024                        Ther Ex     Education on HEP and dx  x5min   Bicep curl's/hammer curls  X10 each 5#   Therapist assisted PROM wrist flex/ext & digit IP flexion/ext x x5min   Supination/pronation hammer  X10 3# wts.   Measurements     Flexor stretch powerweb  Red 5 x 10 sec                   Ther Act      Towel scrunch   X10; full fist hold at end   Powerweb squeeze and rotate  Green x10   Flex bar N's/U's  X10 green   Sponges grasp and release   Lvl 3 gripper   Dice remove with CP  Whole jar with Green clothespin   Custom night time extension splint     Yellow putty pressing and  squeezes, jarciser  X10 presses using 2# dumbbells and x10 jarciser                             Modalities     MHP  x5 min             Assessment: Patient tolerated treatment well with minimal complaints of muscle fatigue requiring minimal rest breaks through out but able to perform TA and TE with  increased resistance with good activity tolerance. Patient exhibited good technique with therapeutic exercises/ activities. Patient would benefit from continued OT to improve overall ability with daily tasks.      Plan:   Focus on ROM and strengthening to improve ability to complete daily activites with ease.  POC 5/6/24 - 7/10/24

## 2024-06-10 ENCOUNTER — OFFICE VISIT (OUTPATIENT)
Dept: OCCUPATIONAL THERAPY | Facility: CLINIC | Age: 74
End: 2024-06-10
Payer: MEDICARE

## 2024-06-10 DIAGNOSIS — M25.631 STIFFNESS OF RIGHT WRIST JOINT: Primary | ICD-10-CM

## 2024-06-10 DIAGNOSIS — R20.0 NUMBNESS AND TINGLING IN RIGHT HAND: ICD-10-CM

## 2024-06-10 DIAGNOSIS — R20.2 NUMBNESS AND TINGLING IN RIGHT HAND: ICD-10-CM

## 2024-06-10 DIAGNOSIS — M25.641 JOINT STIFFNESS OF HAND, RIGHT: ICD-10-CM

## 2024-06-10 DIAGNOSIS — M79.89 SWELLING OF RIGHT HAND: ICD-10-CM

## 2024-06-10 PROCEDURE — 97530 THERAPEUTIC ACTIVITIES: CPT

## 2024-06-10 PROCEDURE — 97110 THERAPEUTIC EXERCISES: CPT

## 2024-06-10 NOTE — PROGRESS NOTES
Daily Note    Today's date: 6/10/2024  Patient name: Dominique Varela  : 1950  MRN: [id number]  Referring provider: Karolyn Valdovinos PA-C  Dx:   Encounter Diagnosis     ICD-10-CM    1. Stiffness of right wrist joint  M25.631       2. Numbness and tingling in right hand  R20.0     R20.2       3. Swelling of right hand  M79.89       4. Joint stiffness of hand, right  M25.641                        Subjective: Patient offers no functional changes.         Objective: See treatment diary below      Auth Tracker- NO AUTH REQ BOMN  Auth Status Total   Visits  Expiration date Co-Insurance   pending                                  Visit Tracker  Date 2/27 2/28 3/5 3/7 3/12 3/14    3/18 3/21 3/25 3/28   4/1 4/2  RE    4/5 4/9 4/11 4/12 4/15 4/16    4/18 4/19 4/22 4/24 4/25 4/29      5/3 5  RE 5/8 5/10 5/13 5/15    5/22 5/23 5/24 5/28 5/29 6/3    6/5 6/10                             PMHx:   has a past medical history of Breast cyst, GERD (gastroesophageal reflux disease), and Macular degeneration.    Precautions: [place]    Manuals HEP 6/10/2024                        Ther Ex     Education on HEP and dx  x5min   Bicep curl's/hammer curls  X10 each 5#   Therapist assisted PROM wrist flex/ext & digit IP flexion/ext x x5min   Supination/pronation hammer  X10 3# wts.   Measurements     Flexor stretch powerweb  Red 5 x 10 sec                   Ther Act      Towel scrunch   X10; full fist hold at end   Powerweb squeeze and rotate  Green x10   Flex bar N's/U's  X10 green   Sponges grasp and release   Lvl 3 gripper   Dice remove with CP  Whole jar with Green clothespin   Custom night time extension splint     Yellow putty pressing and  squeezes, jarciser  X10 presses using 2# dumbbells and x10 jarciser                             Modalities     MHP  x5 min             Assessment: Patient tolerated treatment well with minimal complaints of muscle fatigue requiring minimal rest breaks through out but able to perform TA and  TE with good activity tolerance. Patient exhibited good technique with therapeutic exercises/ activities. Patient would benefit from continued OT to improve overall ability with daily tasks.      Plan:   Focus on ROM and strengthening to improve ability to complete daily activites with ease.  POC 5/6/24 - 7/10/24

## 2024-06-11 ENCOUNTER — OFFICE VISIT (OUTPATIENT)
Dept: OCCUPATIONAL THERAPY | Facility: CLINIC | Age: 74
End: 2024-06-11
Payer: MEDICARE

## 2024-06-11 DIAGNOSIS — M79.89 SWELLING OF RIGHT HAND: ICD-10-CM

## 2024-06-11 DIAGNOSIS — M25.631 STIFFNESS OF RIGHT WRIST JOINT: Primary | ICD-10-CM

## 2024-06-11 DIAGNOSIS — M25.641 JOINT STIFFNESS OF HAND, RIGHT: ICD-10-CM

## 2024-06-11 DIAGNOSIS — R20.0 NUMBNESS AND TINGLING IN RIGHT HAND: ICD-10-CM

## 2024-06-11 DIAGNOSIS — R20.2 NUMBNESS AND TINGLING IN RIGHT HAND: ICD-10-CM

## 2024-06-11 PROCEDURE — 97110 THERAPEUTIC EXERCISES: CPT

## 2024-06-11 PROCEDURE — 97530 THERAPEUTIC ACTIVITIES: CPT

## 2024-06-11 NOTE — PROGRESS NOTES
Daily Note    Today's date: 2024  Patient name: Dominique Varela  : 1950  MRN: [id number]  Referring provider: Karolyn Valdovinos PA-C  Dx:   Encounter Diagnosis     ICD-10-CM    1. Stiffness of right wrist joint  M25.631       2. Numbness and tingling in right hand  R20.0     R20.2       3. Swelling of right hand  M79.89       4. Joint stiffness of hand, right  M25.641                  Subjective: Patient offers no functional changes.         Objective: See treatment diary below      Auth Tracker- NO AUTH REQ BOMN  Auth Status Total   Visits  Expiration date Co-Insurance   pending                                  Visit Tracker  Date 2/27 2/28 3/5 3/7 3/12 3/14    3/18 3/21 3/25 3/28   4/1 4/2  RE    4/5 4/9 4/11 4/12 4/15 4/16    4/18 4/19 4/22 4/24 4/25 4/29      5/3 56  RE 5/8 5/10 5/13 5/15    5/22 5/23 5/24 5/28 5/29 6/3    6 6/10 6/11                            PMHx:   has a past medical history of Breast cyst, GERD (gastroesophageal reflux disease), and Macular degeneration.    Precautions: [place]    Manuals HEP 2024                        Ther Ex     Education on HEP and dx  x5min   Bicep curl's/hammer curls  X10 each 5#   Therapist assisted PROM wrist flex/ext & digit IP flexion/ext x x5min   Pronator  X10 #1    Measurements     Flexor stretch powerweb  Red 5 x 10 sec                   Ther Act      Towel scrunch   X10; full fist hold at end   Powerweb squeeze and rotate  Green x10   Flex bar N's/U's  X10 green   Sponges grasp and release   Lvl 3 gripper   Dice remove with CP  Whole jar with Green clothespin   Custom night time extension splint     Red putty jarciser and finding marbles  X10 jarciser, x5 marbles, x20 pinches                            Modalities     MHP  x5 min             Assessment: Patient tolerated treatment well with minimal complaints of muscle fatigue requiring minimal rest breaks through out but able to perform TA and TE with good activity tolerance. Patient  exhibited good technique with therapeutic exercises/ activities. Patient would benefit from continued OT to improve overall ability with daily tasks.      Plan:   Focus on ROM and strengthening to improve ability to complete daily activites with ease.  POC 5/6/24 - 7/10/24

## 2024-06-12 ENCOUNTER — APPOINTMENT (OUTPATIENT)
Dept: OCCUPATIONAL THERAPY | Facility: CLINIC | Age: 74
End: 2024-06-12
Payer: MEDICARE

## 2024-06-18 ENCOUNTER — OFFICE VISIT (OUTPATIENT)
Dept: OCCUPATIONAL THERAPY | Facility: CLINIC | Age: 74
End: 2024-06-18
Payer: MEDICARE

## 2024-06-18 DIAGNOSIS — M25.631 STIFFNESS OF RIGHT WRIST JOINT: Primary | ICD-10-CM

## 2024-06-18 DIAGNOSIS — R20.2 NUMBNESS AND TINGLING IN RIGHT HAND: ICD-10-CM

## 2024-06-18 DIAGNOSIS — M79.89 SWELLING OF RIGHT HAND: ICD-10-CM

## 2024-06-18 DIAGNOSIS — R20.0 NUMBNESS AND TINGLING IN RIGHT HAND: ICD-10-CM

## 2024-06-18 DIAGNOSIS — M25.641 JOINT STIFFNESS OF HAND, RIGHT: ICD-10-CM

## 2024-06-18 PROCEDURE — 97530 THERAPEUTIC ACTIVITIES: CPT

## 2024-06-18 PROCEDURE — 97110 THERAPEUTIC EXERCISES: CPT

## 2024-06-18 NOTE — PROGRESS NOTES
Daily Note    Today's date: 2024  Patient name: Dominique Varela  : 1950  MRN: [id number]  Referring provider: Karolyn Valdovinos PA-C  Dx:   Encounter Diagnosis     ICD-10-CM    1. Stiffness of right wrist joint  M25.631       2. Numbness and tingling in right hand  R20.0     R20.2       3. Swelling of right hand  M79.89       4. Joint stiffness of hand, right  M25.641                    Subjective: Patient offers no functional changes.         Objective: See treatment diary below      Auth Tracker- NO AUTH REQ BOMN  Auth Status Total   Visits  Expiration date Co-Insurance   pending                                  Visit Tracker  Date 2/27 2/28 3/5 3/7 3/12 3/14    3/18 3/21 3/25 3/28   4/1 4/2  RE    4/5 4/9 4/11 4/12 4/15 4/16    4/18 4/19 4/22 4/24 4/25 4/29      5/3 56  RE 5/8 5/10 5/13 5/15    5/22 5/23 5/24 5/28 5/29 6/3    6 6/10 6/11 6/18                           PMHx:   has a past medical history of Breast cyst, GERD (gastroesophageal reflux disease), and Macular degeneration.    Precautions: [place]    Manuals HEP 2024                        Ther Ex     Education on HEP and dx  x5min   Bicep curl's/hammer curls  X10 each 5#   Therapist assisted PROM wrist flex/ext & digit IP flexion/ext x x5min   Pronator  X10 #1    Measurements     Flexor stretch powerweb  Red 5 x 10 sec                   Ther Act      Towel scrunch   X10; full fist hold at end   Powerweb squeeze and rotate  Green x10   Flex bar N's/U's  X10 green   Sponges grasp and release   Lvl 3 gripper   Dice remove with CP  Whole jar with blue clothespin   Red putty jarciser and finding marbles  X10 jarciser, x5 marbles, x20 pinches                            Modalities     MHP  x5 min             Assessment: Patient tolerated treatment well with minimal complaints of muscle fatigue requiring minimal rest breaks through out but able to perform TA and TE with good activity tolerance. Patient exhibited good technique with  therapeutic exercises/ activities. Patient would benefit from continued OT to improve overall ability with daily tasks.      Plan:   Focus on ROM and strengthening to improve ability to complete daily activites with ease.  POC 5/6/24 - 7/10/24

## 2024-06-21 ENCOUNTER — OFFICE VISIT (OUTPATIENT)
Dept: OCCUPATIONAL THERAPY | Facility: CLINIC | Age: 74
End: 2024-06-21
Payer: MEDICARE

## 2024-06-21 DIAGNOSIS — M25.641 JOINT STIFFNESS OF HAND, RIGHT: ICD-10-CM

## 2024-06-21 DIAGNOSIS — R20.0 NUMBNESS AND TINGLING IN RIGHT HAND: ICD-10-CM

## 2024-06-21 DIAGNOSIS — R20.2 NUMBNESS AND TINGLING IN RIGHT HAND: ICD-10-CM

## 2024-06-21 DIAGNOSIS — M79.89 SWELLING OF RIGHT HAND: ICD-10-CM

## 2024-06-21 DIAGNOSIS — M25.631 STIFFNESS OF RIGHT WRIST JOINT: Primary | ICD-10-CM

## 2024-06-21 PROCEDURE — 97110 THERAPEUTIC EXERCISES: CPT

## 2024-06-21 PROCEDURE — 97530 THERAPEUTIC ACTIVITIES: CPT

## 2024-06-21 NOTE — PROGRESS NOTES
Daily Note    Today's date: 2024  Patient name: Dominique Varela  : 1950  MRN: [id number]  Referring provider: Karolyn Valdovinos PA-C  Dx:   Encounter Diagnosis     ICD-10-CM    1. Stiffness of right wrist joint  M25.631       2. Numbness and tingling in right hand  R20.0     R20.2       3. Swelling of right hand  M79.89       4. Joint stiffness of hand, right  M25.641                      Subjective: Patient offers no functional changes.         Objective: See treatment diary below      Auth Tracker- NO AUTH REQ BOMN  Auth Status Total   Visits  Expiration date Co-Insurance   pending                                  Visit Tracker  Date 2/27 2/28 3/5 3/7 3/12 3/14    3/18 3/21 3/25 3/28   4/1 4/2  RE    4/5 4/9 4/11 4/12 4/15 4/16    4/18 4/19 4/22 4/24 4/25 4/29      5/3 5/6  RE 5/8 5/10 5/13 5/15    5/22 5/23 5/24 5/28 5/29 6/3    6 6/10 6/11 6/18 6/21                          PMHx:   has a past medical history of Breast cyst, GERD (gastroesophageal reflux disease), and Macular degeneration.    Precautions: [place]    Manuals HEP 2024                        Ther Ex     Education on HEP and dx  x5min   Bicep curl's/hammer curls  X10 each 5#   Therapist assisted PROM wrist flex/ext & digit IP flexion/ext x x5min   Pronator  X10 #1    Measurements     Flexor stretch powerweb  Red 5 x 10 sec                   Ther Act      Towel scrunch   X10; full fist hold at end   Powerweb squeeze and rotate  Green x10   Flex bar N's/U's  X10 green   Sponges grasp and release   Lvl 3 gripper   Dice remove with CP  Whole jar with blue clothespin   Red putty jarciser and finding marbles  X10 jarciser, x5 marbles, x20 pinches                            Modalities     MHP  x5 min             Assessment: Patient tolerated treatment well with minimal complaints of muscle fatigue requiring minimal rest breaks through out but able to perform TA and TE with good activity tolerance. Patient exhibited good technique with  therapeutic exercises/ activities. Patient would benefit from continued OT to improve overall ability with daily tasks.      Plan:   Focus on ROM and strengthening to improve ability to complete daily activites with ease.  POC 5/6/24 - 7/10/24

## 2024-06-25 ENCOUNTER — OFFICE VISIT (OUTPATIENT)
Dept: OCCUPATIONAL THERAPY | Facility: CLINIC | Age: 74
End: 2024-06-25
Payer: MEDICARE

## 2024-06-25 DIAGNOSIS — M25.631 STIFFNESS OF RIGHT WRIST JOINT: Primary | ICD-10-CM

## 2024-06-25 DIAGNOSIS — M25.641 JOINT STIFFNESS OF HAND, RIGHT: ICD-10-CM

## 2024-06-25 DIAGNOSIS — M79.89 SWELLING OF RIGHT HAND: ICD-10-CM

## 2024-06-25 DIAGNOSIS — R20.2 NUMBNESS AND TINGLING IN RIGHT HAND: ICD-10-CM

## 2024-06-25 DIAGNOSIS — R20.0 NUMBNESS AND TINGLING IN RIGHT HAND: ICD-10-CM

## 2024-06-25 PROCEDURE — 97530 THERAPEUTIC ACTIVITIES: CPT

## 2024-06-25 PROCEDURE — 97110 THERAPEUTIC EXERCISES: CPT

## 2024-06-25 NOTE — PROGRESS NOTES
Daily Note    Today's date: 2024  Patient name: Dominique Varela  : 1950  MRN: [id number]  Referring provider: Karolyn Valdovinos PA-C  Dx:   Encounter Diagnosis     ICD-10-CM    1. Stiffness of right wrist joint  M25.631       2. Numbness and tingling in right hand  R20.0     R20.2       3. Swelling of right hand  M79.89       4. Joint stiffness of hand, right  M25.641                        Subjective: Patient offers no functional changes.         Objective: See treatment diary below      Auth Tracker- NO AUTH REQ BOMN  Auth Status Total   Visits  Expiration date Co-Insurance   pending                                  Visit Tracker  Date 2/27 2/28 3/5 3/7 3/12 3/14    3/18 3/21 3/25 3/28   4/1 4/2  RE    4/5 4/9 4/11 4/12 4/15 4/16    4/18 4/19 4/22 4/24 4/25 4/29      5/3 5/6  RE 5/8 5/10 5/13 5/15    5/22 5/23 5/24 5/28 5/29 6/3    6 6/10 6/11 6/18 6/21 6/25                         PMHx:   has a past medical history of Breast cyst, GERD (gastroesophageal reflux disease), and Macular degeneration.    Precautions: [place]    Manuals HEP 2024                        Ther Ex     Education on HEP and dx  x5min   Bicep curl's/hammer curls  X10 each 5#   Therapist assisted PROM wrist flex/ext & digit IP flexion/ext x x5min   Pronator  X10 #1    Measurements     Flexor stretch powerweb  Red 5 x 10 sec                   Ther Act      Towel scrunch   X10; full fist hold at end   Powerweb squeeze and rotate  Green x10   Flex bar N's/U's  X10 green   Sponges grasp and release   Lvl 3 gripper   Dice remove with CP  Whole jar with blue clothespin   Red putty jarciser and finding marbles  X10 jarciser, x5 marbles, x20 pinches                            Modalities     MHP  x5 min             Assessment: Patient tolerated treatment well with minimal complaints of muscle fatigue requiring minimal rest breaks through out but able to perform TA and TE with good activity tolerance. Patient exhibited good technique  with therapeutic exercises/ activities. Patient would benefit from continued OT to improve overall ability with daily tasks.      Plan:   Focus on ROM and strengthening to improve ability to complete daily activites with ease.  POC 5/6/24 - 7/10/24

## 2024-06-28 ENCOUNTER — OFFICE VISIT (OUTPATIENT)
Dept: OCCUPATIONAL THERAPY | Facility: CLINIC | Age: 74
End: 2024-06-28
Payer: MEDICARE

## 2024-06-28 DIAGNOSIS — M25.631 STIFFNESS OF RIGHT WRIST JOINT: Primary | ICD-10-CM

## 2024-06-28 DIAGNOSIS — R20.2 NUMBNESS AND TINGLING IN RIGHT HAND: ICD-10-CM

## 2024-06-28 DIAGNOSIS — R20.0 NUMBNESS AND TINGLING IN RIGHT HAND: ICD-10-CM

## 2024-06-28 DIAGNOSIS — M25.641 JOINT STIFFNESS OF HAND, RIGHT: ICD-10-CM

## 2024-06-28 DIAGNOSIS — M79.89 SWELLING OF RIGHT HAND: ICD-10-CM

## 2024-06-28 PROCEDURE — 97110 THERAPEUTIC EXERCISES: CPT

## 2024-06-28 PROCEDURE — 97530 THERAPEUTIC ACTIVITIES: CPT

## 2024-06-28 NOTE — PROGRESS NOTES
Daily Note    Today's date: 2024  Patient name: Dominique Varela  : 1950  MRN: [id number]  Referring provider: Karolyn Valdovinos PA-C  Dx:   Encounter Diagnosis     ICD-10-CM    1. Stiffness of right wrist joint  M25.631       2. Numbness and tingling in right hand  R20.0     R20.2       3. Swelling of right hand  M79.89       4. Joint stiffness of hand, right  M25.641                          Subjective: Patient offers no functional changes.         Objective: See treatment diary below      Auth Tracker- NO AUTH REQ BOMN  Auth Status Total   Visits  Expiration date Co-Insurance   pending                                  Visit Tracker  Date 2/27 2/28 3/5 3/7 3/12 3/14    3/18 3/21 3/25 3/28   4/1 4/2  RE    4/5 4/9 4/11 4/12 4/15 4/16    4/18 4/19 4/22 4/24 4/25 4/29      5/3 5/6  RE 5/8 5/10 5/13 5/15    5/22 5/23 5/24 5/28 5/29 6/3    6 6/10 6/11 6/18 6/21 6/25    6/28                     PMHx:   has a past medical history of Breast cyst, GERD (gastroesophageal reflux disease), and Macular degeneration.    Precautions: [place]    Manuals HEP 2024                        Ther Ex     Education on HEP and dx  x5min   Bicep curl's/hammer curls  X10 each 5#   Therapist assisted PROM wrist flex/ext & digit IP flexion/ext x x5min   Pronator  X10 #1    Measurements     Flexor stretch powerweb  Red 5 x 10 sec                   Ther Act      Towel scrunch   X10; full fist hold at end   Powerweb squeeze and rotate  Green x10   Flex bar N's/U's  X10 green   Sponges grasp and release   Lvl 3 gripper   Dice remove with CP  Whole jar with blue clothespin   Red putty jarciser and finding marbles  X10 jarciser, x5 marbles, x20 pinches                            Modalities     MHP  x5 min             Assessment: Patient tolerated treatment well with minimal complaints of muscle fatigue requiring minimal rest breaks through out but able to perform TA and TE with good activity tolerance. Patient exhibited good  technique with therapeutic exercises/ activities. Patient would benefit from continued OT to improve overall ability with daily tasks.      Plan:   Focus on ROM and strengthening to improve ability to complete daily activites with ease.  POC 5/6/24 - 7/10/24

## 2024-07-01 ENCOUNTER — OFFICE VISIT (OUTPATIENT)
Dept: OBGYN CLINIC | Facility: CLINIC | Age: 74
End: 2024-07-01
Payer: MEDICARE

## 2024-07-01 VITALS
WEIGHT: 227 LBS | HEIGHT: 64 IN | BODY MASS INDEX: 38.76 KG/M2 | SYSTOLIC BLOOD PRESSURE: 133 MMHG | DIASTOLIC BLOOD PRESSURE: 84 MMHG | HEART RATE: 67 BPM

## 2024-07-01 DIAGNOSIS — Z98.890 S/P RIGHT ROTATOR CUFF REPAIR: Primary | ICD-10-CM

## 2024-07-01 DIAGNOSIS — M25.641 JOINT STIFFNESS OF HAND, RIGHT: ICD-10-CM

## 2024-07-01 DIAGNOSIS — G56.03 CARPAL TUNNEL SYNDROME ON BOTH SIDES: ICD-10-CM

## 2024-07-01 PROCEDURE — 99213 OFFICE O/P EST LOW 20 MIN: CPT | Performed by: ORTHOPAEDIC SURGERY

## 2024-07-01 NOTE — PROGRESS NOTES
Assessment/Plan:  1. S/P right rotator cuff repair        2. Carpal tunnel syndrome on both sides        3. Joint stiffness of hand, right          Scribe Attestation      I,:  Nixon Pettit am acting as a scribe while in the presence of the attending physician.:       I,:  Everette Keys MD personally performed the services described in this documentation    as scribed in my presence.:               Dominique is doing very well.  Her physical therapy reports were reviewed.  I am pleased with her right shoulder range of motion and strength.  She has recovered well and she will continue to gain strength over the next 6 months.  I encouraged her to continue with her home exercise program.  Her right long finger stiffness is improving as well.  She is able to make a composite fist today.  She can continue with therapy for her right hand.  Joint he can follow-up with me as needed.    Subjective:   Dominique Varela is a 74 y.o. female who presents today for 6-month follow-up appointment of the right shoulder rotator cuff repair.  Dominique states she is doing very well.  She has no complaints of excessive pain.  She completed physical therapy for her right shoulder at the end of May and has continued with a home exercise program.  She is very pleased with her progress.  Joint states that her carpal tunnel syndrome has been improving.  She did find some relief from her steroid injection given to her by Dr. Lopez.  She has been attending physical therapy on a regular basis and notes improvement in the stiffness that was previously in her right long finger.      Review of Systems   Constitutional:  Negative for chills, fever and unexpected weight change.   HENT:  Negative for hearing loss, nosebleeds and sore throat.    Eyes:  Negative for pain, redness and visual disturbance.   Respiratory:  Negative for cough, shortness of breath and wheezing.    Cardiovascular:  Negative for chest pain, palpitations and leg swelling.    Gastrointestinal:  Negative for abdominal pain, nausea and vomiting.   Endocrine: Negative for polydipsia and polyuria.   Genitourinary:  Negative for dysuria and hematuria.   Musculoskeletal:         See HPI   Skin:  Negative for rash and wound.   Neurological:  Negative for dizziness, numbness and headaches.   Psychiatric/Behavioral:  Negative for decreased concentration and suicidal ideas. The patient is not nervous/anxious.          Past Medical History:   Diagnosis Date    Breast cyst     GERD (gastroesophageal reflux disease)     tums only    Macular degeneration     left eye       Past Surgical History:   Procedure Laterality Date    BREAST CYST EXCISION Right 1971    2 rt breast cysts removed     SECTION      and     CHOLECYSTECTOMY      COLONOSCOPY  2016    [facility]ROS[facility] SHOULDER LMTD DBRDMT  Right 2024    Procedure: Right shoulder arthroscopic rotator cuff repair, biceps tenotomy, Extensive Debridement;  Surgeon: Everette Keys MD;  Location: [facility] OR;  Service: Orthopedics       Family History   Problem Relation Age of Onset    Heart disease Father     Breast cancer Other         unkown age    No Known Problems Sister     No Known Problems Daughter     No Known Problems Maternal Grandmother     No Known Problems Paternal Grandmother     No Known Problems Maternal Aunt     No Known Problems Maternal Aunt     No Known Problems Paternal Aunt        Social History     Occupational History    Not on file   Tobacco Use    Smoking status: Never     Passive exposure: Never    Smokeless tobacco: Never   Vaping Use    Vaping status: Never Used   Substance and Sexual Activity    Alcohol use: No    Drug use: No    Sexual activity: Not on file         Current Outpatient Medications:     Coenzyme Q10 (CoQ10) 50 MG CAPS, Take by mouth, Disp: , Rfl:     Lutein 40 MG CAPS, Take by mouth, Disp: , Rfl:     Multiple Vitamins-Minerals (MULTIVITAMIN WITH MINERALS) tablet,  Take 1 tablet by mouth daily, Disp: , Rfl:     acetaminophen (TYLENOL) 500 mg tablet, Take 2 tablets (1,000 mg total) by mouth every 8 (eight) hours (Patient not taking: Reported on 7/1/2024), Disp: 60 tablet, Rfl: 0    capsicum (ZOSTRIX) 0.075 % topical cream, Apply topically 3 (three) times a day as needed (for nerve pain) (Patient not taking: Reported on 5/1/2024), Disp: 120 g, Rfl: 0    glucosamine-chondroitin 500-400 MG tablet, Take 1 tablet by mouth 3 (three) times a day (Patient not taking: Reported on 1/15/2024), Disp: , Rfl:     naproxen (Naprosyn) 500 mg tablet, Take 1 tablet (500 mg total) by mouth 2 (two) times a day with meals (Patient not taking: Reported on 1/15/2024), Disp: 20 tablet, Rfl: 0    ondansetron (ZOFRAN) 4 mg tablet, Take 1 tablet (4 mg total) by mouth every 8 (eight) hours as needed for nausea or vomiting (Patient not taking: Reported on 1/15/2024), Disp: 20 tablet, Rfl: 0    tolterodine (DETROL LA) 2 mg 24 hr capsule, Take 2 mg by mouth daily (Patient not taking: Reported on 5/1/2024), Disp: , Rfl:     Allergies   Allergen Reactions    Atorvastatin Myalgia       Objective:  Vitals:    07/01/24 1024   BP: 133/84   Pulse: 67       Right Shoulder Exam     Range of Motion   Active abduction:  160   External rotation:  80   Forward flexion:  170   Internal rotation 0 degrees:  L1     Muscle Strength   Abduction: 5/5   Internal rotation: 5/5   External rotation: 5/5   Supraspinatus: 5/5   Subscapularis: 5/5   Biceps: 5/5     Tests   Impingement: negative  Drop arm: negative    Other   Erythema: absent  Scars: present  Sensation: normal  Pulse: present (2+ radial)            Physical Exam  Vitals reviewed.   Constitutional:       Appearance: She is well-developed.   HENT:      Head: Normocephalic and atraumatic.   Eyes:      General:         Right eye: No discharge.         Left eye: No discharge.      Conjunctiva/sclera: Conjunctivae normal.   Cardiovascular:      Rate and Rhythm: Regular  rhythm.   Pulmonary:      Effort: Pulmonary effort is normal. No respiratory distress.      Breath sounds: No stridor.   Musculoskeletal:      Cervical back: Normal range of motion and neck supple.      Comments: As noted in the HPI.   Skin:     General: Skin is warm and dry.   Neurological:      Mental Status: She is alert and oriented to person, place, and time.   Psychiatric:         Behavior: Behavior normal.               This document was created using speech voice recognition software.   Grammatical errors, random word insertions, pronoun errors, and incomplete sentences are an occasional consequence of this system due to software limitations, ambient noise, and hardware issues.   Any formal questions or concerns about content, text, or information contained within the body of this dictation should be directly addressed to the provider for clarification.

## 2024-07-03 ENCOUNTER — OFFICE VISIT (OUTPATIENT)
Dept: OCCUPATIONAL THERAPY | Facility: CLINIC | Age: 74
End: 2024-07-03
Payer: MEDICARE

## 2024-07-03 DIAGNOSIS — R20.2 NUMBNESS AND TINGLING IN RIGHT HAND: ICD-10-CM

## 2024-07-03 DIAGNOSIS — M25.641 JOINT STIFFNESS OF HAND, RIGHT: ICD-10-CM

## 2024-07-03 DIAGNOSIS — R20.0 NUMBNESS AND TINGLING IN RIGHT HAND: ICD-10-CM

## 2024-07-03 DIAGNOSIS — M79.89 SWELLING OF RIGHT HAND: ICD-10-CM

## 2024-07-03 DIAGNOSIS — M25.631 STIFFNESS OF RIGHT WRIST JOINT: Primary | ICD-10-CM

## 2024-07-03 PROCEDURE — 97110 THERAPEUTIC EXERCISES: CPT

## 2024-07-03 PROCEDURE — 97530 THERAPEUTIC ACTIVITIES: CPT

## 2024-07-03 NOTE — PROGRESS NOTES
Re-Evaluation Note    Today's date: 7/3/2024  Patient name: Dominique Varela  : 1950  MRN: [id number]  Referring provider: Karolyn Valdovinos PA-C  Dx:   Encounter Diagnosis     ICD-10-CM    1. Stiffness of right wrist joint  M25.631       2. Numbness and tingling in right hand  R20.0     R20.2       3. Swelling of right hand  M79.89       4. Joint stiffness of hand, right  M25.641                    Subjective: Patient offers no functional changes, she had a follow up with physician in which he was pleased with her progress and would like her to continue with hand therapy to decrease stiffness. Patient has been trying to use her hand normally but steroid injection did help decrease stiffness.        Objective: See treatment diary below      Active Range of Motion     Left Wrist   Wrist flexion: 60 degrees   Wrist extension: 65 degrees     Right Wrist   Wrist flexion: 62 degrees   Wrist extension: 62 degrees     Left Thumb   Kapandji score: 10 degrees      Right Thumb   Kapandji score: 10 degrees      Strength/Myotome Testing     Left Wrist/Hand    (2nd hand position)     Trial 1: 55    Right Wrist/Hand    (2nd hand position)     Trial 1: 40 (+5)      Goals  Short term goals 2-4 weeks  Establish HEP to enhance performance with ADLs.   MET    Improve active range of motion of RUE by 20  to assist with UE dressing. MET    Achieve composite digital flexion to touch distal montes crease for grasp on utensils. MET    In crease  strength by 10 lbs. to enhance gripping hand tools. MET        Updated STG:  Patient will increase  strength by at least 5-10 lbs to increase aid in picking up heavier objects during household chores.      Long Term goals by discharge  Establish final home exercise program to enhance maximal functional level with ADLs.    Achieve functional active range of motion of RUE for full return to household chores. MET      Achieve functional strength of RUE for full return to high level  ADLs.              Auth Tracker- NO AUTH REQ BOMN  Auth Status Total   Visits  Expiration date Co-Insurance   pending                                  Visit Tracker  Date 2/27 2/28 3/5 3/7 3/12 3/14    3/18 3/21 3/25 3/28   4/1 4/2  RE    4/5 4/9 4/11 4/12 4/15 4/16    4/18 4/19 4/22 4/24 4/25 4/29      5/3 5/6  RE 5/8 5/10 5/13 5/15    5/22 5/23 5/24 5/28 5/29 6/3    6/5 6/10 6/11 6/18 6/21 6/25    6/28 7/3  RE                    PMHx:   has a past medical history of Breast cyst, GERD (gastroesophageal reflux disease), and Macular degeneration.    Precautions: [place]    Manuals HEP 7/3/2024                        Ther Ex     Education on HEP and dx  x5min   Bicep curl's/hammer curls  X10 each 5#   Therapist assisted PROM wrist flex/ext & digit IP flexion/ext x x5min   Pronator  X10 #1    Measurements  x   Flexor stretch powerweb  Red 5 x 10 sec    WB on red ball  5 x 10 sec             Ther Act      Towel scrunch   X10; full fist hold at end   Flex bar N's/U's  X10 green   Sponges grasp and release   Lvl 4 gripper   Red putty jarciser and finding marbles  X10 jarciser                            Modalities     MHP  x5 min             Assessment: Patient re-evaluation done this this date with noted improvement in AROM. Patient strength also assessed this date with noted deficits in R hand  strength compared to contralateral side at this time. Patient would benefit from continued skilled OT to maximize ROM and strength at this time to improve functional use with ADLs.  Patient tolerated treatment well with minimal complaints of muscle fatigue requiring minimal rest breaks through out but able to perform TA and TE with good activity tolerance. Patient exhibited good technique with therapeutic exercises/ activities.       Plan:   Focus on ROM and strengthening to improve ability to complete daily activites with ease.  POC 7/3/24 - 8/28/24

## 2024-07-05 ENCOUNTER — APPOINTMENT (OUTPATIENT)
Dept: OCCUPATIONAL THERAPY | Facility: CLINIC | Age: 74
End: 2024-07-05
Payer: MEDICARE

## 2024-07-09 ENCOUNTER — OFFICE VISIT (OUTPATIENT)
Dept: OCCUPATIONAL THERAPY | Facility: CLINIC | Age: 74
End: 2024-07-09
Payer: MEDICARE

## 2024-07-09 DIAGNOSIS — M79.89 SWELLING OF RIGHT HAND: ICD-10-CM

## 2024-07-09 DIAGNOSIS — M25.631 STIFFNESS OF RIGHT WRIST JOINT: Primary | ICD-10-CM

## 2024-07-09 DIAGNOSIS — R20.2 NUMBNESS AND TINGLING IN RIGHT HAND: ICD-10-CM

## 2024-07-09 DIAGNOSIS — R20.0 NUMBNESS AND TINGLING IN RIGHT HAND: ICD-10-CM

## 2024-07-09 DIAGNOSIS — M25.641 JOINT STIFFNESS OF HAND, RIGHT: ICD-10-CM

## 2024-07-09 PROCEDURE — 97110 THERAPEUTIC EXERCISES: CPT

## 2024-07-09 PROCEDURE — 97530 THERAPEUTIC ACTIVITIES: CPT

## 2024-07-09 NOTE — PROGRESS NOTES
Daily Note    Today's date: 2024  Patient name: Dominique Varela  : 1950  MRN: [id number]  Referring provider: Karolyn Valdovinos PA-C  Dx:   Encounter Diagnosis     ICD-10-CM    1. Stiffness of right wrist joint  M25.631       2. Swelling of right hand  M79.89       3. Joint stiffness of hand, right  M25.641       4. Numbness and tingling in right hand  R20.0     R20.2                      Subjective: Patient offers no functional changes.    Objective: See treatment diary below      Auth Tracker- NO AUTH REQ BOMN  Auth Status Total   Visits  Expiration date Co-Insurance   pending                                  Visit Tracker  Date 2/27 2/28 3/5 3/7 3/12 3/14    3/18 3/21 3/25 3/28   4/1 4/2  RE    4/5 4/9 4/11 4/12 4/15 4/16    4/18 4/19 4/22 4/24 4/25 4/29      5/3 5/6  RE 5/8 5/10 5/13 5/15    5/22 5/23 5/24 5/28 5/29 6/3    6/ 6/10 6 7/3  RE                    PMHx:   has a past medical history of Breast cyst, GERD (gastroesophageal reflux disease), and Macular degeneration.    Precautions: [place]    Manuals HEP 2024                        Ther Ex     Education on HEP and dx  x5min   Bicep curl's/hammer curls  X10 each 6#   Therapist assisted PROM wrist flex/ext & digit IP flexion/ext x x5min   Pronator  X10 #1    Measurements     Flexor stretch powerweb  Black 5 x 10 sec    WB on red ball  5 x 10 sec   Stretch on wedge with dumbbell  10 sec hold with 4#        Ther Act      Towel scrunch   X10; full fist hold at end   Flex bar N's/U's  X10 green   Sponges grasp and release   Lvl 4 gripper   Red putty jarciser and finding marbles  X10 jarciser                            Modalities     MHP  x5 min             Assessment:  Patient tolerated treatment well with minimal complaints of muscle fatigue requiring minimal rest breaks through out but able to perform TA and TE with good activity tolerance. Patient exhibited good technique with therapeutic exercises/ activities.  Patient would benefit from continued skilled OT to maximize ROM and strength at this time to improve functional use with ADLs.       Plan:   Focus on ROM and strengthening to improve ability to complete daily activites with ease.  POC 7/3/24 - 8/28/24

## 2024-07-11 ENCOUNTER — OFFICE VISIT (OUTPATIENT)
Dept: OCCUPATIONAL THERAPY | Facility: CLINIC | Age: 74
End: 2024-07-11
Payer: MEDICARE

## 2024-07-11 DIAGNOSIS — M25.631 STIFFNESS OF RIGHT WRIST JOINT: Primary | ICD-10-CM

## 2024-07-11 DIAGNOSIS — R20.2 NUMBNESS AND TINGLING IN RIGHT HAND: ICD-10-CM

## 2024-07-11 DIAGNOSIS — M79.89 SWELLING OF RIGHT HAND: ICD-10-CM

## 2024-07-11 DIAGNOSIS — R20.0 NUMBNESS AND TINGLING IN RIGHT HAND: ICD-10-CM

## 2024-07-11 DIAGNOSIS — M25.641 JOINT STIFFNESS OF HAND, RIGHT: ICD-10-CM

## 2024-07-11 PROCEDURE — 97110 THERAPEUTIC EXERCISES: CPT

## 2024-07-11 PROCEDURE — 97530 THERAPEUTIC ACTIVITIES: CPT

## 2024-07-11 NOTE — PROGRESS NOTES
Daily Note    Today's date: 2024  Patient name: Dominique Varela  : 1950  MRN: [id number]  Referring provider: Karolyn Valdovinos PA-C  Dx:   Encounter Diagnosis     ICD-10-CM    1. Stiffness of right wrist joint  M25.631       2. Swelling of right hand  M79.89       3. Joint stiffness of hand, right  M25.641       4. Numbness and tingling in right hand  R20.0     R20.2                    Subjective: Patient offers no functional changes.      Objective: See treatment diary below      Auth Tracker- NO AUTH REQ BOMN  Auth Status Total   Visits  Expiration date Co-Insurance   pending                                  Visit Tracker  Date 2/27 2/28 3/5 3/7 3/12 3/14    3/18 3/21 3/25 3/28   4/1 4/2  RE    4/5 4/9 4/11 4/12 4/15 4/16    4/18 4/19 4/22 4/24 4/25 4/29      5/3 5/6  RE 5/8 5/10 5/13 5/15    5/22 5/23 5/24 5/28 5/29 6/3    6/ 6/10 6/11 6/18 6/21 6/25    6/28 7/3  RE                   PMHx:   has a past medical history of Breast cyst, GERD (gastroesophageal reflux disease), and Macular degeneration.    Precautions: [place]    Manuals HEP 2024                        Ther Ex     Education on HEP and dx  x5min   Bicep curl's/hammer curls  X10 each 6#   Therapist assisted PROM wrist flex/ext & digit IP flexion/ext x x5min   Pronator  X10 #1    Measurements     Flexor stretch powerweb  Black 5 x 10 sec    WB on red ball  5 x 10 sec   Stretch on wedge with dumbbell  10 sec hold with 4#   Digiflex  X10 Blue        Ther Act      Towel scrunch   X10; full fist hold at end   Flex bar N's/U's  X10 green   Sponges grasp and release   Lvl 4 gripper   Red putty jarciser   X10 jarciser                            Modalities     MHP  x5 min             Assessment:  Patient tolerated treatment well with minimal complaints of muscle fatigue requiring minimal rest breaks through out but able to perform TA and TE with good activity tolerance. Patient exhibited good technique with therapeutic exercises/  activities. Patient would benefit from continued skilled OT to maximize ROM and strength at this time to improve functional use with ADLs.       Plan:   Focus on ROM and strengthening to improve ability to complete daily activites with ease.  POC 7/3/24 - 8/28/24

## 2024-07-16 ENCOUNTER — OFFICE VISIT (OUTPATIENT)
Dept: OCCUPATIONAL THERAPY | Facility: CLINIC | Age: 74
End: 2024-07-16
Payer: MEDICARE

## 2024-07-16 ENCOUNTER — HOSPITAL ENCOUNTER (OUTPATIENT)
Dept: RADIOLOGY | Facility: HOSPITAL | Age: 74
Discharge: HOME | End: 2024-07-16
Payer: MEDICARE

## 2024-07-16 VITALS — HEIGHT: 64 IN | BODY MASS INDEX: 37.22 KG/M2 | WEIGHT: 218 LBS

## 2024-07-16 DIAGNOSIS — R20.0 NUMBNESS AND TINGLING IN RIGHT HAND: ICD-10-CM

## 2024-07-16 DIAGNOSIS — R20.2 NUMBNESS AND TINGLING IN RIGHT HAND: ICD-10-CM

## 2024-07-16 DIAGNOSIS — M79.89 SWELLING OF RIGHT HAND: ICD-10-CM

## 2024-07-16 DIAGNOSIS — Z12.31 ENCOUNTER FOR SCREENING MAMMOGRAM FOR MALIGNANT NEOPLASM OF BREAST: ICD-10-CM

## 2024-07-16 DIAGNOSIS — M25.641 JOINT STIFFNESS OF HAND, RIGHT: ICD-10-CM

## 2024-07-16 DIAGNOSIS — M25.631 STIFFNESS OF RIGHT WRIST JOINT: Primary | ICD-10-CM

## 2024-07-16 PROCEDURE — 97110 THERAPEUTIC EXERCISES: CPT

## 2024-07-16 PROCEDURE — 77063 BREAST TOMOSYNTHESIS BI: CPT

## 2024-07-16 PROCEDURE — 77067 SCR MAMMO BI INCL CAD: CPT

## 2024-07-16 NOTE — PROGRESS NOTES
Daily Note    Today's date: 2024  Patient name: Dominique Varela  : 1950  MRN: [id number]  Referring provider: Karolyn Valdovinos PA-C  Dx:   Encounter Diagnosis     ICD-10-CM    1. Stiffness of right wrist joint  M25.631       2. Swelling of right hand  M79.89       3. Joint stiffness of hand, right  M25.641       4. Numbness and tingling in right hand  R20.0     R20.2                      Subjective: Patient offers no functional changes.      Objective: See treatment diary below      Auth Tracker- NO AUTH REQ BOMN  Auth Status Total   Visits  Expiration date Co-Insurance   pending                                  Visit Tracker  Date 2/27 2/28 3/5 3/7 3/12 3/14    3/18 3/21 3/25 3/28   4/1 4/2  RE    4/5 4/9 4/11 4/12 4/15 4/16    4/18 4/19 4/22 4/24 4/25 4/29      5/3 5/6  RE 5/8 5/10 5/13 5/15    5/22 5/23 5/24 5/28 5/29 6/3    6/ 6/10 6 7/3  RE                  PMHx:   has a past medical history of Breast cyst, GERD (gastroesophageal reflux disease), and Macular degeneration.    Precautions: [place]    Manuals HEP 2024                        Ther Ex     Education on HEP and dx  x5min   Bicep curl's/hammer curls  X10 each 6#   Therapist assisted PROM wrist flex/ext & digit IP flexion/ext x x5min   Pronator  X10 #1    Measurements     Flexor stretch powerweb  Black 5 x 10 sec    WB on red ball  5 x 10 sec   Stretch on wedge with dumbbell  10 sec hold with 4#   Digiflex  X10 Blue        Ther Act      Towel scrunch   X10; full fist hold at end   Flex bar N's/U's  X10 green   Sponges grasp and release   Lvl 4 gripper   Red putty jarciser   X10 jarciser                            Modalities     MHP  x5 min             Assessment:  Patient tolerated treatment well and able to perform TA and TE with good activity tolerance. Patient exhibited good technique with therapeutic exercises/ activities. Patient would benefit from continued skilled OT to maximize ROM and  strength at this time to improve functional use with ADLs.       Plan:   Focus on ROM and strengthening to improve ability to complete daily activites with ease.  POC 7/3/24 - 8/28/24

## 2024-07-18 ENCOUNTER — OFFICE VISIT (OUTPATIENT)
Dept: OCCUPATIONAL THERAPY | Facility: CLINIC | Age: 74
End: 2024-07-18
Payer: MEDICARE

## 2024-07-18 DIAGNOSIS — R20.2 NUMBNESS AND TINGLING IN RIGHT HAND: ICD-10-CM

## 2024-07-18 DIAGNOSIS — M25.631 STIFFNESS OF RIGHT WRIST JOINT: Primary | ICD-10-CM

## 2024-07-18 DIAGNOSIS — R20.0 NUMBNESS AND TINGLING IN RIGHT HAND: ICD-10-CM

## 2024-07-18 DIAGNOSIS — M25.641 JOINT STIFFNESS OF HAND, RIGHT: ICD-10-CM

## 2024-07-18 DIAGNOSIS — M79.89 SWELLING OF RIGHT HAND: ICD-10-CM

## 2024-07-18 PROCEDURE — 97110 THERAPEUTIC EXERCISES: CPT

## 2024-07-18 PROCEDURE — 97530 THERAPEUTIC ACTIVITIES: CPT

## 2024-07-18 NOTE — PROGRESS NOTES
Daily Note    Today's date: 2024  Patient name: Dominique Varela  : 1950  MRN: [id number]  Referring provider: Karolyn Valdovinos PA-C  Dx:   Encounter Diagnosis     ICD-10-CM    1. Stiffness of right wrist joint  M25.631       2. Swelling of right hand  M79.89       3. Joint stiffness of hand, right  M25.641       4. Numbness and tingling in right hand  R20.0     R20.2                    Subjective: Patient states her middle finger is still stiff and when she bends it it does hurt abit but it's tolerable.      Objective: See treatment diary below      Auth Tracker- NO AUTH REQ BOMN  Auth Status Total   Visits  Expiration date Co-Insurance   pending                                  Visit Tracker  Date 2/27 2/28 3/5 3/7 3/12 3/14    3/18 3/21 3/25 3/28   4/1 4  RE    4/5 4/9 4/11 4/12 4/15 4/16    4/18 4/19 4/22 4/24 4/25 4/29      5/3 5/6  RE 5/8 5/10 5/13 5/15    5/22 5/23 5/24 5/28 5/29 6/3    6/ 6/10 6/11 6/18 6/21 6/25    6/28 7/3  RE                                                                       PMHx:   has a past medical history of Breast cyst, GERD (gastroesophageal reflux disease), and Macular degeneration.    Precautions: [place]    Manuals HEP 2024                        Ther Ex     Education on HEP and dx  x5min   Bicep curl's/hammer curls  X10 each 6#   Therapist assisted PROM wrist flex/ext & digit IP flexion/ext x x5min   Pronator  X10 #1    Measurements     Flexor stretch powerweb  Black 5 x 10 sec    WB on red ball  5 x 10 sec   Stretch on wedge with dumbbell  10 sec hold with 4#   Digiflex  X10 Blue        Ther Act      Towel scrunch   X10; full fist hold at end   Flex bar N's/U's  X10 green   Sponges grasp and release   Lvl 4 gripper   Red putty jarciser   X10 jarciser, x20 pinches                            Modalities     MHP  x5 min             Assessment:  Patient tolerated treatment well and able to perform TA and TE with good activity tolerance.  Patient exhibited good technique with therapeutic exercises/ activities. Patient would benefit from continued skilled OT to maximize ROM and strength at this time to improve functional use with ADLs.       Plan:   Focus on ROM and strengthening to improve ability to complete daily activites with ease.  POC 7/3/24 - 8/28/24

## 2024-07-23 ENCOUNTER — APPOINTMENT (OUTPATIENT)
Dept: OCCUPATIONAL THERAPY | Facility: CLINIC | Age: 74
End: 2024-07-23
Payer: MEDICARE

## 2024-07-25 ENCOUNTER — OFFICE VISIT (OUTPATIENT)
Dept: OCCUPATIONAL THERAPY | Facility: CLINIC | Age: 74
End: 2024-07-25
Payer: MEDICARE

## 2024-07-25 DIAGNOSIS — M25.631 STIFFNESS OF RIGHT WRIST JOINT: Primary | ICD-10-CM

## 2024-07-25 DIAGNOSIS — M79.89 SWELLING OF RIGHT HAND: ICD-10-CM

## 2024-07-25 DIAGNOSIS — M25.641 JOINT STIFFNESS OF HAND, RIGHT: ICD-10-CM

## 2024-07-25 DIAGNOSIS — R20.2 NUMBNESS AND TINGLING IN RIGHT HAND: ICD-10-CM

## 2024-07-25 DIAGNOSIS — R20.0 NUMBNESS AND TINGLING IN RIGHT HAND: ICD-10-CM

## 2024-07-25 PROCEDURE — 97110 THERAPEUTIC EXERCISES: CPT

## 2024-07-25 PROCEDURE — 97530 THERAPEUTIC ACTIVITIES: CPT

## 2024-07-25 NOTE — PROGRESS NOTES
Daily Note    Today's date: 2024  Patient name: Dominique Varela  : 1950  MRN: [id number]  Referring provider: Karolyn Valdovinos PA-C  Dx:   Encounter Diagnosis     ICD-10-CM    1. Stiffness of right wrist joint  M25.631       2. Swelling of right hand  M79.89       3. Joint stiffness of hand, right  M25.641       4. Numbness and tingling in right hand  R20.0     R20.2             Start Time: 1015  Stop Time: 1100  Total time in clinic (min): 45 minutes  Subjective: Patient reports she has continued tightness in her right middle digit.       Objective: See treatment diary below      Auth Tracker- NO AUTH REQ BOMN  Auth Status Total   Visits  Expiration date Co-Insurance   pending                                  Visit Tracker  Date 2/27 2/28 3/5 3/7 3/12 3/14    3/18 3/21 3/25 3/28   4/1 4  RE    4/5 4/9 4/11 4/12 4/15 4/16    4/18 4/19 4/22 4/24 4/25 4/29      5/3 5/6  RE 5/8 5/10 5/13 5/15    5/22 5/23 5/24 5/28 5/29 6/3    6/ 6/10 6/11 6/18 6/21 6/25    6/28 7/3  RE                                                                       PMHx:   has a past medical history of Breast cyst, GERD (gastroesophageal reflux disease), and Macular degeneration.    Precautions: [place]    Manuals HEP 2024                        Ther Ex     Education on HEP and dx  x5min   Bicep curl's/hammer curls  X10 each 6#   Therapist assisted PROM wrist flex/ext & digit IP flexion/ext x x5min   Pronator  X10 #1    Measurements     Flexor stretch powerweb  Black 5 x 10 sec    WB on red ball     Stretch on wedge with dumbbell  10 sec hold with 4#   Digiflex  X10 Blue        Ther Act      Towel scrunch   X10; full fist hold at end   Flex bar N's/U's  X10 green   Sponges grasp and release   Lvl 4 gripper   Red putty jarciser   X10 jarciser, x20 pinches    dice 1 at a time 3 total  Full container   Dice with blue clothes pin  Full container                  Modalities     MHP  x5 min              Assessment:  Patient tolerated treatment well and able to perform TA and TE with good activity tolerance. Patient exhibited good technique with therapeutic exercises/ activities. Patient would benefit from continued skilled OT to maximize ROM and strength at this time to improve functional use with ADLs.       Plan:   Focus on ROM and strengthening to improve ability to complete daily activites with ease.  POC 7/3/24 - 8/28/24

## 2024-08-01 ENCOUNTER — OFFICE VISIT (OUTPATIENT)
Dept: OCCUPATIONAL THERAPY | Facility: CLINIC | Age: 74
End: 2024-08-01
Payer: MEDICARE

## 2024-08-01 DIAGNOSIS — M79.89 SWELLING OF RIGHT HAND: ICD-10-CM

## 2024-08-01 DIAGNOSIS — R20.2 NUMBNESS AND TINGLING IN RIGHT HAND: ICD-10-CM

## 2024-08-01 DIAGNOSIS — R20.0 NUMBNESS AND TINGLING IN RIGHT HAND: ICD-10-CM

## 2024-08-01 DIAGNOSIS — M25.641 JOINT STIFFNESS OF HAND, RIGHT: ICD-10-CM

## 2024-08-01 DIAGNOSIS — M25.631 STIFFNESS OF RIGHT WRIST JOINT: Primary | ICD-10-CM

## 2024-08-01 PROCEDURE — 97530 THERAPEUTIC ACTIVITIES: CPT

## 2024-08-01 PROCEDURE — 97110 THERAPEUTIC EXERCISES: CPT

## 2024-08-01 NOTE — PROGRESS NOTES
Daily Note    Today's date: 2024  Patient name: Dominique Varela  : 1950  MRN: [id number]  Referring provider: Karolyn Valdovinos PA-C  Dx:   Encounter Diagnosis     ICD-10-CM    1. Stiffness of right wrist joint  M25.631       2. Swelling of right hand  M79.89       3. Joint stiffness of hand, right  M25.641       4. Numbness and tingling in right hand  R20.0     R20.2               Start Time: 1015  Stop Time: 1100  Total time in clinic (min): 45 minutes  Subjective: Patient reports she has continued tightness in her right middle digit.       Objective: See treatment diary below      Auth Tracker- NO AUTH REQ BOMN  Auth Status Total   Visits  Expiration date Co-Insurance   pending                                  Visit Tracker  Date 2/27 2/28 3/5 3/7 3/12 3/14    3/18 3/21 3/25 3/28   4/1 4  RE    4/5 4/9 4/11 4/12 4/15 4/16    4/18 4/19 4/22 4/24 4/25 4/29      5/3 5/6  RE  5/10 5/13 5/15    5/22 5/23 5/24 5/28 5/29 6/3    6/ 6/10 6/ 7/3  RE     8                                                                  PMHx:   has a past medical history of Breast cyst, GERD (gastroesophageal reflux disease), and Macular degeneration.    Precautions: [place]    Manuals HEP 2024                        Ther Ex     Education on HEP and dx  x5min   Bicep curl's/hammer curls  X10 each 6#   Therapist assisted PROM wrist flex/ext & digit IP flexion/ext x x5min   Pronator  X10 #1    Measurements     Flexor stretch powerweb  Black 5 x 10 sec    WB on red ball     Stretch on wedge with dumbbell  10 sec hold with 4#   Digiflex  X10 Blue        Ther Act      Towel scrunch   X10; full fist hold at end   Flex bar N's/U's  X10 green   Sponges grasp and release   Lvl 4 gripper   Red putty jarciser   X10 jarciser, x20 pinches   Dice with blue clothes pin  Full container                  Modalities     MHP  x5 min             Assessment:  Patient tolerated treatment well and  able to perform TA and TE with good activity tolerance. Patient exhibited good technique with therapeutic exercises/ activities. Patient would benefit from continued skilled OT to maximize ROM and strength at this time to improve functional use with ADLs.       Plan:   Focus on ROM and strengthening to improve ability to complete daily activites with ease.  POC 7/3/24 - 8/28/24

## 2024-08-05 ENCOUNTER — OFFICE VISIT (OUTPATIENT)
Dept: OCCUPATIONAL THERAPY | Facility: CLINIC | Age: 74
End: 2024-08-05
Payer: MEDICARE

## 2024-08-05 DIAGNOSIS — M25.631 STIFFNESS OF RIGHT WRIST JOINT: Primary | ICD-10-CM

## 2024-08-05 DIAGNOSIS — R20.2 NUMBNESS AND TINGLING IN RIGHT HAND: ICD-10-CM

## 2024-08-05 DIAGNOSIS — M79.89 SWELLING OF RIGHT HAND: ICD-10-CM

## 2024-08-05 DIAGNOSIS — R20.0 NUMBNESS AND TINGLING IN RIGHT HAND: ICD-10-CM

## 2024-08-05 DIAGNOSIS — M25.641 JOINT STIFFNESS OF HAND, RIGHT: ICD-10-CM

## 2024-08-05 PROCEDURE — 97530 THERAPEUTIC ACTIVITIES: CPT

## 2024-08-05 PROCEDURE — 97110 THERAPEUTIC EXERCISES: CPT

## 2024-08-05 NOTE — PROGRESS NOTES
Daily Note    Today's date: 2024  Patient name: oDminique Varela  : 1950  MRN: [id number]  Referring provider: Karolyn Valdovinos PA-C  Dx:   Encounter Diagnosis     ICD-10-CM    1. Stiffness of right wrist joint  M25.631       2. Swelling of right hand  M79.89       3. Joint stiffness of hand, right  M25.641       4. Numbness and tingling in right hand  R20.0     R20.2                    Subjective: Patient reports she has continued stiffness in her RUE.      Objective: See treatment diary below      Auth Tracker- NO AUTH REQ BOMN  Auth Status Total   Visits  Expiration date Co-Insurance   pending                                  Visit Tracker  Date 2/27 2/28 3/5 3/7 3/12 3/14    3/18 3/21 3/25 3/28   4/1 4/2  RE    4/5 4/9 4/11 4/12 4/15 4/16    4/18 4/19 4/22 4/24 4/25 4/29      5/3 56  RE 5/8 5/10 5/13 5/15    5/22 5/23 5/24 5/28 5/29 6/3    6 6/10 6/11 6/18 6/21 6/25    6/28 7/3  RE                                                                  PMHx:   has a past medical history of Breast cyst, GERD (gastroesophageal reflux disease), and Macular degeneration.    Precautions: [place]    Manuals HEP 2024                        Ther Ex     Education on HEP and dx  x5min   Bicep curl's/hammer curls  X10 each 6#   Therapist assisted PROM wrist flex/ext & digit IP flexion/ext x x5min   Pronator  X10 #1    Measurements     Flexor stretch powerweb  Black 5 x 10 sec    WB on red ball  5 x 10 sec   Stretch on wedge with dumbbell  10 sec hold with 4#   Digiflex  X10 Blue        Ther Act      Towel scrunch   X10; full fist hold at end   Flex bar N's/U's  X10 green   Sponges grasp and release   Lvl 4 gripper   Red putty jarciser   X10 jarciser, x20 pinches, x5  squeezes                  Modalities     MHP  x5 min             Assessment:  Patient tolerated treatment well and able to perform TA and TE with good activity tolerance. Patient exhibited good technique with  therapeutic exercises/ activities. Patient would benefit from continued skilled OT to maximize ROM and strength at this time to improve functional use with ADLs.       Plan:   Focus on ROM and strengthening to improve ability to complete daily activites with ease.  POC 7/3/24 - 8/28/24

## 2024-08-12 ENCOUNTER — APPOINTMENT (OUTPATIENT)
Dept: OCCUPATIONAL THERAPY | Facility: CLINIC | Age: 74
End: 2024-08-12
Payer: MEDICARE

## 2024-08-14 ENCOUNTER — OFFICE VISIT (OUTPATIENT)
Dept: OCCUPATIONAL THERAPY | Facility: CLINIC | Age: 74
End: 2024-08-14
Payer: MEDICARE

## 2024-08-14 DIAGNOSIS — M25.641 JOINT STIFFNESS OF HAND, RIGHT: ICD-10-CM

## 2024-08-14 DIAGNOSIS — M79.89 SWELLING OF RIGHT HAND: ICD-10-CM

## 2024-08-14 DIAGNOSIS — M25.631 STIFFNESS OF RIGHT WRIST JOINT: Primary | ICD-10-CM

## 2024-08-14 PROCEDURE — 97110 THERAPEUTIC EXERCISES: CPT

## 2024-08-14 PROCEDURE — 97530 THERAPEUTIC ACTIVITIES: CPT

## 2024-08-14 NOTE — PROGRESS NOTES
S Daily Note    Today's date: 2024  Patient name: Dominique Varela  : 1950  MRN: [id number]  Referring provider: Karolyn Valdovinos PA-C  Dx:   Encounter Diagnosis     ICD-10-CM    1. Stiffness of right wrist joint  M25.631       2. Swelling of right hand  M79.89       3. Joint stiffness of hand, right  M25.641             Start Time: 1145  Stop Time: 1230  Total time in clinic (min): 45 minutes  Subjective: Patient reports no functional changes at this time.       Objective: See treatment diary below      Auth Tracker- NO AUTH REQ BOMN  Auth Status Total   Visits  Expiration date Co-Insurance   pending                                  Visit Tracker  Date 2/27 2/28 3/5 3/7 3/12 3/14    3/18 3/21 3/25 3/28   4/1 4/2  RE    4/5 4/9 4/11 4/12 4/15 4/16    4/18 4/19 4/22 4/24 4/25 4/29      5/3 56  RE 5/8 5/10 5/13 5/15    5/22 5/23 5/24 5/28 5/29 6/3    6/ 6/10 6 7/3  RE                                                                 PMHx:   has a past medical history of Breast cyst, GERD (gastroesophageal reflux disease), and Macular degeneration.    Precautions: [place]    Manuals HEP 2024                        Ther Ex     Education on HEP and dx  x5min   Bicep curl's/hammer curls  X10 each 6#   Therapist assisted PROM wrist flex/ext & digit IP flexion/ext x x5min   Pronator  X10 #1    Measurements     Flexor stretch powerweb  Black 5 x 10 sec    WB on red ball  5 x 10 sec   Stretch on wedge with dumbbell  10 sec hold with 4#   Digiflex  X10 Blue        Ther Act      Towel scrunch   X10; full fist hold at end   Flex bar N's/U's  X10 green   Sponges grasp and release   Lvl 4 gripper   Red putty jarciser   X10 jarciser, x20 pinches, x5  squeezes                  Modalities     MHP  x5 min             Assessment:  Patient tolerated treatment well and able to perform TA and TE with good activity tolerance. Patient exhibited good technique  with therapeutic exercises/ activities. Patient would benefit from continued skilled OT to maximize ROM and strength at this time to improve functional use with ADLs.       Plan:   Focus on ROM and strengthening to improve ability to complete daily activites with ease.  POC 7/3/24 - 8/28/24

## 2024-08-19 ENCOUNTER — TRANSCRIBE ORDERS (OUTPATIENT)
Dept: LAB | Facility: CLINIC | Age: 74
End: 2024-08-19

## 2024-08-19 ENCOUNTER — OFFICE VISIT (OUTPATIENT)
Dept: OCCUPATIONAL THERAPY | Facility: CLINIC | Age: 74
End: 2024-08-19
Payer: MEDICARE

## 2024-08-19 ENCOUNTER — APPOINTMENT (OUTPATIENT)
Dept: LAB | Facility: CLINIC | Age: 74
End: 2024-08-19
Payer: MEDICARE

## 2024-08-19 DIAGNOSIS — M25.641 JOINT STIFFNESS OF HAND, RIGHT: ICD-10-CM

## 2024-08-19 DIAGNOSIS — M79.89 SWELLING OF RIGHT HAND: ICD-10-CM

## 2024-08-19 DIAGNOSIS — R49.0 DYSPHONIA OF ORGANIC TREMOR: ICD-10-CM

## 2024-08-19 DIAGNOSIS — R25.1 DYSPHONIA OF ORGANIC TREMOR: ICD-10-CM

## 2024-08-19 DIAGNOSIS — R49.0 DYSPHONIA OF ORGANIC TREMOR: Primary | ICD-10-CM

## 2024-08-19 DIAGNOSIS — M25.631 STIFFNESS OF RIGHT WRIST JOINT: Primary | ICD-10-CM

## 2024-08-19 DIAGNOSIS — R26.89 BALANCE PROBLEM: ICD-10-CM

## 2024-08-19 DIAGNOSIS — R25.1 DYSPHONIA OF ORGANIC TREMOR: Primary | ICD-10-CM

## 2024-08-19 PROCEDURE — 36415 COLL VENOUS BLD VENIPUNCTURE: CPT

## 2024-08-19 PROCEDURE — 84425 ASSAY OF VITAMIN B-1: CPT

## 2024-08-19 PROCEDURE — 97530 THERAPEUTIC ACTIVITIES: CPT

## 2024-08-19 PROCEDURE — 86618 LYME DISEASE ANTIBODY: CPT

## 2024-08-19 PROCEDURE — 97110 THERAPEUTIC EXERCISES: CPT

## 2024-08-19 NOTE — PROGRESS NOTES
S Daily Note    Today's date: 2024  Patient name: Dominique Varela  : 1950  MRN: [id number]  Referring provider: Karolyn Valdovinos PA-C  Dx:   Encounter Diagnosis     ICD-10-CM    1. Stiffness of right wrist joint  M25.631       2. Swelling of right hand  M79.89       3. Joint stiffness of hand, right  M25.641                  Subjective: Patient reports no functional changes at this time.       Objective: See treatment diary below      Auth Tracker- NO AUTH REQ BOMN  Auth Status Total   Visits  Expiration date Co-Insurance   pending                                  Visit Tracker  Date  3/5 3/7 3/12 3/14    3/18 3/21 3/25 3/28   4/1 4/2  RE    4/5 4/9 4/11 4/12 4/15 4/16    4/18 4/19 4/22 4/24 4/25 4/29      5/3 5/6  RE 5/8 5/10 5/13 5/15    5/22 5/23 5/24 5/28 5/29 6/3    6/ 6/10 6 7/3  RE  8                                                               PMHx:   has a past medical history of Breast cyst, GERD (gastroesophageal reflux disease), and Macular degeneration.    Precautions: [place]      Manuals HEP 2024                        Ther Ex     Education on HEP and dx  x5min   Bicep curl's/hammer curls  X10 each 6#   Therapist assisted PROM wrist flex/ext & digit IP flexion/ext x x5min   Pronator  X10 #1    Measurements     Flexor stretch powerweb  Black 5 x 10 sec    WB on red ball  5 x 10 sec   Stretch on wedge with dumbbell  10 sec hold with 4#   Digiflex  X10 Blue        Ther Act      Towel scrunch   X10; full fist hold at end   Flex bar N's/U's  X10 green   Sponges grasp and release   Lvl 4 gripper   Red putty jarciser   X10 jarciser, x20 pinches, x5  squeezes                  Modalities     MHP  x5 min             Assessment:  Patient tolerated treatment well and able to perform TA and TE with good activity tolerance. Patient exhibited good technique with therapeutic exercises/ activities. Patient would benefit from  continued skilled OT to maximize ROM and strength at this time to improve functional use with ADLs.       Plan:   Focus on ROM and strengthening to improve ability to complete daily activites with ease.  POC 7/3/24 - 8/28/24

## 2024-08-20 LAB — B BURGDOR IGG+IGM SER QL IA: NEGATIVE

## 2024-08-22 ENCOUNTER — HOSPITAL ENCOUNTER (OUTPATIENT)
Dept: RADIOLOGY | Facility: HOSPITAL | Age: 74
Discharge: HOME | End: 2024-08-22
Payer: MEDICARE

## 2024-08-22 VITALS — WEIGHT: 218 LBS | HEIGHT: 64 IN | BODY MASS INDEX: 37.22 KG/M2

## 2024-08-22 DIAGNOSIS — R92.8 ABNORMAL MAMMOGRAM: ICD-10-CM

## 2024-08-22 PROCEDURE — G0279 TOMOSYNTHESIS, MAMMO: HCPCS

## 2024-08-22 PROCEDURE — 76642 ULTRASOUND BREAST LIMITED: CPT

## 2024-08-22 PROCEDURE — 77065 DX MAMMO INCL CAD UNI: CPT

## 2024-08-22 NOTE — PROGRESS NOTES
Met with patient Dominique and radiologist Dr. Mtz regarding recommendation for:     _____ Right __x____ Left      __x__ Ultrasound guided breast biopsy    _____ Stereotactic breast biopsy.      __x___Verbalized understanding.      Blood thinners:  _____ yes ___x___ no    Date stopped: (not applicable)    Biopsy teaching sheet given:  __x___ yes _____ no    Tentative biopsy appointment: Monday 08/26/2024 @ 9am [facility] (check-in time @ 8:45am)    Our breast center is located at [address] [place] [address]. The breast center is located on the first floor/ main entry level of [facility]. Please check in @ the main entrance/ main lobby of the hospital for your outpatient procedure.    Patient is aware she does not need to fast for a breast biopsy procedure, no special soap/ surgical scrubs need to be used the night before or morning of her procedure, and no changes need to be made to her normal medication schedule. Wear a comfortable two piece outfit to your appointment, and wear/ or bring a bra with you to your appointment as this will help hold the ice pack in place that we would like you to use post-procedure.

## 2024-08-23 LAB — VIT B1 BLD-SCNC: 139.1 NMOL/L (ref 66.5–200)

## 2024-08-26 ENCOUNTER — APPOINTMENT (OUTPATIENT)
Dept: LAB | Facility: HOSPITAL | Age: 74
End: 2024-08-26
Payer: MEDICARE

## 2024-08-26 ENCOUNTER — APPOINTMENT (OUTPATIENT)
Dept: OCCUPATIONAL THERAPY | Facility: CLINIC | Age: 74
End: 2024-08-26
Payer: MEDICARE

## 2024-08-26 ENCOUNTER — HOSPITAL ENCOUNTER (OUTPATIENT)
Dept: RADIOLOGY | Facility: HOSPITAL | Age: 74
Discharge: HOME | End: 2024-08-26
Payer: MEDICARE

## 2024-08-26 VITALS — DIASTOLIC BLOOD PRESSURE: 72 MMHG | SYSTOLIC BLOOD PRESSURE: 140 MMHG

## 2024-08-26 DIAGNOSIS — R92.8 ABNORMAL MAMMOGRAM OF LEFT BREAST: ICD-10-CM

## 2024-08-26 PROCEDURE — 88342 IMHCHEM/IMCYTCHM 1ST ANTB: CPT | Performed by: PATHOLOGY

## 2024-08-26 PROCEDURE — A4648 IMPLANTABLE TISSUE MARKER: HCPCS

## 2024-08-26 PROCEDURE — 88305 TISSUE EXAM BY PATHOLOGIST: CPT | Performed by: PATHOLOGY

## 2024-08-26 PROCEDURE — 88341 IMHCHEM/IMCYTCHM EA ADD ANTB: CPT | Performed by: PATHOLOGY

## 2024-08-26 PROCEDURE — 19083 BX BREAST 1ST LESION US IMAG: CPT

## 2024-08-26 RX ORDER — LIDOCAINE HYDROCHLORIDE 10 MG/ML
5 INJECTION, SOLUTION EPIDURAL; INFILTRATION; INTRACAUDAL; PERINEURAL ONCE
Status: COMPLETED | OUTPATIENT
Start: 2024-08-26 | End: 2024-08-26

## 2024-08-26 RX ADMIN — LIDOCAINE HYDROCHLORIDE 5 ML: 10 INJECTION, SOLUTION EPIDURAL; INFILTRATION; INTRACAUDAL; PERINEURAL at 09:32

## 2024-08-26 NOTE — DISCHARGE INSTR - OTHER ORDERS
POST LARGE CORE BREAST BIOPSY PROCEDURE: PATIENT INFORMATION      Place an ice pack inside your bra over the top of the gauze dressing every hour for 20 minutes (20 minutes on, 60 minutes off). Do this until bedtime. The ice pack should be used whether pain is or is not present, as it significantly reduces the chance for bruising, bleeding, or hematoma development at home after your procedure. Please use as instructed.    Do not shower or bathe until the following morning Tuesday 08/27/2024 @ 10am.    You may shower/bathe your breast carefully with the steri-strips in place.  Be careful not to loosen them.  The steri-strips should remain in place 3-5 days to allow adequate time for your body to heal the breast biopsy incision site. If steri-strips have not fallen off on their own by Friday evening 08/30/2024, it would be appropriate to remove them.    You may have mild discomfort, and you may have some bruising where the needle entered the skin. Following a breast biopsy, it can be very common to have bruising around the biopsy site & in some cases the underside of the breast due to positioning during the procedure. This should clear within 1-2 weeks time post-procedure.    If you need medicine for discomfort, take acetaminophen products such as Tylenol. You may also take Advil or Motrin products. Avoid using any aspirin based products (avoid Aleve, Naproxen), as these medications can increase the risk for bleeding/ hematoma development at breast biopsy site.    Do not participate in strenuous activities such as-tennis, aerobics, skiing, weight lifting > 10 lbs, etc. for 24 hours. Refrain from swimming/soaking until biopsy incision is fully healed to reduce any risk for infection.    Wearing a bra for sleeping may be more comfortable for the first 24-48 hours.    Watch for continued bleeding, pain or fever over 101. If any of these symptoms occur, please contact our breast nurse navigator at the location where your  biopsy was performed.    During normal business hours (7:30 am-4:00 pm) please call the nurse navigator at the site where your   procedure was performed:    [facility] [place]:  [phone], [phone] or [phone]  [facility]:  Renee Hicks Radiology RN P# [phone]         or      Cecilia RAMOS P# [phone]              After 4 PM - please call your physician or go to the nearest Emergency Department location.          9.         The final results of your biopsy are usually available within one week.

## 2024-08-26 NOTE — PROGRESS NOTES
Procedure type:    __X___ultrasound guided _____stereotactic    Breast:    __X___Left _____Right    Location: 7:00 3 CM    Needle: 12G    # of passes: 3    Clip: Open coil    Performed by: Dr Mayo    Pressure held for 5 minutes by: Cecilia Shannon Strips:    ___x__yes _____no    Band aid:    _____yes___x__no    Tape and guaze:    ___x__yes _____no    Tolerated procedure:    ___x__yes _____no

## 2024-08-26 NOTE — PROGRESS NOTES
Ice pack given:    ___x__yes _____no    Discharge instructions reviewed & given to patient:    __x___yes _____no    Discharged via:    ___x__ambulatory    _____wheelchair    _____stretcher    Stable on discharge:    ___x__yes ____no

## 2024-08-27 ENCOUNTER — TELEPHONE (OUTPATIENT)
Dept: RADIOLOGY | Facility: HOSPITAL | Age: 74
End: 2024-08-27

## 2024-08-28 ENCOUNTER — TELEPHONE (OUTPATIENT)
Dept: RADIOLOGY | Facility: HOSPITAL | Age: 74
End: 2024-08-28

## 2024-08-28 PROCEDURE — 88341 IMHCHEM/IMCYTCHM EA ADD ANTB: CPT | Performed by: PATHOLOGY

## 2024-08-28 PROCEDURE — 88342 IMHCHEM/IMCYTCHM 1ST ANTB: CPT | Performed by: PATHOLOGY

## 2024-08-28 PROCEDURE — 88305 TISSUE EXAM BY PATHOLOGIST: CPT | Performed by: PATHOLOGY

## 2024-08-29 ENCOUNTER — APPOINTMENT (OUTPATIENT)
Dept: OCCUPATIONAL THERAPY | Facility: CLINIC | Age: 74
End: 2024-08-29
Payer: MEDICARE

## 2024-09-05 ENCOUNTER — OFFICE VISIT (OUTPATIENT)
Dept: OCCUPATIONAL THERAPY | Facility: CLINIC | Age: 74
End: 2024-09-05
Payer: MEDICARE

## 2024-09-05 DIAGNOSIS — M79.89 SWELLING OF RIGHT HAND: Primary | ICD-10-CM

## 2024-09-05 DIAGNOSIS — M25.641 JOINT STIFFNESS OF HAND, RIGHT: ICD-10-CM

## 2024-09-05 DIAGNOSIS — M25.631 STIFFNESS OF RIGHT WRIST JOINT: ICD-10-CM

## 2024-09-05 PROCEDURE — 97530 THERAPEUTIC ACTIVITIES: CPT

## 2024-09-05 PROCEDURE — 97110 THERAPEUTIC EXERCISES: CPT

## 2024-09-05 NOTE — PROGRESS NOTES
Re-Evaluation Note    Today's date: 2024  Patient name: Dominique Varela  : 1950  MRN: [id number]  Referring provider: Karolyn Valdovinos PA-C  Dx:   Encounter Diagnosis     ICD-10-CM    1. Swelling of right hand  M79.89       2. Joint stiffness of hand, right  M25.641       3. Stiffness of right wrist joint  M25.631                    Subjective: Patient reports continued stiffness in her middle finger, did receive a phone call from physician's office regarding having a pinches nerve in her neck.        Objective: See treatment diary below      Active Range of Motion   Left Wrist   Wrist flexion: 64 degrees   Wrist extension: 64 degrees     Right Wrist   Wrist flexion: 59 degrees   Wrist extension: 61 degrees     Left Thumb   Kapandji score: 10 degrees      Right Thumb   Kapandji score: 10 degrees      Strength/Myotome Testing     Left Wrist/Hand    (2nd hand position)     Trial 1: 65    Right Wrist/Hand    (2nd hand position)     Trial 1: 40       Goals  Short term goals 2-4 weeks  Establish HEP to enhance performance with ADLs.   MET    Improve active range of motion of RUE by 20  to assist with UE dressing. MET    Achieve composite digital flexion to touch distal montes crease for grasp on utensils. MET    In crease  strength by 10 lbs. to enhance gripping hand tools. MET        Updated STG:  Patient will increase  strength by at least 5-10 lbs to increase aid in picking up heavier objects during household chores. (Strength has stayed the same since previous RE, NOT MET)      Long Term goals by discharge  Establish final home exercise program to enhance maximal functional level with ADLs.    Achieve functional active range of motion of RUE for full return to household chores. MET      Achieve functional strength of RUE for full return to high level ADLs.        Auth Tracker- NO AUTH REQ BOMN  Auth Status Total   Visits  Expiration date Co-Insurance   pending                             "      Visit Tracker  Date 2/27 2/28 3/5 3/7 3/12 3/14    3/18 3/21 3/25 3/28   4/1 4/2  RE    4/5 4/9 4/11 4/12 4/15 4/16    4/18 4/19 4/22 4/24 4/25 4/29      5/3 5/6  RE 5/8 5/10 5/13 5/15    5/22 5/23 5/24 5/28 5/29 6/3    6/5 6/10 6/11 6/18 6/21 6/25 6/28 7/3  RE 7/9 7/11 7/16 7/18 8/1 8/5 8/14 8/19 9/5  RE                                                              PMHx:   has a past medical history of Breast cyst, GERD (gastroesophageal reflux disease), and Macular degeneration.    Precautions: [place]      Manuals HEP 9/5/2024                        Ther Ex     Education on HEP and dx  x5min   Bicep curl's/hammer curls  X10 each 6#   Therapist assisted PROM wrist flex/ext & digit IP flexion/ext x x5min   Pronator  X10 #1    Measurements  x   Flexor stretch powerweb  Black 5 x 10 sec    WB on red ball  5 x 10 sec   Stretch on wedge with dumbbell  10 sec hold with 4#   Digiflex  X10 Blue        Ther Act      Towel scrunch   X10; full fist hold at end   Flex bar N's/U's  X10 green   Sponges grasp and release   Lvl 4 gripper   Red putty jarciser   X10 jarciser, x20 pinches, x5  squeezes                  Modalities     MHP  x5 min             Assessment:  Patient re-evaluation done this this date with continued subjective report of \"discomfort/stiffness in the digits\" as well as noted deficits in RUE compared to LUE at this time when assessing  strength. Patient would benefit from continued skilled OT to maximize ROM and strength at this time to improve functional use with ADLs.  Patient tolerated treatment well and able to perform TA and TE with good activity tolerance. Patient exhibited good technique with therapeutic exercises/ activities.       Plan:   Focus on ROM and strengthening to improve ability to complete daily activites with ease.  POC 9/5/2024 -  11/7/2024       "

## 2024-10-09 ENCOUNTER — OFFICE VISIT (OUTPATIENT)
Age: 74
End: 2024-10-09
Payer: MEDICARE

## 2024-10-09 VITALS
HEART RATE: 69 BPM | HEIGHT: 64 IN | RESPIRATION RATE: 16 BRPM | BODY MASS INDEX: 37.22 KG/M2 | WEIGHT: 218 LBS | SYSTOLIC BLOOD PRESSURE: 120 MMHG | DIASTOLIC BLOOD PRESSURE: 77 MMHG

## 2024-10-09 DIAGNOSIS — M21.962 ACQUIRED DEFORMITY OF BOTH FEET: Primary | ICD-10-CM

## 2024-10-09 DIAGNOSIS — M20.42 HAMMER TOES OF BOTH FEET: ICD-10-CM

## 2024-10-09 DIAGNOSIS — M20.41 HAMMER TOES OF BOTH FEET: ICD-10-CM

## 2024-10-09 DIAGNOSIS — M79.672 PAIN IN BOTH FEET: ICD-10-CM

## 2024-10-09 DIAGNOSIS — M21.961 ACQUIRED DEFORMITY OF BOTH FEET: Primary | ICD-10-CM

## 2024-10-09 DIAGNOSIS — M79.671 PAIN IN BOTH FEET: ICD-10-CM

## 2024-10-09 PROCEDURE — 99202 OFFICE O/P NEW SF 15 MIN: CPT | Performed by: PODIATRIST

## 2024-10-09 RX ORDER — SOLIFENACIN SUCCINATE 5 MG/1
5 TABLET, FILM COATED ORAL 2 TIMES DAILY
COMMUNITY
Start: 2024-07-23

## 2024-10-09 RX ORDER — VALACYCLOVIR HYDROCHLORIDE 1 G/1
TABLET, FILM COATED ORAL
COMMUNITY
Start: 2024-07-29

## 2024-10-09 NOTE — PROGRESS NOTES
Assessment/Plan: Pain upon ambulation secondary to acquired deformity foot.  Bilateral hammertoe, second.  Pain.    Plan.  Chart reviewed.  PCP notes reviewed.  Patient examined.  At this time we recommend shoe gear style and size changes in order to accommodate hammertoes.  If no relief of symptoms patient will consider hammertoe surgery.  In addition patient advised of proper nail cutting technique.  Watch for signs of infection.  Return as needed.             Diagnoses and all orders for this visit:    Acquired deformity of both feet    Hammer toes of both feet    Pain in both feet    Other orders  -     solifenacin (VESICARE) 5 mg tablet; Take 5 mg by mouth 2 (two) times a day  -     valACYclovir (VALTREX) 1,000 mg tablet; TAKE 2 TABLETS BY MOUTH NOW, THEN TAKE 2 TABLETS BY MOUTH 12 HOURS LATER AS DIRECTED          Subjective: Patient has pain.  She has pain in her toes with ambulation and shoewear.  No history of trauma.    Allergies   Allergen Reactions    Atorvastatin Myalgia         Current Outpatient Medications:     Coenzyme Q10 (CoQ10) 50 MG CAPS, Take by mouth, Disp: , Rfl:     Lutein 40 MG CAPS, Take by mouth, Disp: , Rfl:     solifenacin (VESICARE) 5 mg tablet, Take 5 mg by mouth 2 (two) times a day, Disp: , Rfl:     valACYclovir (VALTREX) 1,000 mg tablet, TAKE 2 TABLETS BY MOUTH NOW, THEN TAKE 2 TABLETS BY MOUTH 12 HOURS LATER AS DIRECTED, Disp: , Rfl:     acetaminophen (TYLENOL) 500 mg tablet, Take 2 tablets (1,000 mg total) by mouth every 8 (eight) hours (Patient not taking: Reported on 7/1/2024), Disp: 60 tablet, Rfl: 0    capsicum (ZOSTRIX) 0.075 % topical cream, Apply topically 3 (three) times a day as needed (for nerve pain) (Patient not taking: Reported on 5/1/2024), Disp: 120 g, Rfl: 0    glucosamine-chondroitin 500-400 MG tablet, Take 1 tablet by mouth 3 (three) times a day (Patient not taking: Reported on 1/15/2024), Disp: , Rfl:     Multiple Vitamins-Minerals (MULTIVITAMIN WITH MINERALS)  tablet, Take 1 tablet by mouth daily, Disp: , Rfl:     ondansetron (ZOFRAN) 4 mg tablet, Take 1 tablet (4 mg total) by mouth every 8 (eight) hours as needed for nausea or vomiting (Patient not taking: Reported on 1/15/2024), Disp: 20 tablet, Rfl: 0    tolterodine (DETROL LA) 2 mg 24 hr capsule, Take 2 mg by mouth daily (Patient not taking: Reported on 5/1/2024), Disp: , Rfl:     Patient Active Problem List   Diagnosis    Nontraumatic complete tear of right rotator cuff    Hyperlipidemia    Carpal tunnel syndrome on both sides          Patient ID: Dominique Varela is a 74 y.o. female.    HPI    The following portions of the patient's history were reviewed and updated as appropriate:     family history includes Breast cancer in her other; Heart disease in her father; No Known Problems in her daughter, maternal aunt, maternal aunt, maternal grandmother, paternal aunt, paternal grandmother, and sister.      reports that she has never smoked. She has never been exposed to tobacco smoke. She has never used smokeless tobacco. She reports that she does not drink alcohol and does not use drugs.    Vitals:    10/09/24 1421   BP: 120/77   Pulse: 69   Resp: 16       Review of Systems      Objective:  Patient's shoes and socks removed.   Foot Exam    General  General Appearance: appears stated age and healthy   Orientation: alert and oriented to person, place, and time   Affect: appropriate   Gait: antalgic       Right Foot/Ankle     Inspection and Palpation  Tenderness: bony tenderness   Swelling: dorsum   Arch: pes planus  Hammertoes: second toe    Neurovascular  Dorsalis pedis: 2+  Posterior tibial: 2+      Left Foot/Ankle      Inspection and Palpation  Tenderness: bony tenderness   Swelling: dorsum   Arch: pes planus  Hammertoes: second toe    Neurovascular  Dorsalis pedis: 2+  Posterior tibial: 2+      Physical Exam  Vitals and nursing note reviewed.   Constitutional:       Appearance: Normal appearance.   Cardiovascular:       Rate and Rhythm: Normal rate and regular rhythm.      Pulses:           Dorsalis pedis pulses are 2+ on the right side and 2+ on the left side.        Posterior tibial pulses are 2+ on the right side and 2+ on the left side.   Musculoskeletal:      Right lower le+ Edema present.      Left lower le+ Edema present.      Right foot: Bony tenderness present.      Left foot: Bony tenderness present.   Skin:     Capillary Refill: Capillary refill takes less than 2 seconds.      Comments: All nails are dystrophic.  Second toe bilateral demonstrates wide incurvated toenail.   Neurological:      Mental Status: She is alert.   Psychiatric:         Mood and Affect: Mood normal.         Behavior: Behavior normal.         Thought Content: Thought content normal.         Judgment: Judgment normal.

## 2025-01-08 ENCOUNTER — OFFICE VISIT (OUTPATIENT)
Age: 75
End: 2025-01-08
Payer: MEDICARE

## 2025-01-08 VITALS — HEART RATE: 69 BPM | HEIGHT: 64 IN | BODY MASS INDEX: 37.22 KG/M2 | WEIGHT: 218 LBS | RESPIRATION RATE: 16 BRPM

## 2025-01-08 DIAGNOSIS — M21.961 ACQUIRED DEFORMITY OF BOTH FEET: Primary | ICD-10-CM

## 2025-01-08 DIAGNOSIS — M20.42 HAMMER TOES OF BOTH FEET: ICD-10-CM

## 2025-01-08 DIAGNOSIS — M79.672 PAIN IN BOTH FEET: ICD-10-CM

## 2025-01-08 DIAGNOSIS — M79.671 PAIN IN BOTH FEET: ICD-10-CM

## 2025-01-08 DIAGNOSIS — M20.41 HAMMER TOES OF BOTH FEET: ICD-10-CM

## 2025-01-08 DIAGNOSIS — M21.962 ACQUIRED DEFORMITY OF BOTH FEET: Primary | ICD-10-CM

## 2025-01-08 PROCEDURE — 99212 OFFICE O/P EST SF 10 MIN: CPT | Performed by: PODIATRIST

## 2025-01-08 NOTE — PROGRESS NOTES
Assessment/Plan: Pain upon ambulation secondary to acquired deformity foot.  Bilateral hammertoe, second.  Pain.     Plan.  Chart reviewed.  PCP notes reviewed.  Patient examined.  At this time we recommend shoe gear style and size changes in order to accommodate hammertoes.  If no relief of symptoms patient will consider hammertoe surgery.  In addition patient advised of proper nail cutting technique.  Watch for signs of infection.  Return as needed.                 Assessment  Diagnoses and all orders for this visit:     Acquired deformity of both feet     Hammer toes of both feet     Pain in both feet            Subjective: Patient has pain.  She has pain in her toes with ambulation and shoewear.  No history of trauma.          Allergies   Allergen Reactions    Atorvastatin Myalgia           Current Medications      Current Outpatient Medications:     Coenzyme Q10 (CoQ10) 50 MG CAPS, Take by mouth, Disp: , Rfl:     Lutein 40 MG CAPS, Take by mouth, Disp: , Rfl:     solifenacin (VESICARE) 5 mg tablet, Take 5 mg by mouth 2 (two) times a day, Disp: , Rfl:     valACYclovir (VALTREX) 1,000 mg tablet, TAKE 2 TABLETS BY MOUTH NOW, THEN TAKE 2 TABLETS BY MOUTH 12 HOURS LATER AS DIRECTED, Disp: , Rfl:     acetaminophen (TYLENOL) 500 mg tablet, Take 2 tablets (1,000 mg total) by mouth every 8 (eight) hours (Patient not taking: Reported on 7/1/2024), Disp: 60 tablet, Rfl: 0    capsicum (ZOSTRIX) 0.075 % topical cream, Apply topically 3 (three) times a day as needed (for nerve pain) (Patient not taking: Reported on 5/1/2024), Disp: 120 g, Rfl: 0    glucosamine-chondroitin 500-400 MG tablet, Take 1 tablet by mouth 3 (three) times a day (Patient not taking: Reported on 1/15/2024), Disp: , Rfl:     Multiple Vitamins-Minerals (MULTIVITAMIN WITH MINERALS) tablet, Take 1 tablet by mouth daily, Disp: , Rfl:     ondansetron (ZOFRAN) 4 mg tablet, Take 1 tablet (4 mg total) by mouth every 8 (eight) hours as needed for nausea or  vomiting (Patient not taking: Reported on 1/15/2024), Disp: 20 tablet, Rfl: 0    tolterodine (DETROL LA) 2 mg 24 hr capsule, Take 2 mg by mouth daily (Patient not taking: Reported on 5/1/2024), Disp: , Rfl:         Problem List       Patient Active Problem List   Diagnosis    Nontraumatic complete tear of right rotator cuff    Hyperlipidemia    Carpal tunnel syndrome on both sides               Subjective  Patient ID: Dominique Varela is a 74 y.o. female.     HPI     The following portions of the patient's history were reviewed and updated as appropriate:      family history includes Breast cancer in her other; Heart disease in her father; No Known Problems in her daughter, maternal aunt, maternal aunt, maternal grandmother, paternal aunt, paternal grandmother, and sister.       reports that she has never smoked. She has never been exposed to tobacco smoke. She has never used smokeless tobacco. She reports that she does not drink alcohol and does not use drugs.         Objective:  Patient's shoes and socks removed.  Objective  Foot Exam     General  General Appearance: appears stated age and healthy   Orientation: alert and oriented to person, place, and time   Affect: appropriate   Gait: antalgic         Right Foot/Ankle      Inspection and Palpation  Tenderness: bony tenderness   Swelling: dorsum   Arch: pes planus  Hammertoes: second toe     Neurovascular  Dorsalis pedis: 2+  Posterior tibial: 2+        Left Foot/Ankle       Inspection and Palpation  Tenderness: bony tenderness   Swelling: dorsum   Arch: pes planus  Hammertoes: second toe     Neurovascular  Dorsalis pedis: 2+  Posterior tibial: 2+        Physical Exam  Vitals and nursing note reviewed.   Constitutional:       Appearance: Normal appearance.   Cardiovascular:      Rate and Rhythm: Normal rate and regular rhythm.      Pulses:           Dorsalis pedis pulses are 2+ on the right side and 2+ on the left side.        Posterior tibial pulses are 2+ on the  right side and 2+ on the left side.   Musculoskeletal:      Right lower le+ Edema present.      Left lower le+ Edema present.      Right foot: Bony tenderness present.      Left foot: Bony tenderness present.   Skin:     Capillary Refill: Capillary refill takes less than 2 seconds.      Comments: All nails are dystrophic.  Second toe bilateral demonstrates wide incurvated toenail.   Neurological:      Mental Status: She is alert.   Psychiatric:         Mood and Affect: Mood normal.         Behavior: Behavior normal.         Thought Content: Thought content normal.         Judgment: Judgment normal.

## 2025-03-19 ENCOUNTER — OFFICE VISIT (OUTPATIENT)
Age: 75
End: 2025-03-19
Payer: MEDICARE

## 2025-03-19 VITALS — WEIGHT: 218 LBS | BODY MASS INDEX: 37.22 KG/M2 | HEIGHT: 64 IN | RESPIRATION RATE: 16 BRPM

## 2025-03-19 DIAGNOSIS — M79.671 PAIN IN BOTH FEET: ICD-10-CM

## 2025-03-19 DIAGNOSIS — M20.42 HAMMER TOES OF BOTH FEET: ICD-10-CM

## 2025-03-19 DIAGNOSIS — B35.9 DERMATOPHYTOSIS: ICD-10-CM

## 2025-03-19 DIAGNOSIS — M21.961 ACQUIRED DEFORMITY OF BOTH FEET: Primary | ICD-10-CM

## 2025-03-19 DIAGNOSIS — M21.962 ACQUIRED DEFORMITY OF BOTH FEET: Primary | ICD-10-CM

## 2025-03-19 DIAGNOSIS — M79.672 PAIN IN BOTH FEET: ICD-10-CM

## 2025-03-19 DIAGNOSIS — M20.41 HAMMER TOES OF BOTH FEET: ICD-10-CM

## 2025-03-19 PROCEDURE — 99213 OFFICE O/P EST LOW 20 MIN: CPT | Performed by: PODIATRIST

## 2025-03-19 NOTE — PROGRESS NOTES
Assessment/Plan: Pain upon ambulation secondary to acquired deformity foot.  Bilateral hammertoe, second.  Pain.  Dermatophytosis of heel with secondary xerosis.  Negative fissures.     Plan.  Chart reviewed.  PCP notes reviewed.  Patient examined.  At this time we recommend shoe gear style and size changes in order to accommodate hammertoes.  If no relief of symptoms patient will consider hammertoe surgery.  In addition patient advised of proper nail cutting technique.  Watch for signs of infection.  Return as needed.    Patient does not have prescription plan.  She will try over-the-counter Lotrimin cream.                 Assessment  Diagnoses and all orders for this visit:     Acquired deformity of both feet     Hammer toes of both feet     Pain in both feet            Subjective: Patient has pain.  She has pain in her toes with ambulation and shoewear.  No history of trauma.             Allergies   Allergen Reactions    Atorvastatin Myalgia            Current Medications      Current Outpatient Medications:     Coenzyme Q10 (CoQ10) 50 MG CAPS, Take by mouth, Disp: , Rfl:     Lutein 40 MG CAPS, Take by mouth, Disp: , Rfl:     solifenacin (VESICARE) 5 mg tablet, Take 5 mg by mouth 2 (two) times a day, Disp: , Rfl:     valACYclovir (VALTREX) 1,000 mg tablet, TAKE 2 TABLETS BY MOUTH NOW, THEN TAKE 2 TABLETS BY MOUTH 12 HOURS LATER AS DIRECTED, Disp: , Rfl:     acetaminophen (TYLENOL) 500 mg tablet, Take 2 tablets (1,000 mg total) by mouth every 8 (eight) hours (Patient not taking: Reported on 7/1/2024), Disp: 60 tablet, Rfl: 0    capsicum (ZOSTRIX) 0.075 % topical cream, Apply topically 3 (three) times a day as needed (for nerve pain) (Patient not taking: Reported on 5/1/2024), Disp: 120 g, Rfl: 0    glucosamine-chondroitin 500-400 MG tablet, Take 1 tablet by mouth 3 (three) times a day (Patient not taking: Reported on 1/15/2024), Disp: , Rfl:     Multiple Vitamins-Minerals (MULTIVITAMIN WITH MINERALS) tablet,  Take 1 tablet by mouth daily, Disp: , Rfl:     ondansetron (ZOFRAN) 4 mg tablet, Take 1 tablet (4 mg total) by mouth every 8 (eight) hours as needed for nausea or vomiting (Patient not taking: Reported on 1/15/2024), Disp: 20 tablet, Rfl: 0    tolterodine (DETROL LA) 2 mg 24 hr capsule, Take 2 mg by mouth daily (Patient not taking: Reported on 5/1/2024), Disp: , Rfl:          Problem List         Patient Active Problem List   Diagnosis    Nontraumatic complete tear of right rotator cuff    Hyperlipidemia    Carpal tunnel syndrome on both sides               Subjective  Patient ID: Dominique Varela is a 74 y.o. female.     HPI     The following portions of the patient's history were reviewed and updated as appropriate:      family history includes Breast cancer in her other; Heart disease in her father; No Known Problems in her daughter, maternal aunt, maternal aunt, maternal grandmother, paternal aunt, paternal grandmother, and sister.       reports that she has never smoked. She has never been exposed to tobacco smoke. She has never used smokeless tobacco. She reports that she does not drink alcohol and does not use drugs.         Objective:  Patient's shoes and socks removed.  Objective  Foot Exam     General  General Appearance: appears stated age and healthy   Orientation: alert and oriented to person, place, and time   Affect: appropriate   Gait: antalgic         Right Foot/Ankle      Inspection and Palpation  Tenderness: bony tenderness   Swelling: dorsum   Arch: pes planus  Hammertoes: second toe     Neurovascular  Dorsalis pedis: 2+  Posterior tibial: 2+        Left Foot/Ankle       Inspection and Palpation  Tenderness: bony tenderness   Swelling: dorsum   Arch: pes planus  Hammertoes: second toe     Neurovascular  Dorsalis pedis: 2+  Posterior tibial: 2+        Physical Exam  Vitals and nursing note reviewed.   Constitutional:       Appearance: Normal appearance.   Cardiovascular:      Rate and Rhythm: Normal  rate and regular rhythm.      Pulses:           Dorsalis pedis pulses are 2+ on the right side and 2+ on the left side.        Posterior tibial pulses are 2+ on the right side and 2+ on the left side.   Musculoskeletal:      Right lower le+ Edema present.      Left lower le+ Edema present.      Right foot: Bony tenderness present.      Left foot: Bony tenderness present.   Skin:     Capillary Refill: Capillary refill takes less than 2 seconds.      Comments: All nails are dystrophic.  Second toe bilateral demonstrates wide incurvated toenail.   Neurological:      Mental Status: She is alert.   Psychiatric:         Mood and Affect: Mood normal.         Behavior: Behavior normal.         Thought Content: Thought content normal.         Judgment: Judgment normal.

## 2025-05-21 ENCOUNTER — PROCEDURE VISIT (OUTPATIENT)
Age: 75
End: 2025-05-21
Payer: MEDICARE

## 2025-05-21 VITALS — RESPIRATION RATE: 16 BRPM | BODY MASS INDEX: 37.22 KG/M2 | HEIGHT: 64 IN | WEIGHT: 218 LBS

## 2025-05-21 DIAGNOSIS — M20.42 HAMMER TOES OF BOTH FEET: ICD-10-CM

## 2025-05-21 DIAGNOSIS — B35.9 DERMATOPHYTOSIS: ICD-10-CM

## 2025-05-21 DIAGNOSIS — M79.672 PAIN IN BOTH FEET: ICD-10-CM

## 2025-05-21 DIAGNOSIS — M21.961 ACQUIRED DEFORMITY OF BOTH FEET: Primary | ICD-10-CM

## 2025-05-21 DIAGNOSIS — M20.41 HAMMER TOES OF BOTH FEET: ICD-10-CM

## 2025-05-21 DIAGNOSIS — M21.962 ACQUIRED DEFORMITY OF BOTH FEET: Primary | ICD-10-CM

## 2025-05-21 DIAGNOSIS — M79.671 PAIN IN BOTH FEET: ICD-10-CM

## 2025-05-21 PROCEDURE — 99212 OFFICE O/P EST SF 10 MIN: CPT | Performed by: PODIATRIST

## 2025-05-21 NOTE — PROGRESS NOTES
Assessment/Plan: Pain upon ambulation secondary to acquired deformity foot.  Bilateral hammertoe, second.  Pain.  Dermatophytosis of heel with secondary xerosis.  Negative fissures.     Plan.  Chart reviewed.  PCP notes reviewed.  Patient examined.  At this time we recommend shoe gear style and size changes in order to accommodate hammertoes.  If no relief of symptoms patient will consider hammertoe surgery.  In addition patient advised of proper nail cutting technique.  Watch for signs of infection.  Return as needed.     Patient does not have prescription plan.  She will try over-the-counter Lotrimin cream.                 Assessment  Diagnoses and all orders for this visit:     Acquired deformity of both feet     Hammer toes of both feet     Pain in both feet            Subjective: Patient has pain.  She has pain in her toes with ambulation and shoewear.  No history of trauma.             Allergies   Allergen Reactions    Atorvastatin Myalgia            Current Medications      Current Outpatient Medications:     Coenzyme Q10 (CoQ10) 50 MG CAPS, Take by mouth, Disp: , Rfl:     Lutein 40 MG CAPS, Take by mouth, Disp: , Rfl:     solifenacin (VESICARE) 5 mg tablet, Take 5 mg by mouth 2 (two) times a day, Disp: , Rfl:     valACYclovir (VALTREX) 1,000 mg tablet, TAKE 2 TABLETS BY MOUTH NOW, THEN TAKE 2 TABLETS BY MOUTH 12 HOURS LATER AS DIRECTED, Disp: , Rfl:     acetaminophen (TYLENOL) 500 mg tablet, Take 2 tablets (1,000 mg total) by mouth every 8 (eight) hours (Patient not taking: Reported on 7/1/2024), Disp: 60 tablet, Rfl: 0    capsicum (ZOSTRIX) 0.075 % topical cream, Apply topically 3 (three) times a day as needed (for nerve pain) (Patient not taking: Reported on 5/1/2024), Disp: 120 g, Rfl: 0    glucosamine-chondroitin 500-400 MG tablet, Take 1 tablet by mouth 3 (three) times a day (Patient not taking: Reported on 1/15/2024), Disp: , Rfl:     Multiple Vitamins-Minerals (MULTIVITAMIN WITH MINERALS) tablet,  Take 1 tablet by mouth daily, Disp: , Rfl:     ondansetron (ZOFRAN) 4 mg tablet, Take 1 tablet (4 mg total) by mouth every 8 (eight) hours as needed for nausea or vomiting (Patient not taking: Reported on 1/15/2024), Disp: 20 tablet, Rfl: 0    tolterodine (DETROL LA) 2 mg 24 hr capsule, Take 2 mg by mouth daily (Patient not taking: Reported on 5/1/2024), Disp: , Rfl:          Problem List         Patient Active Problem List   Diagnosis    Nontraumatic complete tear of right rotator cuff    Hyperlipidemia    Carpal tunnel syndrome on both sides               Subjective  Patient ID: Dominique Varela is a 74 y.o. female.     HPI     The following portions of the patient's history were reviewed and updated as appropriate:      family history includes Breast cancer in her other; Heart disease in her father; No Known Problems in her daughter, maternal aunt, maternal aunt, maternal grandmother, paternal aunt, paternal grandmother, and sister.       reports that she has never smoked. She has never been exposed to tobacco smoke. She has never used smokeless tobacco. She reports that she does not drink alcohol and does not use drugs.         Objective:  Patient's shoes and socks removed.    Foot Exam     General  General Appearance: appears stated age and healthy   Orientation: alert and oriented to person, place, and time   Affect: appropriate   Gait: antalgic         Right Foot/Ankle      Inspection and Palpation  Tenderness: bony tenderness   Swelling: dorsum   Arch: pes planus  Hammertoes: second toe     Neurovascular  Dorsalis pedis: 2+  Posterior tibial: 2+        Left Foot/Ankle       Inspection and Palpation  Tenderness: bony tenderness   Swelling: dorsum   Arch: pes planus  Hammertoes: second toe     Neurovascular  Dorsalis pedis: 2+  Posterior tibial: 2+        Physical Exam  Vitals and nursing note reviewed.   Constitutional:       Appearance: Normal appearance.   Cardiovascular:      Rate and Rhythm: Normal rate and  regular rhythm.      Pulses:           Dorsalis pedis pulses are 2+ on the right side and 2+ on the left side.        Posterior tibial pulses are 2+ on the right side and 2+ on the left side.   Musculoskeletal:      Right lower le+ Edema present.      Left lower le+ Edema present.      Right foot: Bony tenderness present.      Left foot: Bony tenderness present.   Skin:     Capillary Refill: Capillary refill takes less than 2 seconds.      Comments: All nails are dystrophic.  Second toe bilateral demonstrates wide incurvated toenail.   Neurological:      Mental Status: She is alert.   Psychiatric:         Mood and Affect: Mood normal.         Behavior: Behavior normal.         Thought Content: Thought content normal.         Judgment: Judgment normal.

## 2025-07-24 ENCOUNTER — PROCEDURE VISIT (OUTPATIENT)
Age: 75
End: 2025-07-24
Payer: MEDICARE

## 2025-07-24 VITALS — RESPIRATION RATE: 17 BRPM | HEIGHT: 64 IN | BODY MASS INDEX: 37.22 KG/M2 | WEIGHT: 218 LBS

## 2025-07-24 DIAGNOSIS — M79.672 PAIN IN BOTH FEET: ICD-10-CM

## 2025-07-24 DIAGNOSIS — M21.961 ACQUIRED DEFORMITY OF BOTH FEET: Primary | ICD-10-CM

## 2025-07-24 DIAGNOSIS — B35.9 DERMATOPHYTOSIS: ICD-10-CM

## 2025-07-24 DIAGNOSIS — M21.962 ACQUIRED DEFORMITY OF BOTH FEET: Primary | ICD-10-CM

## 2025-07-24 DIAGNOSIS — M79.671 PAIN IN BOTH FEET: ICD-10-CM

## 2025-07-24 DIAGNOSIS — M20.41 HAMMER TOES OF BOTH FEET: ICD-10-CM

## 2025-07-24 DIAGNOSIS — M20.42 HAMMER TOES OF BOTH FEET: ICD-10-CM

## 2025-07-24 PROCEDURE — 99212 OFFICE O/P EST SF 10 MIN: CPT | Performed by: PODIATRIST

## 2025-07-24 NOTE — PATIENT INSTRUCTIONS
"Patient Education     Understanding body mass index (BMI)   The Basics   Written by the doctors and editors at [facility]   What is body mass index? -- Body mass index, or \"BMI,\" is a calculation doctors use to help understand a person's health. For adults, your weight and height are used to calculate your BMI (table 1).  Based on this number, you fall into 1 of these categories:   Underweight - BMI under 18.5   Healthy weight - BMI between 18.5 and 24.9   Overweight - BMI between 25 and 29.9   Having obesity - BMI 30 or greater  Your doctor or nurse will often calculate your BMI as part of a routine check-up. You can also do this yourself at home using a chart or online calculator.  How is a child's BMI calculated? -- BMI can be calculated for children using their height and weight. A child's BMI is then compared with a \"growth chart.\" Growth charts have information about the typical heights and weights of children who are the same age and sex. Doctors use BMI and the growth chart together to find out if a child is underweight, a healthy weight, overweight, or has obesity. What counts as being underweight or overweight for children is different than for adults.  If you have a question about a child's weight or BMI, talk to their doctor.  Should I see a doctor or nurse? -- If you have concerns about your weight, see your doctor or nurse. If you calculated your BMI at home, the doctor or nurse can help you understand what it means. They can also give you advice on how to maintain a healthy weight and lifestyle.  What does my BMI mean? -- Doctors use BMI to help understand the effect that your weight has on your health. In general, having obesity can increase the risk of having other health problems. These include:   Diabetes   High blood pressure   High cholesterol   Heart disease (including heart attacks)   Stroke   Sleep apnea (when you stop breathing for short periods of time while asleep)   Asthma   Cancer  You " "might also have trouble moving, breathing, or doing other things if you have obesity.  Being underweight can be bad for your health, too.  Remember that your weight and BMI are just pieces of your overall health. Someone with a lower BMI might not be healthy overall, and someone with a higher BMI can still be healthy. Other factors, like whether or not you smoke, also play a role.  No matter what your BMI is, try to live a healthy lifestyle. This includes not smoking, eating plenty of healthy foods like fruits and vegetables, and moving your body. Talk to your doctor about your overall health and what you can do to improve it.  What if I don't want to talk to my doctor about my BMI or weight? -- If you are not comfortable discussing your weight or BMI with your doctor or nurse, you can tell them so. You can ask them to focus on other parts of your health instead.  Remember that your doctor understands that managing weight can be difficult. If you are interested, they can work with you to develop a weight management plan.  All topics are updated as new evidence becomes available and our peer review process is complete.  This topic retrieved from [employer] on: Apr 03, 2024.  Topic 394927 Version 1.0  Release: 32.2.4 - C32.92  © 2024 [employer] and/or its affiliates. All rights reserved.  table 1: How to find your BMI     Healthy weight  Overweight  Obesity    BMI (kg/m2)  19 20 21 22 23 24 25 26 27 28 29 30 35 40   Height (inches)  Weight (pounds)    58\" 91 96 100 105 110 115 119 124 129 134 138 143 167 191   59\" 94 99 104 109 114 119 124 128 133 138 143 148 173 198   60\" 97 102 107 112 118 123 128 133 138 143 148 153 179 204   61\" 100 106 111 116 122 127 132 137 143 148 153 158 185 211   62\" 104 109 115 120 126 131 136 142 147 153 158 164 191 218   63\" 107 113 118 124 130 135 141 146 152 158 163 169 197 225   64\" 110 116 122 128 134 140 145 151 157 163 168 174 204 232   65\" 114 120 126 132 138 144 150 156 162 168 " "174 180 210 240   66\" 118 124 130 136 142 148 155 161 167 173 179 186 216 247   67\" 121 127 134 140 146 153 159 166 172 178 185 191 223 255   68\" 125 131 138 144 151 158 164 171 177 184 190 197 230 262   69\" 128 135 142 149 155 162 169 176 182 189 196 203 236 270   70\" 132 139 146 153 160 167 174 181 188 195 202 209 243 278   71\" 136 143 150 157 165 172 179 186 193 200 208 215 250 286   72\" 140 147 154 162 169 177 184 191 199 206 213 221 258 294   73\" 144 151 159 166 174 182 189 197 204 212 219 227 265 302   74\" 148 155 163 171 179 186 194 202 210 218 225 233 272 311   75\" 152 160 168 176 184 192 200 208 216 224 232 240 279 319   76\" 156 164 172 180 189 197 205 213 221 230 238 246 287 328   Body mass index, or \"BMI,\" is a measure of weight in relation to height. Doctors use this calculation to help understand a person's health risks. Based on the number, a person falls into 1 of 4 categories: underweight, healthy weight, overweight, or obesity.  To calculate your BMI, find your height (in inches) on the left. Then, look at that row to find your weight (in pounds). The number at the top of that column is your BMI.  Graphic 19296 Version 3.0  Consumer Information Use and Disclaimer   Disclaimer: This generalized information is a limited summary of diagnosis, treatment, and/or medication information. It is not meant to be comprehensive and should be used as a tool to help the user understand and/or assess potential diagnostic and treatment options. It does NOT include all information about conditions, treatments, medications, side effects, or risks that may apply to a specific patient. It is not intended to be medical advice or a substitute for the medical advice, diagnosis, or treatment of a health care provider based on the health care provider's examination and assessment of a patient's specific and unique circumstances. Patients must speak with a health care provider for complete information about their health, " medical questions, and treatment options, including any risks or benefits regarding use of medications. This information does not endorse any treatments or medications as safe, effective, or approved for treating a specific patient. Up[employer] Inc[employer] and its affiliates disclaim any warranty or liability relating to this information or the use thereof.The use of this information is governed by the Terms of Use, available at https://www."[employer].com/en/know/clinical-effectiveness-terms. 2024© Up[employer]Date[employer] Inc. and its affiliates and/or licensors. All rights reserved.  Copyright   © 2024 [employer] and/or its affiliates. All rights reserved.

## 2025-07-24 NOTE — PROGRESS NOTES
Assessment/Plan: Pain upon ambulation secondary to acquired deformity foot.  Bilateral hammertoe, second.  Pain.  Dermatophytosis of heel with secondary xerosis.  Negative fissures.     Plan.  Chart reviewed.  PCP notes reviewed.  Patient examined.  At this time we recommend shoe gear style and size changes in order to accommodate hammertoes.  If no relief of symptoms patient will consider hammertoe surgery.  In addition patient advised of proper nail cutting technique.  Watch for signs of infection.  Return as needed.     Patient will continue to use OTC Lotrisone cream as directed.                 Assessment  Diagnoses and all orders for this visit:     Acquired deformity of both feet     Hammer toes of both feet     Pain in both feet            Subjective: Patient has pain.  She has pain in her toes with ambulation and shoewear.  No history of trauma.             Allergies   Allergen Reactions    Atorvastatin Myalgia            Current Medications      Current Outpatient Medications:     Coenzyme Q10 (CoQ10) 50 MG CAPS, Take by mouth, Disp: , Rfl:     Lutein 40 MG CAPS, Take by mouth, Disp: , Rfl:     solifenacin (VESICARE) 5 mg tablet, Take 5 mg by mouth 2 (two) times a day, Disp: , Rfl:     valACYclovir (VALTREX) 1,000 mg tablet, TAKE 2 TABLETS BY MOUTH NOW, THEN TAKE 2 TABLETS BY MOUTH 12 HOURS LATER AS DIRECTED, Disp: , Rfl:     acetaminophen (TYLENOL) 500 mg tablet, Take 2 tablets (1,000 mg total) by mouth every 8 (eight) hours (Patient not taking: Reported on 7/1/2024), Disp: 60 tablet, Rfl: 0    capsicum (ZOSTRIX) 0.075 % topical cream, Apply topically 3 (three) times a day as needed (for nerve pain) (Patient not taking: Reported on 5/1/2024), Disp: 120 g, Rfl: 0    glucosamine-chondroitin 500-400 MG tablet, Take 1 tablet by mouth 3 (three) times a day (Patient not taking: Reported on 1/15/2024), Disp: , Rfl:     Multiple Vitamins-Minerals (MULTIVITAMIN WITH MINERALS) tablet, Take 1 tablet by mouth  daily, Disp: , Rfl:     ondansetron (ZOFRAN) 4 mg tablet, Take 1 tablet (4 mg total) by mouth every 8 (eight) hours as needed for nausea or vomiting (Patient not taking: Reported on 1/15/2024), Disp: 20 tablet, Rfl: 0    tolterodine (DETROL LA) 2 mg 24 hr capsule, Take 2 mg by mouth daily (Patient not taking: Reported on 5/1/2024), Disp: , Rfl:          Problem List         Patient Active Problem List   Diagnosis    Nontraumatic complete tear of right rotator cuff    Hyperlipidemia    Carpal tunnel syndrome on both sides               Subjective  Patient ID: Dominique Varela is a 75 y.o. female.     HPI     The following portions of the patient's history were reviewed and updated as appropriate:      family history includes Breast cancer in her other; Heart disease in her father; No Known Problems in her daughter, maternal aunt, maternal aunt, maternal grandmother, paternal aunt, paternal grandmother, and sister.       reports that she has never smoked. She has never been exposed to tobacco smoke. She has never used smokeless tobacco. She reports that she does not drink alcohol and does not use drugs.         Objective:  Patient's shoes and socks removed.     Foot Exam     General  General Appearance: appears stated age and healthy   Orientation: alert and oriented to person, place, and time   Affect: appropriate   Gait: antalgic         Right Foot/Ankle      Inspection and Palpation  Tenderness: bony tenderness   Swelling: dorsum   Arch: pes planus  Hammertoes: second toe     Neurovascular  Dorsalis pedis: 2+  Posterior tibial: 2+        Left Foot/Ankle       Inspection and Palpation  Tenderness: bony tenderness   Swelling: dorsum   Arch: pes planus  Hammertoes: second toe     Neurovascular  Dorsalis pedis: 2+  Posterior tibial: 2+        Physical Exam  Vitals and nursing note reviewed.   Constitutional:       Appearance: Normal appearance.   Cardiovascular:      Rate and Rhythm: Normal rate and regular rhythm.       Pulses:           Dorsalis pedis pulses are 2+ on the right side and 2+ on the left side.        Posterior tibial pulses are 2+ on the right side and 2+ on the left side.   Musculoskeletal:      Right lower le+ Edema present.      Left lower le+ Edema present.      Right foot: Bony tenderness present.      Left foot: Bony tenderness present.   Skin:     Capillary Refill: Capillary refill takes less than 2 seconds.      Comments: All nails are dystrophic.  Second toe bilateral demonstrates wide incurvated toenail.   Neurological:      Mental Status: She is alert.   Psychiatric:         Mood and Affect: Mood normal.         Behavior: Behavior normal.         Thought Content: Thought content normal.         Judgment: Judgment normal.

## (undated) DEVICE — NEPTUNE E-SEP SMOKE EVACUATION PENCIL, COATED, 70MM BLADE, PUSH BUTTON SWITCH: Brand: NEPTUNE E-SEP

## (undated) DEVICE — TIBURON BEACH CHAIR SHOULDER DRAPE: Brand: CONVERTORS

## (undated) DEVICE — SUTURETAPE 1.7MM W/CLOSED LOOP WHITE/BLUE AR-7538

## (undated) DEVICE — ASTOUND SURGICAL GOWN, XXX LARGE, X-LONG: Brand: CONVERTORS

## (undated) DEVICE — U-DRAPE: Brand: CONVERTORS

## (undated) DEVICE — 3M™ IOBAN™ 2 ANTIMICROBIAL INCISE DRAPE 6640EZ: Brand: IOBAN™ 2

## (undated) DEVICE — INTENDED FOR TISSUE SEPARATION, AND OTHER PROCEDURES THAT REQUIRE A SHARP SURGICAL BLADE TO PUNCTURE OR CUT.: Brand: BARD-PARKER ® CARBON RIB-BACK BLADES

## (undated) DEVICE — ABDOMINAL PAD: Brand: DERMACEA

## (undated) DEVICE — CANNULA 7 X70MM THRD SEAL SIDE PORT

## (undated) DEVICE — NEEDLE SUT SCORPION MEGALOADER

## (undated) DEVICE — DUAL SPIKE ADAPTER: Brand: CONMED

## (undated) DEVICE — DISPOSABLE EQUIPMENT COVER: Brand: LARGE TOWEL DRAPE

## (undated) DEVICE — CHLORAPREP HI-LITE 26ML ORANGE

## (undated) DEVICE — BLADE SHAVER TORPEDO 4MM 13CM  COOLCUT

## (undated) DEVICE — 3M™ MICROFOAM™ SURGICAL TAPE 4 ROLLS/CARTON 6 CARTONS/CASE 1528-3: Brand: 3M™ MICROFOAM™

## (undated) DEVICE — STRETCH BANDAGE: Brand: CURITY

## (undated) DEVICE — TUBING SUCTION 5MM X 12 FT

## (undated) DEVICE — POSITIONER TRIMANO LIMB BEACH CHAIR

## (undated) DEVICE — CURITY NON-ADHERENT STRIPS: Brand: CURITY

## (undated) DEVICE — PACK ARTHROSCOPY

## (undated) DEVICE — FOOT SWITCH DRAPE: Brand: UNBRANDED

## (undated) DEVICE — PROBE ABLATION  APOLLORF 90 DEG MULTI PORT

## (undated) DEVICE — CANNULA BUTTON 8 X 40MM PASSPORT

## (undated) DEVICE — GLOVE INDICATOR PI UNDERGLOVE SZ 6.5 BLUE

## (undated) DEVICE — POSITIONER HEAD DISP STRL

## (undated) DEVICE — 3M™ STERI-DRAPE™ U-DRAPE 1015: Brand: STERI-DRAPE™

## (undated) DEVICE — GLOVE SRG BIOGEL ECLIPSE 8

## (undated) DEVICE — GLOVE INDICATOR PI UNDERGLOVE SZ 8 BLUE

## (undated) DEVICE — TUBING ARTHROSCOPIC WAVE  MAIN PUMP

## (undated) DEVICE — GLOVE SRG BIOGEL 6.5